# Patient Record
Sex: FEMALE | Race: WHITE | Employment: PART TIME | URBAN - NONMETROPOLITAN AREA
[De-identification: names, ages, dates, MRNs, and addresses within clinical notes are randomized per-mention and may not be internally consistent; named-entity substitution may affect disease eponyms.]

---

## 2017-04-17 ENCOUNTER — HOSPITAL ENCOUNTER (EMERGENCY)
Age: 47
Discharge: HOME | End: 2017-04-17
Attending: EMERGENCY MEDICINE

## 2017-04-17 VITALS
TEMPERATURE: 97.8 F | WEIGHT: 167.55 LBS | RESPIRATION RATE: 14 BRPM | HEART RATE: 64 BPM | SYSTOLIC BLOOD PRESSURE: 125 MMHG | OXYGEN SATURATION: 100 % | BODY MASS INDEX: 25.48 KG/M2 | DIASTOLIC BLOOD PRESSURE: 69 MMHG

## 2017-04-17 DIAGNOSIS — R10.13 DYSPEPSIA: Primary | ICD-10-CM

## 2017-04-17 LAB
ABSOLUTE EOS #: 0 K/UL (ref 0–0.4)
ABSOLUTE LYMPH #: 2 K/UL (ref 1.1–2.7)
ABSOLUTE MONO #: 0.5 K/UL (ref 0–1)
ALBUMIN SERPL-MCNC: 4.4 G/DL (ref 3.5–5.2)
ALBUMIN/GLOBULIN RATIO: ABNORMAL (ref 1–2.5)
ALP BLD-CCNC: 66 U/L (ref 35–104)
ALT SERPL-CCNC: 10 U/L (ref 5–33)
ANION GAP SERPL CALCULATED.3IONS-SCNC: 14 MMOL/L (ref 9–17)
AST SERPL-CCNC: 17 U/L
BASOPHILS # BLD: 1 % (ref 0–2)
BASOPHILS ABSOLUTE: 0.1 K/UL (ref 0–0.2)
BILIRUB SERPL-MCNC: 0.48 MG/DL (ref 0.3–1.2)
BILIRUBIN URINE: NEGATIVE
BUN BLDV-MCNC: 13 MG/DL (ref 6–20)
BUN/CREAT BLD: 18 (ref 9–20)
CALCIUM SERPL-MCNC: 9.3 MG/DL (ref 8.6–10.4)
CHLORIDE BLD-SCNC: 97 MMOL/L (ref 98–107)
CO2: 27 MMOL/L (ref 20–31)
COLOR: YELLOW
COMMENT UA: NORMAL
CREAT SERPL-MCNC: 0.74 MG/DL (ref 0.5–0.9)
DIFFERENTIAL TYPE: YES
EOSINOPHILS RELATIVE PERCENT: 0 % (ref 0–5)
GFR AFRICAN AMERICAN: >60 ML/MIN
GFR NON-AFRICAN AMERICAN: >60 ML/MIN
GFR SERPL CREATININE-BSD FRML MDRD: ABNORMAL ML/MIN/{1.73_M2}
GFR SERPL CREATININE-BSD FRML MDRD: ABNORMAL ML/MIN/{1.73_M2}
GLUCOSE BLD-MCNC: 89 MG/DL (ref 70–99)
GLUCOSE URINE: NEGATIVE
HCT VFR BLD CALC: 38.7 % (ref 36–46)
HEMOGLOBIN: 12.8 G/DL (ref 12–16)
KETONES, URINE: NEGATIVE
LEUKOCYTE ESTERASE, URINE: NEGATIVE
LYMPHOCYTES # BLD: 26 % (ref 15–40)
MCH RBC QN AUTO: 28.3 PG (ref 26–34)
MCHC RBC AUTO-ENTMCNC: 33.1 G/DL (ref 31–37)
MCV RBC AUTO: 85.6 FL (ref 80–100)
MONOCYTES # BLD: 6 % (ref 4–8)
NITRITE, URINE: NEGATIVE
PDW BLD-RTO: 13.4 % (ref 12.1–15.2)
PH UA: 7 (ref 5–8)
PLATELET # BLD: 298 K/UL (ref 140–450)
PLATELET ESTIMATE: NORMAL
PMV BLD AUTO: NORMAL FL (ref 6–12)
POTASSIUM SERPL-SCNC: 3.9 MMOL/L (ref 3.7–5.3)
PROTEIN UA: NEGATIVE
RBC # BLD: 4.52 M/UL (ref 4–5.2)
RBC # BLD: NORMAL 10*6/UL
SEG NEUTROPHILS: 67 % (ref 47–75)
SEGMENTED NEUTROPHILS ABSOLUTE COUNT: 5.4 K/UL (ref 2.5–7)
SODIUM BLD-SCNC: 138 MMOL/L (ref 135–144)
SPECIFIC GRAVITY UA: 1.01 (ref 1–1.03)
TOTAL PROTEIN: 7.1 G/DL (ref 6.4–8.3)
TURBIDITY: CLEAR
URINE HGB: NEGATIVE
UROBILINOGEN, URINE: NORMAL
WBC # BLD: 8 K/UL (ref 3.5–11)
WBC # BLD: NORMAL 10*3/UL

## 2017-04-17 PROCEDURE — 85025 COMPLETE CBC W/AUTO DIFF WBC: CPT

## 2017-04-17 PROCEDURE — 81003 URINALYSIS AUTO W/O SCOPE: CPT

## 2017-04-17 PROCEDURE — 80053 COMPREHEN METABOLIC PANEL: CPT

## 2017-04-17 PROCEDURE — 36415 COLL VENOUS BLD VENIPUNCTURE: CPT

## 2017-04-17 PROCEDURE — 93005 ELECTROCARDIOGRAM TRACING: CPT

## 2017-04-17 PROCEDURE — 99284 EMERGENCY DEPT VISIT MOD MDM: CPT

## 2017-04-17 PROCEDURE — 6370000000 HC RX 637 (ALT 250 FOR IP): Performed by: EMERGENCY MEDICINE

## 2017-04-17 RX ORDER — OMEPRAZOLE 40 MG/1
40 CAPSULE, DELAYED RELEASE ORAL DAILY
Qty: 30 CAPSULE | Refills: 1 | Status: SHIPPED | OUTPATIENT
Start: 2017-04-17 | End: 2017-04-24 | Stop reason: HOSPADM

## 2017-04-17 RX ADMIN — Medication 30 ML: at 09:50

## 2017-04-17 ASSESSMENT — PAIN DESCRIPTION - LOCATION: LOCATION: ABDOMEN

## 2017-04-17 ASSESSMENT — PAIN SCALES - GENERAL
PAINLEVEL_OUTOF10: 2
PAINLEVEL_OUTOF10: 1
PAINLEVEL_OUTOF10: 7

## 2017-04-17 ASSESSMENT — PAIN DESCRIPTION - DESCRIPTORS: DESCRIPTORS: STABBING

## 2017-04-17 ASSESSMENT — PAIN DESCRIPTION - PAIN TYPE: TYPE: ACUTE PAIN

## 2017-04-17 ASSESSMENT — PAIN DESCRIPTION - FREQUENCY: FREQUENCY: INTERMITTENT

## 2017-04-24 ENCOUNTER — HOSPITAL ENCOUNTER (EMERGENCY)
Age: 47
Discharge: HOME | End: 2017-04-24
Attending: EMERGENCY MEDICINE

## 2017-04-24 ENCOUNTER — APPOINTMENT (OUTPATIENT)
Dept: GENERAL RADIOLOGY | Age: 47
End: 2017-04-24

## 2017-04-24 VITALS
HEART RATE: 78 BPM | DIASTOLIC BLOOD PRESSURE: 75 MMHG | BODY MASS INDEX: 25.09 KG/M2 | OXYGEN SATURATION: 99 % | RESPIRATION RATE: 18 BRPM | SYSTOLIC BLOOD PRESSURE: 129 MMHG | WEIGHT: 165 LBS | TEMPERATURE: 98.6 F

## 2017-04-24 DIAGNOSIS — K80.50 BILIARY COLIC: ICD-10-CM

## 2017-04-24 DIAGNOSIS — R10.9 ABDOMINAL PAIN, UNSPECIFIED LOCATION: Primary | ICD-10-CM

## 2017-04-24 LAB
ABSOLUTE EOS #: 0.1 K/UL (ref 0–0.4)
ABSOLUTE LYMPH #: 2.1 K/UL (ref 1.1–2.7)
ABSOLUTE MONO #: 0.7 K/UL (ref 0–1)
ANION GAP SERPL CALCULATED.3IONS-SCNC: 10 MMOL/L (ref 9–17)
BASOPHILS # BLD: 0 %
BASOPHILS ABSOLUTE: 0 K/UL (ref 0–0.2)
BILIRUBIN URINE: NEGATIVE
BUN BLDV-MCNC: 11 MG/DL (ref 6–20)
BUN/CREAT BLD: 15 (ref 9–20)
CALCIUM SERPL-MCNC: 9.3 MG/DL (ref 8.6–10.4)
CHLORIDE BLD-SCNC: 100 MMOL/L (ref 98–107)
CO2: 29 MMOL/L (ref 20–31)
COLOR: YELLOW
COMMENT UA: NORMAL
CREAT SERPL-MCNC: 0.71 MG/DL (ref 0.5–0.9)
DIFFERENTIAL TYPE: YES
EOSINOPHILS RELATIVE PERCENT: 1 %
GFR AFRICAN AMERICAN: >60 ML/MIN
GFR NON-AFRICAN AMERICAN: >60 ML/MIN
GFR SERPL CREATININE-BSD FRML MDRD: NORMAL ML/MIN/{1.73_M2}
GFR SERPL CREATININE-BSD FRML MDRD: NORMAL ML/MIN/{1.73_M2}
GLUCOSE BLD-MCNC: 95 MG/DL (ref 70–99)
GLUCOSE URINE: NEGATIVE
HCG(URINE) PREGNANCY TEST: NEGATIVE
HCT VFR BLD CALC: 37 % (ref 36–46)
HEMOGLOBIN: 12.3 G/DL (ref 12–16)
KETONES, URINE: NEGATIVE
LEUKOCYTE ESTERASE, URINE: NEGATIVE
LIPASE: 40 U/L (ref 13–60)
LYMPHOCYTES # BLD: 30 %
MCH RBC QN AUTO: 28.5 PG (ref 26–34)
MCHC RBC AUTO-ENTMCNC: 33.2 G/DL (ref 31–37)
MCV RBC AUTO: 85.9 FL (ref 80–100)
MONOCYTES # BLD: 10 %
NITRITE, URINE: NEGATIVE
PDW BLD-RTO: 13.3 % (ref 12.1–15.2)
PH UA: 6.5 (ref 5–8)
PLATELET # BLD: 316 K/UL (ref 140–450)
PLATELET ESTIMATE: NORMAL
PMV BLD AUTO: NORMAL FL (ref 6–12)
POTASSIUM SERPL-SCNC: 4.1 MMOL/L (ref 3.7–5.3)
PROTEIN UA: NEGATIVE
RBC # BLD: 4.3 M/UL (ref 4–5.2)
RBC # BLD: NORMAL 10*6/UL
SEG NEUTROPHILS: 59 %
SEGMENTED NEUTROPHILS ABSOLUTE COUNT: 4.2 K/UL (ref 2.5–7)
SODIUM BLD-SCNC: 139 MMOL/L (ref 135–144)
SPECIFIC GRAVITY UA: 1.01 (ref 1–1.03)
TURBIDITY: CLEAR
URINE HGB: NEGATIVE
UROBILINOGEN, URINE: NORMAL
WBC # BLD: 7.1 K/UL (ref 3.5–11)
WBC # BLD: NORMAL 10*3/UL

## 2017-04-24 PROCEDURE — 99284 EMERGENCY DEPT VISIT MOD MDM: CPT

## 2017-04-24 PROCEDURE — 81003 URINALYSIS AUTO W/O SCOPE: CPT

## 2017-04-24 PROCEDURE — 84703 CHORIONIC GONADOTROPIN ASSAY: CPT

## 2017-04-24 PROCEDURE — 74022 RADEX COMPL AQT ABD SERIES: CPT

## 2017-04-24 PROCEDURE — 85025 COMPLETE CBC W/AUTO DIFF WBC: CPT

## 2017-04-24 PROCEDURE — 80048 BASIC METABOLIC PNL TOTAL CA: CPT

## 2017-04-24 PROCEDURE — 36415 COLL VENOUS BLD VENIPUNCTURE: CPT

## 2017-04-24 PROCEDURE — 2580000003 HC RX 258: Performed by: EMERGENCY MEDICINE

## 2017-04-24 PROCEDURE — 83690 ASSAY OF LIPASE: CPT

## 2017-04-24 RX ORDER — PANTOPRAZOLE SODIUM 40 MG/1
40 TABLET, DELAYED RELEASE ORAL DAILY
Qty: 10 TABLET | Refills: 0 | Status: SHIPPED | OUTPATIENT
Start: 2017-04-24 | End: 2017-05-10 | Stop reason: SDUPTHER

## 2017-04-24 RX ADMIN — SODIUM CHLORIDE 1000 ML: 9 INJECTION, SOLUTION INTRAVENOUS at 06:55

## 2017-04-24 ASSESSMENT — ENCOUNTER SYMPTOMS
ALLERGIC/IMMUNOLOGIC NEGATIVE: 1
NAUSEA: 0
SORE THROAT: 0
HEMATOCHEZIA: 0
RECTAL PAIN: 0
RESPIRATORY NEGATIVE: 1
SHORTNESS OF BREATH: 0
BLOOD IN STOOL: 0
CONSTIPATION: 0
ABDOMINAL PAIN: 1
BELCHING: 0
VOMITING: 0
ABDOMINAL DISTENTION: 0
ANAL BLEEDING: 0
FLATUS: 0
COUGH: 0
HEMATEMESIS: 0
EYES NEGATIVE: 1
DIARRHEA: 0

## 2017-04-24 ASSESSMENT — PAIN SCALES - GENERAL: PAINLEVEL_OUTOF10: 10

## 2017-04-25 ENCOUNTER — INITIAL CONSULT (OUTPATIENT)
Dept: SURGERY | Age: 47
End: 2017-04-25

## 2017-04-25 VITALS
RESPIRATION RATE: 16 BRPM | HEART RATE: 81 BPM | HEIGHT: 68 IN | SYSTOLIC BLOOD PRESSURE: 134 MMHG | WEIGHT: 166 LBS | BODY MASS INDEX: 25.16 KG/M2 | TEMPERATURE: 97.5 F | DIASTOLIC BLOOD PRESSURE: 67 MMHG

## 2017-04-25 DIAGNOSIS — K80.20 SYMPTOMATIC CHOLELITHIASIS: Primary | ICD-10-CM

## 2017-04-25 PROCEDURE — 99204 OFFICE O/P NEW MOD 45 MIN: CPT | Performed by: SURGERY

## 2017-04-28 ENCOUNTER — HOSPITAL ENCOUNTER (OUTPATIENT)
Dept: ULTRASOUND IMAGING | Age: 47
Discharge: HOME | End: 2017-04-28

## 2017-04-28 ENCOUNTER — ANESTHESIA EVENT (OUTPATIENT)
Dept: OPERATING ROOM | Age: 47
End: 2017-04-28

## 2017-04-28 DIAGNOSIS — K80.20 SYMPTOMATIC CHOLELITHIASIS: ICD-10-CM

## 2017-04-28 PROCEDURE — 76705 ECHO EXAM OF ABDOMEN: CPT

## 2017-05-01 ENCOUNTER — APPOINTMENT (OUTPATIENT)
Dept: GENERAL RADIOLOGY | Age: 47
End: 2017-05-01
Attending: SURGERY

## 2017-05-01 ENCOUNTER — HOSPITAL ENCOUNTER (OUTPATIENT)
Age: 47
Setting detail: OUTPATIENT SURGERY
Discharge: HOME | End: 2017-05-01
Attending: SURGERY | Admitting: SURGERY

## 2017-05-01 ENCOUNTER — ANESTHESIA (OUTPATIENT)
Dept: OPERATING ROOM | Age: 47
End: 2017-05-01

## 2017-05-01 VITALS
SYSTOLIC BLOOD PRESSURE: 131 MMHG | TEMPERATURE: 98.6 F | RESPIRATION RATE: 13 BRPM | DIASTOLIC BLOOD PRESSURE: 73 MMHG | OXYGEN SATURATION: 100 %

## 2017-05-01 VITALS
WEIGHT: 163 LBS | BODY MASS INDEX: 25.58 KG/M2 | DIASTOLIC BLOOD PRESSURE: 50 MMHG | TEMPERATURE: 97 F | SYSTOLIC BLOOD PRESSURE: 114 MMHG | HEIGHT: 67 IN | OXYGEN SATURATION: 99 % | RESPIRATION RATE: 16 BRPM | HEART RATE: 56 BPM

## 2017-05-01 PROBLEM — K80.20 SYMPTOMATIC CHOLELITHIASIS: Status: ACTIVE | Noted: 2017-05-01

## 2017-05-01 LAB — HCG(URINE) PREGNANCY TEST: NEGATIVE

## 2017-05-01 PROCEDURE — 3600000003 HC SURGERY LEVEL 3 BASE: Performed by: SURGERY

## 2017-05-01 PROCEDURE — 6360000002 HC RX W HCPCS: Performed by: SURGERY

## 2017-05-01 PROCEDURE — 6360000002 HC RX W HCPCS: Performed by: NURSE ANESTHETIST, CERTIFIED REGISTERED

## 2017-05-01 PROCEDURE — 3700000000 HC ANESTHESIA ATTENDED CARE: Performed by: SURGERY

## 2017-05-01 PROCEDURE — 7100000010 HC PHASE II RECOVERY - FIRST 15 MIN: Performed by: SURGERY

## 2017-05-01 PROCEDURE — 3600000013 HC SURGERY LEVEL 3 ADDTL 15MIN: Performed by: SURGERY

## 2017-05-01 PROCEDURE — 88304 TISSUE EXAM BY PATHOLOGIST: CPT

## 2017-05-01 PROCEDURE — 84703 CHORIONIC GONADOTROPIN ASSAY: CPT

## 2017-05-01 PROCEDURE — 7100000000 HC PACU RECOVERY - FIRST 15 MIN: Performed by: SURGERY

## 2017-05-01 PROCEDURE — 7100000011 HC PHASE II RECOVERY - ADDTL 15 MIN: Performed by: SURGERY

## 2017-05-01 PROCEDURE — 7100000001 HC PACU RECOVERY - ADDTL 15 MIN: Performed by: SURGERY

## 2017-05-01 PROCEDURE — 2580000003 HC RX 258: Performed by: SURGERY

## 2017-05-01 PROCEDURE — 2500000003 HC RX 250 WO HCPCS: Performed by: NURSE ANESTHETIST, CERTIFIED REGISTERED

## 2017-05-01 PROCEDURE — 3700000001 HC ADD 15 MINUTES (ANESTHESIA): Performed by: SURGERY

## 2017-05-01 PROCEDURE — 2500000003 HC RX 250 WO HCPCS: Performed by: SURGERY

## 2017-05-01 PROCEDURE — 2500000003 HC RX 250 WO HCPCS

## 2017-05-01 RX ORDER — SUCCINYLCHOLINE CHLORIDE 20 MG/ML
INJECTION INTRAMUSCULAR; INTRAVENOUS PRN
Status: DISCONTINUED | OUTPATIENT
Start: 2017-05-01 | End: 2017-05-01 | Stop reason: SDUPTHER

## 2017-05-01 RX ORDER — FENTANYL CITRATE 50 UG/ML
INJECTION, SOLUTION INTRAMUSCULAR; INTRAVENOUS PRN
Status: DISCONTINUED | OUTPATIENT
Start: 2017-05-01 | End: 2017-05-01 | Stop reason: SDUPTHER

## 2017-05-01 RX ORDER — DEXAMETHASONE SODIUM PHOSPHATE 10 MG/ML
INJECTION INTRAMUSCULAR; INTRAVENOUS PRN
Status: DISCONTINUED | OUTPATIENT
Start: 2017-05-01 | End: 2017-05-01 | Stop reason: SDUPTHER

## 2017-05-01 RX ORDER — KETOROLAC TROMETHAMINE 30 MG/ML
INJECTION, SOLUTION INTRAMUSCULAR; INTRAVENOUS PRN
Status: DISCONTINUED | OUTPATIENT
Start: 2017-05-01 | End: 2017-05-01 | Stop reason: SDUPTHER

## 2017-05-01 RX ORDER — MIDAZOLAM HYDROCHLORIDE 1 MG/ML
INJECTION INTRAMUSCULAR; INTRAVENOUS PRN
Status: DISCONTINUED | OUTPATIENT
Start: 2017-05-01 | End: 2017-05-01 | Stop reason: SDUPTHER

## 2017-05-01 RX ORDER — BUPIVACAINE HYDROCHLORIDE AND EPINEPHRINE 2.5; 5 MG/ML; UG/ML
INJECTION, SOLUTION EPIDURAL; INFILTRATION; INTRACAUDAL; PERINEURAL PRN
Status: DISCONTINUED | OUTPATIENT
Start: 2017-05-01 | End: 2017-05-01 | Stop reason: HOSPADM

## 2017-05-01 RX ORDER — SODIUM CHLORIDE, SODIUM LACTATE, POTASSIUM CHLORIDE, CALCIUM CHLORIDE 600; 310; 30; 20 MG/100ML; MG/100ML; MG/100ML; MG/100ML
INJECTION, SOLUTION INTRAVENOUS CONTINUOUS
Status: DISCONTINUED | OUTPATIENT
Start: 2017-05-01 | End: 2017-05-01 | Stop reason: HOSPADM

## 2017-05-01 RX ORDER — SODIUM CHLORIDE 0.9 % (FLUSH) 0.9 %
10 SYRINGE (ML) INJECTION PRN
Status: DISCONTINUED | OUTPATIENT
Start: 2017-05-01 | End: 2017-05-01 | Stop reason: HOSPADM

## 2017-05-01 RX ORDER — MORPHINE SULFATE 4 MG/ML
1 INJECTION, SOLUTION INTRAMUSCULAR; INTRAVENOUS
Status: DISCONTINUED | OUTPATIENT
Start: 2017-05-01 | End: 2017-05-01 | Stop reason: HOSPADM

## 2017-05-01 RX ORDER — ONDANSETRON 2 MG/ML
INJECTION INTRAMUSCULAR; INTRAVENOUS PRN
Status: DISCONTINUED | OUTPATIENT
Start: 2017-05-01 | End: 2017-05-01 | Stop reason: SDUPTHER

## 2017-05-01 RX ORDER — ROCURONIUM BROMIDE 10 MG/ML
INJECTION, SOLUTION INTRAVENOUS PRN
Status: DISCONTINUED | OUTPATIENT
Start: 2017-05-01 | End: 2017-05-01 | Stop reason: SDUPTHER

## 2017-05-01 RX ORDER — LIDOCAINE HYDROCHLORIDE 10 MG/ML
INJECTION, SOLUTION EPIDURAL; INFILTRATION; INTRACAUDAL; PERINEURAL PRN
Status: DISCONTINUED | OUTPATIENT
Start: 2017-05-01 | End: 2017-05-01 | Stop reason: HOSPADM

## 2017-05-01 RX ORDER — PROPOFOL 10 MG/ML
INJECTION, EMULSION INTRAVENOUS PRN
Status: DISCONTINUED | OUTPATIENT
Start: 2017-05-01 | End: 2017-05-01 | Stop reason: SDUPTHER

## 2017-05-01 RX ORDER — ACETAMINOPHEN 10 MG/ML
INJECTION, SOLUTION INTRAVENOUS PRN
Status: DISCONTINUED | OUTPATIENT
Start: 2017-05-01 | End: 2017-05-01 | Stop reason: SDUPTHER

## 2017-05-01 RX ORDER — ACETAMINOPHEN 325 MG/1
650 TABLET ORAL EVERY 4 HOURS PRN
Status: DISCONTINUED | OUTPATIENT
Start: 2017-05-01 | End: 2017-05-01 | Stop reason: HOSPADM

## 2017-05-01 RX ORDER — SODIUM CHLORIDE 0.9 % (FLUSH) 0.9 %
10 SYRINGE (ML) INJECTION EVERY 12 HOURS SCHEDULED
Status: DISCONTINUED | OUTPATIENT
Start: 2017-05-01 | End: 2017-05-01 | Stop reason: HOSPADM

## 2017-05-01 RX ORDER — HYDROCODONE BITARTRATE AND ACETAMINOPHEN 5; 325 MG/1; MG/1
TABLET ORAL
Qty: 30 TABLET | Refills: 0 | Status: SHIPPED | OUTPATIENT
Start: 2017-05-01 | End: 2017-05-10

## 2017-05-01 RX ORDER — ONDANSETRON 2 MG/ML
4 INJECTION INTRAMUSCULAR; INTRAVENOUS EVERY 6 HOURS PRN
Status: DISCONTINUED | OUTPATIENT
Start: 2017-05-01 | End: 2017-05-01 | Stop reason: HOSPADM

## 2017-05-01 RX ORDER — LIDOCAINE HYDROCHLORIDE 10 MG/ML
INJECTION, SOLUTION EPIDURAL; INFILTRATION; INTRACAUDAL; PERINEURAL PRN
Status: DISCONTINUED | OUTPATIENT
Start: 2017-05-01 | End: 2017-05-01 | Stop reason: SDUPTHER

## 2017-05-01 RX ADMIN — ACETAMINOPHEN 1000 MG: 10 INJECTION, SOLUTION INTRAVENOUS at 11:29

## 2017-05-01 RX ADMIN — SODIUM CHLORIDE, POTASSIUM CHLORIDE, SODIUM LACTATE AND CALCIUM CHLORIDE: 600; 310; 30; 20 INJECTION, SOLUTION INTRAVENOUS at 10:37

## 2017-05-01 RX ADMIN — MIDAZOLAM 2 MG: 1 INJECTION INTRAMUSCULAR; INTRAVENOUS at 11:16

## 2017-05-01 RX ADMIN — ROCURONIUM BROMIDE 30 MG: 10 INJECTION, SOLUTION INTRAVENOUS at 11:29

## 2017-05-01 RX ADMIN — CEFAZOLIN SODIUM 2 G: 2 SOLUTION INTRAVENOUS at 11:15

## 2017-05-01 RX ADMIN — FENTANYL CITRATE 100 MCG: 50 INJECTION, SOLUTION INTRAMUSCULAR; INTRAVENOUS at 11:16

## 2017-05-01 RX ADMIN — SUCCINYLCHOLINE CHLORIDE 100 MG: 20 INJECTION, SOLUTION INTRAMUSCULAR; INTRAVENOUS at 11:21

## 2017-05-01 RX ADMIN — SUGAMMADEX 160 MG: 100 INJECTION, SOLUTION INTRAVENOUS at 12:10

## 2017-05-01 RX ADMIN — LIDOCAINE HYDROCHLORIDE 40 MG: 10 INJECTION, SOLUTION EPIDURAL; INFILTRATION; INTRACAUDAL; PERINEURAL at 11:21

## 2017-05-01 RX ADMIN — DEXAMETHASONE SODIUM PHOSPHATE 10 MG: 10 INJECTION INTRAMUSCULAR; INTRAVENOUS at 11:21

## 2017-05-01 RX ADMIN — HYDROMORPHONE HYDROCHLORIDE 0.5 MG: 1 INJECTION, SOLUTION INTRAMUSCULAR; INTRAVENOUS; SUBCUTANEOUS at 12:53

## 2017-05-01 RX ADMIN — KETOROLAC TROMETHAMINE 30 MG: 30 INJECTION, SOLUTION INTRAMUSCULAR at 11:21

## 2017-05-01 RX ADMIN — HYDROMORPHONE HYDROCHLORIDE 0.5 MG: 1 INJECTION, SOLUTION INTRAMUSCULAR; INTRAVENOUS; SUBCUTANEOUS at 13:42

## 2017-05-01 RX ADMIN — PROPOFOL 200 MG: 10 INJECTION, EMULSION INTRAVENOUS at 11:21

## 2017-05-01 RX ADMIN — ONDANSETRON 4 MG: 2 INJECTION, SOLUTION INTRAMUSCULAR; INTRAVENOUS at 11:21

## 2017-05-01 ASSESSMENT — PAIN SCALES - GENERAL
PAINLEVEL_OUTOF10: 5
PAINLEVEL_OUTOF10: 3
PAINLEVEL_OUTOF10: 0
PAINLEVEL_OUTOF10: 6
PAINLEVEL_OUTOF10: 3
PAINLEVEL_OUTOF10: 6
PAINLEVEL_OUTOF10: 10

## 2017-05-01 ASSESSMENT — PAIN - FUNCTIONAL ASSESSMENT: PAIN_FUNCTIONAL_ASSESSMENT: 0-10

## 2017-05-03 LAB — SURGICAL PATHOLOGY REPORT: NORMAL

## 2017-05-10 ENCOUNTER — OFFICE VISIT (OUTPATIENT)
Dept: SURGERY | Age: 47
End: 2017-05-10

## 2017-05-10 VITALS — BODY MASS INDEX: 25.43 KG/M2 | WEIGHT: 162 LBS | HEIGHT: 67 IN | TEMPERATURE: 98.1 F

## 2017-05-10 DIAGNOSIS — Z90.49 S/P LAPAROSCOPIC CHOLECYSTECTOMY: Primary | ICD-10-CM

## 2017-05-10 LAB
EKG ATRIAL RATE: 90 BPM
EKG P AXIS: 71 DEGREES
EKG P-R INTERVAL: 152 MS
EKG Q-T INTERVAL: 378 MS
EKG QRS DURATION: 88 MS
EKG QTC CALCULATION (BAZETT): 462 MS
EKG R AXIS: 77 DEGREES
EKG T AXIS: 60 DEGREES
EKG VENTRICULAR RATE: 90 BPM

## 2017-05-10 PROCEDURE — 99024 POSTOP FOLLOW-UP VISIT: CPT | Performed by: SURGERY

## 2017-05-10 RX ORDER — PANTOPRAZOLE SODIUM 40 MG/1
40 GRANULE, DELAYED RELEASE ORAL
COMMUNITY
End: 2018-04-04 | Stop reason: ALTCHOICE

## 2017-10-25 ENCOUNTER — OFFICE VISIT (OUTPATIENT)
Dept: FAMILY MEDICINE CLINIC | Age: 47
End: 2017-10-25

## 2017-10-25 VITALS
WEIGHT: 172 LBS | SYSTOLIC BLOOD PRESSURE: 130 MMHG | DIASTOLIC BLOOD PRESSURE: 80 MMHG | HEIGHT: 67 IN | BODY MASS INDEX: 27 KG/M2

## 2017-10-25 DIAGNOSIS — N39.0 ACUTE UTI: Primary | ICD-10-CM

## 2017-10-25 DIAGNOSIS — F33.1 MODERATE EPISODE OF RECURRENT MAJOR DEPRESSIVE DISORDER (HCC): ICD-10-CM

## 2017-10-25 PROCEDURE — 99213 OFFICE O/P EST LOW 20 MIN: CPT | Performed by: FAMILY MEDICINE

## 2017-10-25 RX ORDER — SULFAMETHOXAZOLE AND TRIMETHOPRIM 800; 160 MG/1; MG/1
1 TABLET ORAL 2 TIMES DAILY
Qty: 14 TABLET | Refills: 0 | Status: SHIPPED | OUTPATIENT
Start: 2017-10-25 | End: 2017-11-01

## 2017-10-25 RX ORDER — CITALOPRAM 20 MG/1
20 TABLET ORAL DAILY
Qty: 30 TABLET | Refills: 5 | Status: SHIPPED | OUTPATIENT
Start: 2017-10-25 | End: 2018-04-04 | Stop reason: SDUPTHER

## 2017-10-25 ASSESSMENT — PATIENT HEALTH QUESTIONNAIRE - PHQ9
1. LITTLE INTEREST OR PLEASURE IN DOING THINGS: 0
SUM OF ALL RESPONSES TO PHQ QUESTIONS 1-9: 0
SUM OF ALL RESPONSES TO PHQ9 QUESTIONS 1 & 2: 0
2. FEELING DOWN, DEPRESSED OR HOPELESS: 0

## 2017-10-25 NOTE — PROGRESS NOTES
Prescriptions Disp Refills    citalopram (CELEXA) 20 MG tablet 30 tablet 5     Sig: Take 1 tablet by mouth daily    sulfamethoxazole-trimethoprim (BACTRIM DS) 800-160 MG per tablet 14 tablet 0     Sig: Take 1 tablet by mouth 2 times daily for 7 days       Patient Instructions     SURVEY:    You may be receiving a survey from [employer] regarding your visit today. Please complete the survey to enable us to provide the highest quality of care to you and your family. If you cannot score us as very good on any question, please call the office to discuss how we could have made your experience exceptional.     Thank you. Freddy Simmons received counseling on the following healthy behaviors: medication adherence  Reviewed prior labs and health maintenance. Continue current medications, diet and exercise. Discussed use, benefit, and side effects of prescribed medications. Barriers to medication compliance addressed. Patient given educational materials - see patient instructions. All patient questions answered. Patient voiced understanding.        Electronically signed by Valerie Lyon DO on 10/31/2017 at 8:22 PM

## 2017-10-25 NOTE — PATIENT INSTRUCTIONS
SURVEY:    You may be receiving a survey from [employer] regarding your visit today. Please complete the survey to enable us to provide the highest quality of care to you and your family. If you cannot score us as very good on any question, please call the office to discuss how we could have made your experience exceptional.     Thank you.

## 2017-10-31 ASSESSMENT — ENCOUNTER SYMPTOMS
RESPIRATORY NEGATIVE: 1
GASTROINTESTINAL NEGATIVE: 1

## 2018-02-19 ENCOUNTER — HOSPITAL ENCOUNTER (OUTPATIENT)
Dept: MAMMOGRAPHY | Age: 48
Discharge: HOME | End: 2018-02-21
Payer: COMMERCIAL

## 2018-02-19 DIAGNOSIS — Z12.39 BREAST CANCER SCREENING: ICD-10-CM

## 2018-02-19 PROCEDURE — 77067 SCR MAMMO BI INCL CAD: CPT

## 2018-04-04 ENCOUNTER — HOSPITAL ENCOUNTER (OUTPATIENT)
Age: 48
Discharge: HOME | End: 2018-04-04

## 2018-04-04 ENCOUNTER — OFFICE VISIT (OUTPATIENT)
Dept: FAMILY MEDICINE CLINIC | Age: 48
End: 2018-04-04

## 2018-04-04 VITALS
DIASTOLIC BLOOD PRESSURE: 68 MMHG | HEART RATE: 103 BPM | HEIGHT: 67 IN | OXYGEN SATURATION: 98 % | BODY MASS INDEX: 28.88 KG/M2 | WEIGHT: 184 LBS | SYSTOLIC BLOOD PRESSURE: 120 MMHG

## 2018-04-04 DIAGNOSIS — N95.1 PERIMENOPAUSAL: ICD-10-CM

## 2018-04-04 DIAGNOSIS — R00.2 PALPITATIONS: ICD-10-CM

## 2018-04-04 DIAGNOSIS — R00.2 PALPITATIONS: Primary | ICD-10-CM

## 2018-04-04 DIAGNOSIS — F33.1 MODERATE EPISODE OF RECURRENT MAJOR DEPRESSIVE DISORDER (HCC): ICD-10-CM

## 2018-04-04 LAB — TSH SERPL DL<=0.05 MIU/L-ACNC: 3.11 MIU/L (ref 0.3–5)

## 2018-04-04 PROCEDURE — 99213 OFFICE O/P EST LOW 20 MIN: CPT | Performed by: FAMILY MEDICINE

## 2018-04-04 PROCEDURE — G0444 DEPRESSION SCREEN ANNUAL: HCPCS | Performed by: FAMILY MEDICINE

## 2018-04-04 PROCEDURE — 84443 ASSAY THYROID STIM HORMONE: CPT

## 2018-04-04 PROCEDURE — 36415 COLL VENOUS BLD VENIPUNCTURE: CPT

## 2018-04-04 RX ORDER — CITALOPRAM 40 MG/1
40 TABLET ORAL DAILY
Qty: 30 TABLET | Refills: 3 | Status: SHIPPED | OUTPATIENT
Start: 2018-04-04 | End: 2018-08-11 | Stop reason: SDUPTHER

## 2018-04-04 RX ORDER — PROPRANOLOL HYDROCHLORIDE 80 MG/1
80 CAPSULE, EXTENDED RELEASE ORAL DAILY
Qty: 30 CAPSULE | Refills: 3 | Status: SHIPPED | OUTPATIENT
Start: 2018-04-04 | End: 2018-08-15

## 2018-04-04 ASSESSMENT — ENCOUNTER SYMPTOMS: RESPIRATORY NEGATIVE: 1

## 2018-04-04 ASSESSMENT — PATIENT HEALTH QUESTIONNAIRE - PHQ9
8. MOVING OR SPEAKING SO SLOWLY THAT OTHER PEOPLE COULD HAVE NOTICED. OR THE OPPOSITE, BEING SO FIGETY OR RESTLESS THAT YOU HAVE BEEN MOVING AROUND A LOT MORE THAN USUAL: 2
9. THOUGHTS THAT YOU WOULD BE BETTER OFF DEAD, OR OF HURTING YOURSELF: 0
SUM OF ALL RESPONSES TO PHQ9 QUESTIONS 1 & 2: 4
10. IF YOU CHECKED OFF ANY PROBLEMS, HOW DIFFICULT HAVE THESE PROBLEMS MADE IT FOR YOU TO DO YOUR WORK, TAKE CARE OF THINGS AT HOME, OR GET ALONG WITH OTHER PEOPLE: 1
6. FEELING BAD ABOUT YOURSELF - OR THAT YOU ARE A FAILURE OR HAVE LET YOURSELF OR YOUR FAMILY DOWN: 3
2. FEELING DOWN, DEPRESSED OR HOPELESS: 2
4. FEELING TIRED OR HAVING LITTLE ENERGY: 0
5. POOR APPETITE OR OVEREATING: 0
SUM OF ALL RESPONSES TO PHQ QUESTIONS 1-9: 12
7. TROUBLE CONCENTRATING ON THINGS, SUCH AS READING THE NEWSPAPER OR WATCHING TELEVISION: 3
1. LITTLE INTEREST OR PLEASURE IN DOING THINGS: 2
3. TROUBLE FALLING OR STAYING ASLEEP: 0

## 2018-08-13 RX ORDER — CITALOPRAM 40 MG/1
TABLET ORAL
Qty: 30 TABLET | Refills: 3 | Status: SHIPPED | OUTPATIENT
Start: 2018-08-13 | End: 2018-09-24 | Stop reason: SDUPTHER

## 2018-08-15 ENCOUNTER — OFFICE VISIT (OUTPATIENT)
Dept: FAMILY MEDICINE CLINIC | Age: 48
End: 2018-08-15
Payer: MEDICAID

## 2018-08-15 VITALS
BODY MASS INDEX: 28.72 KG/M2 | HEIGHT: 67 IN | DIASTOLIC BLOOD PRESSURE: 80 MMHG | WEIGHT: 183 LBS | SYSTOLIC BLOOD PRESSURE: 138 MMHG

## 2018-08-15 DIAGNOSIS — H60.332 ACUTE SWIMMER'S EAR OF LEFT SIDE: ICD-10-CM

## 2018-08-15 DIAGNOSIS — M89.8X7 PAIN IN METATARSUS OF RIGHT FOOT: Primary | ICD-10-CM

## 2018-08-15 DIAGNOSIS — J32.0 LEFT MAXILLARY SINUSITIS: ICD-10-CM

## 2018-08-15 DIAGNOSIS — M21.6X2 PRONATION OF LEFT FOOT: ICD-10-CM

## 2018-08-15 DIAGNOSIS — F32.4 MAJOR DEPRESSIVE DISORDER WITH SINGLE EPISODE, IN PARTIAL REMISSION (HCC): ICD-10-CM

## 2018-08-15 PROCEDURE — G8427 DOCREV CUR MEDS BY ELIG CLIN: HCPCS | Performed by: FAMILY MEDICINE

## 2018-08-15 PROCEDURE — 99214 OFFICE O/P EST MOD 30 MIN: CPT | Performed by: FAMILY MEDICINE

## 2018-08-15 PROCEDURE — 1036F TOBACCO NON-USER: CPT | Performed by: FAMILY MEDICINE

## 2018-08-15 PROCEDURE — G8419 CALC BMI OUT NRM PARAM NOF/U: HCPCS | Performed by: FAMILY MEDICINE

## 2018-08-15 PROCEDURE — 4130F TOPICAL PREP RX AOE: CPT | Performed by: FAMILY MEDICINE

## 2018-08-15 RX ORDER — LEVOFLOXACIN 500 MG/1
500 TABLET, FILM COATED ORAL DAILY
Qty: 7 TABLET | Refills: 0 | Status: SHIPPED | OUTPATIENT
Start: 2018-08-15 | End: 2018-08-22

## 2018-08-15 RX ORDER — PROPRANOLOL HYDROCHLORIDE 80 MG/1
80 CAPSULE, EXTENDED RELEASE ORAL DAILY
Qty: 30 CAPSULE | Refills: 3 | Status: CANCELLED | OUTPATIENT
Start: 2018-08-15

## 2018-08-15 RX ORDER — CIPROFLOXACIN AND DEXAMETHASONE 3; 1 MG/ML; MG/ML
4 SUSPENSION/ DROPS AURICULAR (OTIC) 2 TIMES DAILY
Qty: 1 BOTTLE | Refills: 0 | Status: SHIPPED | OUTPATIENT
Start: 2018-08-15 | End: 2018-08-15

## 2018-08-15 ASSESSMENT — PATIENT HEALTH QUESTIONNAIRE - PHQ9
SUM OF ALL RESPONSES TO PHQ QUESTIONS 1-9: 0
SUM OF ALL RESPONSES TO PHQ9 QUESTIONS 1 & 2: 0
1. LITTLE INTEREST OR PLEASURE IN DOING THINGS: 0
SUM OF ALL RESPONSES TO PHQ QUESTIONS 1-9: 0
2. FEELING DOWN, DEPRESSED OR HOPELESS: 0

## 2018-08-15 ASSESSMENT — ENCOUNTER SYMPTOMS: RESPIRATORY NEGATIVE: 1

## 2018-08-15 NOTE — PROGRESS NOTES
occasional.       Physical Exam:     Vitals:  /80   Ht 5' 7\" (1.702 m)   Wt 183 lb (83 kg)   BMI 28.66 kg/m²   Physical Exam   Constitutional: She is oriented to person, place, and time. She appears well-developed and well-nourished. No distress. HENT:   Right Ear: Tympanic membrane and ear canal normal.   Left Ear: Tympanic membrane and ear canal normal. There is drainage (white fluffy dots on TM and anterior canal). Nose: Left sinus exhibits maxillary sinus tenderness. Mouth/Throat: Oropharynx is clear and moist and mucous membranes are normal.   Eyes: Pupils are equal, round, and reactive to light. Conjunctivae are normal. Right eye exhibits no discharge. Left eye exhibits no discharge. Neck: Neck supple. Pulmonary/Chest: Effort normal.   Musculoskeletal:   R foot dorsal aspect of 1st metatarsal and MTP very TTP. Pain with traction of great toe but not with compression applied to end of toe. No deformity or swelling. Flattened arch of L foot. Non-tender. Lymphadenopathy:     She has no cervical adenopathy. Neurological: She is alert and oriented to person, place, and time. Skin: Skin is warm and dry. Psychiatric: She has a normal mood and affect. Judgment normal.   Nursing note and vitals reviewed.       Data:     Lab Results   Component Value Date     04/24/2017    K 4.1 04/24/2017     04/24/2017    CO2 29 04/24/2017    BUN 11 04/24/2017    CREATININE 0.71 04/24/2017    GLUCOSE 95 04/24/2017    PROT 7.1 04/17/2017    LABALBU 4.4 04/17/2017    BILITOT 0.48 04/17/2017    ALKPHOS 66 04/17/2017    AST 17 04/17/2017    ALT 10 04/17/2017     Lab Results   Component Value Date    WBC 7.1 04/24/2017    RBC 4.30 04/24/2017    HGB 12.3 04/24/2017    HCT 37.0 04/24/2017    MCV 85.9 04/24/2017    MCH 28.5 04/24/2017    MCHC 33.2 04/24/2017    RDW 13.3 04/24/2017     04/24/2017    MPV NOT REPORTED 04/24/2017     Lab Results   Component Value Date    TSH 3.11 04/04/2018     No

## 2018-09-24 RX ORDER — CITALOPRAM 40 MG/1
40 TABLET ORAL DAILY
Qty: 90 TABLET | Refills: 1 | Status: SHIPPED | OUTPATIENT
Start: 2018-09-24 | End: 2019-05-31 | Stop reason: SDUPTHER

## 2018-09-24 NOTE — TELEPHONE ENCOUNTER
Last visit 8/15/18      Requesting med refill - med pending. Health Maintenance   Topic Date Due    HIV screen  08/28/1987    DTaP/Tdap/Td vaccine (1 - Tdap) 08/28/1991    Cervical cancer screen  08/28/1993    Lipid screen  08/28/2012    Flu vaccine (1) 09/01/2018             (applicable per patient's age: Cancer Screenings, Depression Screening, Fall Risk Screening, Immunizations)    AST (U/L)   Date Value   04/17/2017 17     ALT (U/L)   Date Value   04/17/2017 10     BUN (mg/dL)   Date Value   04/24/2017 11      (goal A1C is < 7)   (goal LDL is <100) need 30-50% reduction from baseline     BP Readings from Last 3 Encounters:   08/15/18 138/80   04/04/18 120/68   10/25/17 130/80    (goal /80)      All Future Testing planned in [facility]:      Next Visit Date:  No future appointments.          Patient Active Problem List:     Anxiety     Insomnia     HTN (hypertension)     Symptomatic cholelithiasis

## 2018-10-23 ENCOUNTER — OFFICE VISIT (OUTPATIENT)
Dept: FAMILY MEDICINE CLINIC | Age: 48
End: 2018-10-23
Payer: MEDICAID

## 2018-10-23 VITALS
BODY MASS INDEX: 28.41 KG/M2 | HEART RATE: 74 BPM | SYSTOLIC BLOOD PRESSURE: 120 MMHG | DIASTOLIC BLOOD PRESSURE: 80 MMHG | WEIGHT: 181 LBS | HEIGHT: 67 IN | RESPIRATION RATE: 18 BRPM

## 2018-10-23 DIAGNOSIS — B96.89 ACUTE BACTERIAL SINUSITIS: Primary | ICD-10-CM

## 2018-10-23 DIAGNOSIS — L98.9 SKIN LESION: ICD-10-CM

## 2018-10-23 DIAGNOSIS — J01.90 ACUTE BACTERIAL SINUSITIS: Primary | ICD-10-CM

## 2018-10-23 DIAGNOSIS — K21.9 GASTROESOPHAGEAL REFLUX DISEASE WITHOUT ESOPHAGITIS: ICD-10-CM

## 2018-10-23 PROCEDURE — 99214 OFFICE O/P EST MOD 30 MIN: CPT | Performed by: INTERNAL MEDICINE

## 2018-10-23 PROCEDURE — G8484 FLU IMMUNIZE NO ADMIN: HCPCS | Performed by: INTERNAL MEDICINE

## 2018-10-23 PROCEDURE — 1036F TOBACCO NON-USER: CPT | Performed by: INTERNAL MEDICINE

## 2018-10-23 PROCEDURE — G8419 CALC BMI OUT NRM PARAM NOF/U: HCPCS | Performed by: INTERNAL MEDICINE

## 2018-10-23 PROCEDURE — G8427 DOCREV CUR MEDS BY ELIG CLIN: HCPCS | Performed by: INTERNAL MEDICINE

## 2018-10-23 RX ORDER — OMEPRAZOLE 20 MG/1
20 CAPSULE, DELAYED RELEASE ORAL DAILY
Qty: 30 CAPSULE | Refills: 3 | Status: SHIPPED | OUTPATIENT
Start: 2018-10-23 | End: 2019-02-26 | Stop reason: SDUPTHER

## 2018-10-23 RX ORDER — FLUTICASONE PROPIONATE 50 MCG
1 SPRAY, SUSPENSION (ML) NASAL DAILY
Qty: 1 BOTTLE | Refills: 3 | Status: SHIPPED | OUTPATIENT
Start: 2018-10-23 | End: 2020-03-30

## 2018-10-23 RX ORDER — AMOXICILLIN AND CLAVULANATE POTASSIUM 875; 125 MG/1; MG/1
1 TABLET, FILM COATED ORAL 2 TIMES DAILY
Qty: 14 TABLET | Refills: 0 | Status: SHIPPED | OUTPATIENT
Start: 2018-10-23 | End: 2018-10-30

## 2018-10-23 ASSESSMENT — ENCOUNTER SYMPTOMS
WHEEZING: 0
HEARTBURN: 1
VOICE CHANGE: 0
CHEST TIGHTNESS: 0
SHORTNESS OF BREATH: 0
CHOKING: 0
BELCHING: 1
HOARSE VOICE: 0
SINUS PAIN: 0
ABDOMINAL PAIN: 0
TROUBLE SWALLOWING: 0
COUGH: 0
NAUSEA: 0
SORE THROAT: 0
SINUS PRESSURE: 1

## 2018-11-07 ENCOUNTER — OFFICE VISIT (OUTPATIENT)
Dept: FAMILY MEDICINE CLINIC | Age: 48
End: 2018-11-07
Payer: MEDICAID

## 2018-11-07 ENCOUNTER — HOSPITAL ENCOUNTER (OUTPATIENT)
Age: 48
Discharge: HOME | End: 2018-11-07
Payer: MEDICAID

## 2018-11-07 VITALS
SYSTOLIC BLOOD PRESSURE: 110 MMHG | DIASTOLIC BLOOD PRESSURE: 72 MMHG | BODY MASS INDEX: 29.19 KG/M2 | WEIGHT: 186 LBS | HEIGHT: 67 IN

## 2018-11-07 DIAGNOSIS — M25.50 POLYARTHRALGIA: Primary | ICD-10-CM

## 2018-11-07 DIAGNOSIS — R25.2 HAND CRAMP: ICD-10-CM

## 2018-11-07 DIAGNOSIS — M25.50 POLYARTHRALGIA: ICD-10-CM

## 2018-11-07 DIAGNOSIS — Z13.6 SCREENING FOR CARDIOVASCULAR CONDITION: ICD-10-CM

## 2018-11-07 DIAGNOSIS — M79.671 RIGHT FOOT PAIN: ICD-10-CM

## 2018-11-07 DIAGNOSIS — R53.82 CHRONIC FATIGUE: ICD-10-CM

## 2018-11-07 LAB
ABSOLUTE EOS #: 0.1 K/UL (ref 0–0.4)
ABSOLUTE IMMATURE GRANULOCYTE: ABNORMAL K/UL (ref 0–0.3)
ABSOLUTE LYMPH #: 2.4 K/UL (ref 1–4.8)
ABSOLUTE MONO #: 0.7 K/UL (ref 0–1)
ALBUMIN SERPL-MCNC: 4 G/DL (ref 3.5–5.2)
ALBUMIN/GLOBULIN RATIO: ABNORMAL (ref 1–2.5)
ALP BLD-CCNC: 91 U/L (ref 35–104)
ALT SERPL-CCNC: 16 U/L (ref 5–33)
ANION GAP SERPL CALCULATED.3IONS-SCNC: 11 MMOL/L (ref 9–17)
AST SERPL-CCNC: 19 U/L
BASOPHILS # BLD: 1 % (ref 0–2)
BASOPHILS ABSOLUTE: 0 K/UL (ref 0–0.2)
BILIRUB SERPL-MCNC: 0.25 MG/DL (ref 0.3–1.2)
BUN BLDV-MCNC: 12 MG/DL (ref 6–20)
BUN/CREAT BLD: 15 (ref 9–20)
C-REACTIVE PROTEIN: 6.8 MG/L (ref 0–5)
CALCIUM SERPL-MCNC: 10.7 MG/DL (ref 8.6–10.4)
CHLORIDE BLD-SCNC: 103 MMOL/L (ref 98–107)
CHOLESTEROL/HDL RATIO: 2.8
CHOLESTEROL: 216 MG/DL
CO2: 28 MMOL/L (ref 20–31)
CREAT SERPL-MCNC: 0.81 MG/DL (ref 0.5–0.9)
DIFFERENTIAL TYPE: YES
EOSINOPHILS RELATIVE PERCENT: 1 % (ref 0–5)
GFR AFRICAN AMERICAN: >60 ML/MIN
GFR NON-AFRICAN AMERICAN: >60 ML/MIN
GFR SERPL CREATININE-BSD FRML MDRD: ABNORMAL ML/MIN/{1.73_M2}
GFR SERPL CREATININE-BSD FRML MDRD: ABNORMAL ML/MIN/{1.73_M2}
GLUCOSE BLD-MCNC: 94 MG/DL (ref 70–99)
HCT VFR BLD CALC: 36.7 % (ref 36–46)
HDLC SERPL-MCNC: 76 MG/DL
HEMOGLOBIN: 12.2 G/DL (ref 12–16)
IMMATURE GRANULOCYTES: ABNORMAL %
LDL CHOLESTEROL: 116 MG/DL (ref 0–130)
LYMPHOCYTES # BLD: 33 % (ref 15–40)
MCH RBC QN AUTO: 29.1 PG (ref 26–34)
MCHC RBC AUTO-ENTMCNC: 33.2 G/DL (ref 31–37)
MCV RBC AUTO: 87.7 FL (ref 80–100)
MONOCYTES # BLD: 10 % (ref 4–8)
NRBC AUTOMATED: ABNORMAL PER 100 WBC
PDW BLD-RTO: 14.4 % (ref 12.1–15.2)
PLATELET # BLD: 346 K/UL (ref 140–450)
PLATELET ESTIMATE: ABNORMAL
PMV BLD AUTO: ABNORMAL FL (ref 6–12)
POTASSIUM SERPL-SCNC: 4.1 MMOL/L (ref 3.7–5.3)
RBC # BLD: 4.19 M/UL (ref 4–5.2)
RBC # BLD: ABNORMAL 10*6/UL
RHEUMATOID FACTOR: <10 IU/ML
SEDIMENTATION RATE, ERYTHROCYTE: 21 MM (ref 0–30)
SEG NEUTROPHILS: 55 % (ref 47–75)
SEGMENTED NEUTROPHILS ABSOLUTE COUNT: 4.1 K/UL (ref 2.5–7)
SODIUM BLD-SCNC: 142 MMOL/L (ref 135–144)
TOTAL PROTEIN: 7.1 G/DL (ref 6.4–8.3)
TRIGL SERPL-MCNC: 119 MG/DL
TSH SERPL DL<=0.05 MIU/L-ACNC: 4.75 MIU/L (ref 0.3–5)
VLDLC SERPL CALC-MCNC: ABNORMAL MG/DL (ref 1–30)
WBC # BLD: 7.4 K/UL (ref 3.5–11)
WBC # BLD: ABNORMAL 10*3/UL

## 2018-11-07 PROCEDURE — 86200 CCP ANTIBODY: CPT

## 2018-11-07 PROCEDURE — 1036F TOBACCO NON-USER: CPT | Performed by: FAMILY MEDICINE

## 2018-11-07 PROCEDURE — 86038 ANTINUCLEAR ANTIBODIES: CPT

## 2018-11-07 PROCEDURE — G8484 FLU IMMUNIZE NO ADMIN: HCPCS | Performed by: FAMILY MEDICINE

## 2018-11-07 PROCEDURE — 85651 RBC SED RATE NONAUTOMATED: CPT

## 2018-11-07 PROCEDURE — 99214 OFFICE O/P EST MOD 30 MIN: CPT | Performed by: FAMILY MEDICINE

## 2018-11-07 PROCEDURE — G8419 CALC BMI OUT NRM PARAM NOF/U: HCPCS | Performed by: FAMILY MEDICINE

## 2018-11-07 PROCEDURE — 80053 COMPREHEN METABOLIC PANEL: CPT

## 2018-11-07 PROCEDURE — 86140 C-REACTIVE PROTEIN: CPT

## 2018-11-07 PROCEDURE — 85025 COMPLETE CBC W/AUTO DIFF WBC: CPT

## 2018-11-07 PROCEDURE — 86431 RHEUMATOID FACTOR QUANT: CPT

## 2018-11-07 PROCEDURE — 36415 COLL VENOUS BLD VENIPUNCTURE: CPT

## 2018-11-07 PROCEDURE — 84443 ASSAY THYROID STIM HORMONE: CPT

## 2018-11-07 PROCEDURE — 80061 LIPID PANEL: CPT

## 2018-11-07 PROCEDURE — G8427 DOCREV CUR MEDS BY ELIG CLIN: HCPCS | Performed by: FAMILY MEDICINE

## 2018-11-07 RX ORDER — PREDNISONE 20 MG/1
40 TABLET ORAL DAILY
Qty: 10 TABLET | Refills: 0 | Status: SHIPPED | OUTPATIENT
Start: 2018-11-07 | End: 2018-11-12

## 2018-11-07 ASSESSMENT — PATIENT HEALTH QUESTIONNAIRE - PHQ9
2. FEELING DOWN, DEPRESSED OR HOPELESS: 0
SUM OF ALL RESPONSES TO PHQ9 QUESTIONS 1 & 2: 0
SUM OF ALL RESPONSES TO PHQ QUESTIONS 1-9: 0
SUM OF ALL RESPONSES TO PHQ QUESTIONS 1-9: 0
1. LITTLE INTEREST OR PLEASURE IN DOING THINGS: 0

## 2018-11-07 NOTE — PATIENT INSTRUCTIONS
SURVEY:    You may be receiving a survey from [employer] regarding your visit today. Please complete the survey to enable us to provide the highest quality of care to you and your family. If you cannot score us as very good on any question, please call the office to discuss how we could have made your experience exceptional.     Thank you.

## 2018-11-08 LAB
ANTI-NUCLEAR ANTIBODY (ANA): NEGATIVE
CCP IGG ANTIBODIES: <1.5 U/ML

## 2018-11-09 ENCOUNTER — TELEPHONE (OUTPATIENT)
Dept: FAMILY MEDICINE CLINIC | Age: 48
End: 2018-11-09

## 2018-11-09 RX ORDER — CELECOXIB 100 MG/1
100 CAPSULE ORAL 2 TIMES DAILY
Qty: 60 CAPSULE | Refills: 2 | Status: SHIPPED | OUTPATIENT
Start: 2018-11-09 | End: 2019-05-31 | Stop reason: SDUPTHER

## 2018-11-09 NOTE — TELEPHONE ENCOUNTER
----- Message from Lee Ann Matisa DO sent at 11/9/2018 10:43 AM EST -----  Does not appear to have an autoimmune condition like rheumatoid arthritis. Issues likely d/t osteoarthritis. Finish prednisone course and then may start celebrex 100 mg bid prn pain. This is an effective anti-inflammatory med that is also easier on the stomach than ibuprofen and similar. rx 100 mg #60 2 rf please.

## 2018-11-29 ENCOUNTER — HOSPITAL ENCOUNTER (EMERGENCY)
Age: 48
Discharge: HOME | End: 2018-11-29
Attending: EMERGENCY MEDICINE
Payer: MEDICAID

## 2018-11-29 VITALS
DIASTOLIC BLOOD PRESSURE: 96 MMHG | SYSTOLIC BLOOD PRESSURE: 126 MMHG | HEIGHT: 67 IN | OXYGEN SATURATION: 100 % | RESPIRATION RATE: 18 BRPM | HEART RATE: 87 BPM | BODY MASS INDEX: 30.45 KG/M2 | WEIGHT: 194 LBS | TEMPERATURE: 98.2 F

## 2018-11-29 DIAGNOSIS — S61.210A LACERATION OF RIGHT INDEX FINGER WITHOUT FOREIGN BODY WITHOUT DAMAGE TO NAIL, INITIAL ENCOUNTER: Primary | ICD-10-CM

## 2018-11-29 PROCEDURE — 12001 RPR S/N/AX/GEN/TRNK 2.5CM/<: CPT

## 2018-11-29 PROCEDURE — 6360000002 HC RX W HCPCS: Performed by: EMERGENCY MEDICINE

## 2018-11-29 PROCEDURE — 90715 TDAP VACCINE 7 YRS/> IM: CPT | Performed by: EMERGENCY MEDICINE

## 2018-11-29 PROCEDURE — 90471 IMMUNIZATION ADMIN: CPT | Performed by: EMERGENCY MEDICINE

## 2018-11-29 PROCEDURE — 2500000003 HC RX 250 WO HCPCS: Performed by: EMERGENCY MEDICINE

## 2018-11-29 PROCEDURE — 6370000000 HC RX 637 (ALT 250 FOR IP): Performed by: EMERGENCY MEDICINE

## 2018-11-29 PROCEDURE — 99282 EMERGENCY DEPT VISIT SF MDM: CPT

## 2018-11-29 RX ORDER — LIDOCAINE HYDROCHLORIDE 10 MG/ML
5 INJECTION, SOLUTION INFILTRATION; PERINEURAL ONCE
Status: COMPLETED | OUTPATIENT
Start: 2018-11-29 | End: 2018-11-29

## 2018-11-29 RX ORDER — BACITRACIN, NEOMYCIN, POLYMYXIN B 400; 3.5; 5 [USP'U]/G; MG/G; [USP'U]/G
OINTMENT TOPICAL ONCE
Status: COMPLETED | OUTPATIENT
Start: 2018-11-29 | End: 2018-11-29

## 2018-11-29 RX ADMIN — LIDOCAINE HYDROCHLORIDE 5 ML: 10 INJECTION, SOLUTION INFILTRATION; PERINEURAL at 18:55

## 2018-11-29 RX ADMIN — TETANUS TOXOID, REDUCED DIPHTHERIA TOXOID AND ACELLULAR PERTUSSIS VACCINE, ADSORBED 0.5 ML: 5; 2.5; 8; 8; 2.5 SUSPENSION INTRAMUSCULAR at 18:56

## 2018-11-29 RX ADMIN — BACITRACIN, NEOMYCIN SULFATE, POLYMYXIN-B SULFATE: 400; 3.5; 5 OINTMENT TOPICAL at 18:56

## 2018-11-29 ASSESSMENT — PAIN SCALES - GENERAL: PAINLEVEL_OUTOF10: 0

## 2018-11-29 NOTE — ED PROVIDER NOTES
eMERGENCY dEPARTMENT eNCOUnter        [address] [facility]    Chief Complaint   Patient presents with    Laceration     R index finger laceration from can. Bleeding controlled. Scant amount of blood. [facility]    Alberto Mahmood is a 57 y.o. female who presents to ED from home. By car. With complaint of R index finger laceration. Onset 1 h prior to  arrival.  Patient will stop the bleeding. Location of symptoms R index finger. Patient is not on aspirin. Patient is not on anticoagulation. REVIEW OF SYSTEMS    All systems reviewed and positives are in the HPI      PAST MEDICAL HISTORY    Past Medical History:   Diagnosis Date    Anxiety     Depression     Ulcer     stomach       SURGICAL HISTORY    Past Surgical History:   Procedure Laterality Date     SECTION      CHOLECYSTECTOMY, LAPAROSCOPIC N/A 2017    CHOLECYSTECTOMY LAPAROSCOPIC; photos performed by Cas Keane MD at [facility]    Current Outpatient Rx   Medication Sig Dispense Refill    celecoxib (CELEBREX) 100 MG capsule Take 1 capsule by mouth 2 times daily 60 capsule 2    omeprazole (PRILOSEC) 20 MG delayed release capsule Take 1 capsule by mouth daily 30 capsule 3    fluticasone (FLONASE) 50 MCG/ACT nasal spray 1 spray by Each Nare route daily 1 Bottle 3    citalopram (CELEXA) 40 MG tablet Take 1 tablet by mouth daily 90 tablet 1    acetaminophen (TYLENOL) 325 MG tablet Take 650 mg by mouth every 6 hours as needed.            ALLERGIES    Allergies   Allergen Reactions    Vicodin [Hydrocodone-Acetaminophen] Nausea And Vomiting       FAMILY HISTORY    Family History   Problem Relation Age of Onset    Diabetes Mother     Cancer Mother         Liver Cancer    Heart Disease Father        SOCIAL HISTORY    Social History     Social History    Marital status: Legally      Spouse name: N/A    Number of children: N/A    Years of education:

## 2018-11-30 ENCOUNTER — TELEPHONE (OUTPATIENT)
Dept: FAMILY MEDICINE CLINIC | Age: 48
End: 2018-11-30

## 2018-12-07 ENCOUNTER — OFFICE VISIT (OUTPATIENT)
Dept: FAMILY MEDICINE CLINIC | Age: 48
End: 2018-12-07
Payer: COMMERCIAL

## 2018-12-07 VITALS
HEIGHT: 67 IN | DIASTOLIC BLOOD PRESSURE: 70 MMHG | BODY MASS INDEX: 29.66 KG/M2 | WEIGHT: 189 LBS | SYSTOLIC BLOOD PRESSURE: 120 MMHG

## 2018-12-07 DIAGNOSIS — S61.210D LACERATION OF RIGHT INDEX FINGER WITHOUT FOREIGN BODY WITHOUT DAMAGE TO NAIL, SUBSEQUENT ENCOUNTER: Primary | ICD-10-CM

## 2018-12-07 DIAGNOSIS — Z48.02 VISIT FOR SUTURE REMOVAL: ICD-10-CM

## 2018-12-07 PROCEDURE — 99212 OFFICE O/P EST SF 10 MIN: CPT | Performed by: FAMILY MEDICINE

## 2019-01-01 ENCOUNTER — HOSPITAL ENCOUNTER (EMERGENCY)
Age: 49
Discharge: HOME | End: 2019-01-01
Attending: FAMILY MEDICINE
Payer: COMMERCIAL

## 2019-01-01 ENCOUNTER — APPOINTMENT (OUTPATIENT)
Dept: GENERAL RADIOLOGY | Age: 49
End: 2019-01-01

## 2019-01-01 VITALS
SYSTOLIC BLOOD PRESSURE: 133 MMHG | TEMPERATURE: 99.6 F | OXYGEN SATURATION: 99 % | HEART RATE: 93 BPM | BODY MASS INDEX: 28.77 KG/M2 | HEIGHT: 68 IN | RESPIRATION RATE: 13 BRPM | WEIGHT: 189.82 LBS | DIASTOLIC BLOOD PRESSURE: 78 MMHG

## 2019-01-01 DIAGNOSIS — J10.1 INFLUENZA A: Primary | ICD-10-CM

## 2019-01-01 LAB
DIRECT EXAM: ABNORMAL
DIRECT EXAM: ABNORMAL
Lab: ABNORMAL
SPECIMEN DESCRIPTION: ABNORMAL
STATUS: ABNORMAL

## 2019-01-01 PROCEDURE — 87804 INFLUENZA ASSAY W/OPTIC: CPT

## 2019-01-01 PROCEDURE — 99283 EMERGENCY DEPT VISIT LOW MDM: CPT

## 2019-01-01 PROCEDURE — 71046 X-RAY EXAM CHEST 2 VIEWS: CPT

## 2019-01-01 RX ORDER — OSELTAMIVIR PHOSPHATE 75 MG/1
75 CAPSULE ORAL 2 TIMES DAILY
Qty: 10 CAPSULE | Refills: 0 | Status: SHIPPED | OUTPATIENT
Start: 2019-01-01 | End: 2019-01-06

## 2019-01-01 RX ORDER — BENZONATATE 100 MG/1
100 CAPSULE ORAL EVERY 4 HOURS PRN
Qty: 30 CAPSULE | Refills: 0 | Status: SHIPPED | OUTPATIENT
Start: 2019-01-01 | End: 2019-01-08

## 2019-01-01 RX ORDER — DEXTROMETHORPHAN HYDROBROMIDE AND PROMETHAZINE HYDROCHLORIDE 15; 6.25 MG/5ML; MG/5ML
5 SYRUP ORAL 4 TIMES DAILY PRN
Qty: 140 ML | Refills: 0 | Status: SHIPPED | OUTPATIENT
Start: 2019-01-01 | End: 2019-01-08

## 2019-01-01 ASSESSMENT — PAIN SCALES - GENERAL: PAINLEVEL_OUTOF10: 10

## 2019-01-01 ASSESSMENT — PAIN DESCRIPTION - FREQUENCY: FREQUENCY: CONTINUOUS

## 2019-01-01 ASSESSMENT — PAIN DESCRIPTION - LOCATION: LOCATION: CHEST

## 2019-01-01 ASSESSMENT — PAIN DESCRIPTION - PAIN TYPE: TYPE: ACUTE PAIN

## 2019-01-15 ENCOUNTER — HOSPITAL ENCOUNTER (OUTPATIENT)
Age: 49
Discharge: HOME | End: 2019-01-15
Payer: COMMERCIAL

## 2019-01-15 ENCOUNTER — OFFICE VISIT (OUTPATIENT)
Dept: FAMILY MEDICINE CLINIC | Age: 49
End: 2019-01-15
Payer: COMMERCIAL

## 2019-01-15 VITALS
HEIGHT: 68 IN | DIASTOLIC BLOOD PRESSURE: 80 MMHG | BODY MASS INDEX: 28.79 KG/M2 | SYSTOLIC BLOOD PRESSURE: 128 MMHG | WEIGHT: 190 LBS

## 2019-01-15 DIAGNOSIS — M79.674 GREAT TOE PAIN, RIGHT: ICD-10-CM

## 2019-01-15 DIAGNOSIS — J10.1 INFLUENZA A: ICD-10-CM

## 2019-01-15 DIAGNOSIS — M79.674 GREAT TOE PAIN, RIGHT: Primary | ICD-10-CM

## 2019-01-15 DIAGNOSIS — E83.52 SERUM CALCIUM ELEVATED: ICD-10-CM

## 2019-01-15 LAB
ANION GAP SERPL CALCULATED.3IONS-SCNC: 12 MMOL/L (ref 9–17)
BUN BLDV-MCNC: 12 MG/DL (ref 6–20)
BUN/CREAT BLD: 14 (ref 9–20)
CALCIUM SERPL-MCNC: 10 MG/DL (ref 8.6–10.4)
CHLORIDE BLD-SCNC: 101 MMOL/L (ref 98–107)
CO2: 27 MMOL/L (ref 20–31)
CREAT SERPL-MCNC: 0.84 MG/DL (ref 0.5–0.9)
GFR AFRICAN AMERICAN: >60 ML/MIN
GFR NON-AFRICAN AMERICAN: >60 ML/MIN
GFR SERPL CREATININE-BSD FRML MDRD: NORMAL ML/MIN/{1.73_M2}
GFR SERPL CREATININE-BSD FRML MDRD: NORMAL ML/MIN/{1.73_M2}
GLUCOSE BLD-MCNC: 93 MG/DL (ref 70–99)
POTASSIUM SERPL-SCNC: 4.3 MMOL/L (ref 3.7–5.3)
SODIUM BLD-SCNC: 140 MMOL/L (ref 135–144)
URIC ACID: 5.3 MG/DL (ref 2.4–5.7)

## 2019-01-15 PROCEDURE — 1036F TOBACCO NON-USER: CPT | Performed by: FAMILY MEDICINE

## 2019-01-15 PROCEDURE — 36415 COLL VENOUS BLD VENIPUNCTURE: CPT

## 2019-01-15 PROCEDURE — 99214 OFFICE O/P EST MOD 30 MIN: CPT | Performed by: FAMILY MEDICINE

## 2019-01-15 PROCEDURE — G8484 FLU IMMUNIZE NO ADMIN: HCPCS | Performed by: FAMILY MEDICINE

## 2019-01-15 PROCEDURE — G8427 DOCREV CUR MEDS BY ELIG CLIN: HCPCS | Performed by: FAMILY MEDICINE

## 2019-01-15 PROCEDURE — 84550 ASSAY OF BLOOD/URIC ACID: CPT

## 2019-01-15 PROCEDURE — G8419 CALC BMI OUT NRM PARAM NOF/U: HCPCS | Performed by: FAMILY MEDICINE

## 2019-01-15 PROCEDURE — 80048 BASIC METABOLIC PNL TOTAL CA: CPT

## 2019-01-15 RX ORDER — SODIUM CHLORIDE/ALOE VERA
GEL (GRAM) NASAL PRN
Qty: 1 TUBE | Refills: 3 | Status: SHIPPED | OUTPATIENT
Start: 2019-01-15 | End: 2019-05-31 | Stop reason: SDUPTHER

## 2019-01-15 ASSESSMENT — PATIENT HEALTH QUESTIONNAIRE - PHQ9
SUM OF ALL RESPONSES TO PHQ QUESTIONS 1-9: 0
2. FEELING DOWN, DEPRESSED OR HOPELESS: 0
SUM OF ALL RESPONSES TO PHQ QUESTIONS 1-9: 0
SUM OF ALL RESPONSES TO PHQ9 QUESTIONS 1 & 2: 0
1. LITTLE INTEREST OR PLEASURE IN DOING THINGS: 0

## 2019-01-15 ASSESSMENT — ENCOUNTER SYMPTOMS: GASTROINTESTINAL NEGATIVE: 1

## 2019-01-24 ENCOUNTER — HOSPITAL ENCOUNTER (OUTPATIENT)
Dept: MRI IMAGING | Age: 49
Discharge: HOME | End: 2019-01-26
Payer: COMMERCIAL

## 2019-01-24 DIAGNOSIS — M79.674 GREAT TOE PAIN, RIGHT: ICD-10-CM

## 2019-01-24 PROCEDURE — 73718 MRI LOWER EXTREMITY W/O DYE: CPT

## 2019-02-08 ENCOUNTER — OFFICE VISIT (OUTPATIENT)
Dept: FAMILY MEDICINE CLINIC | Age: 49
End: 2019-02-08
Payer: COMMERCIAL

## 2019-02-08 ENCOUNTER — HOSPITAL ENCOUNTER (OUTPATIENT)
Age: 49
Setting detail: SPECIMEN
Discharge: HOME | End: 2019-02-08
Payer: COMMERCIAL

## 2019-02-08 DIAGNOSIS — D48.5 NEOPLASM OF UNCERTAIN BEHAVIOR OF SKIN OF CHEST: Primary | ICD-10-CM

## 2019-02-08 PROBLEM — K80.20 SYMPTOMATIC CHOLELITHIASIS: Status: RESOLVED | Noted: 2017-05-01 | Resolved: 2019-02-08

## 2019-02-08 LAB — COMMENT: NORMAL

## 2019-02-08 PROCEDURE — 88305 TISSUE EXAM BY PATHOLOGIST: CPT

## 2019-02-08 PROCEDURE — 11102 TANGNTL BX SKIN SINGLE LES: CPT | Performed by: FAMILY MEDICINE

## 2019-02-11 LAB — DERMATOLOGY PATHOLOGY REPORT: NORMAL

## 2019-02-13 ENCOUNTER — OFFICE VISIT (OUTPATIENT)
Dept: FAMILY MEDICINE CLINIC | Age: 49
End: 2019-02-13
Payer: COMMERCIAL

## 2019-02-13 VITALS
HEIGHT: 68 IN | WEIGHT: 190 LBS | DIASTOLIC BLOOD PRESSURE: 78 MMHG | TEMPERATURE: 98.3 F | SYSTOLIC BLOOD PRESSURE: 130 MMHG | BODY MASS INDEX: 28.79 KG/M2

## 2019-02-13 DIAGNOSIS — J06.9 VIRAL URI: Primary | ICD-10-CM

## 2019-02-13 PROCEDURE — 99213 OFFICE O/P EST LOW 20 MIN: CPT | Performed by: FAMILY MEDICINE

## 2019-02-13 PROCEDURE — G8484 FLU IMMUNIZE NO ADMIN: HCPCS | Performed by: FAMILY MEDICINE

## 2019-02-13 PROCEDURE — G8419 CALC BMI OUT NRM PARAM NOF/U: HCPCS | Performed by: FAMILY MEDICINE

## 2019-02-13 PROCEDURE — 1036F TOBACCO NON-USER: CPT | Performed by: FAMILY MEDICINE

## 2019-02-13 PROCEDURE — G8427 DOCREV CUR MEDS BY ELIG CLIN: HCPCS | Performed by: FAMILY MEDICINE

## 2019-02-13 RX ORDER — CHOLECALCIFEROL (VITD3)/VIT K2 137.5-2
TABLET ORAL
Refills: 1 | COMMUNITY
Start: 2019-02-07 | End: 2022-01-14 | Stop reason: SDUPTHER

## 2019-02-17 ASSESSMENT — ENCOUNTER SYMPTOMS: GASTROINTESTINAL NEGATIVE: 1

## 2019-02-26 DIAGNOSIS — K21.9 GASTROESOPHAGEAL REFLUX DISEASE WITHOUT ESOPHAGITIS: ICD-10-CM

## 2019-02-26 RX ORDER — OMEPRAZOLE 20 MG/1
CAPSULE, DELAYED RELEASE ORAL
Qty: 30 CAPSULE | Refills: 3 | Status: SHIPPED | OUTPATIENT
Start: 2019-02-26 | End: 2019-05-31 | Stop reason: SDUPTHER

## 2019-03-11 ENCOUNTER — HOSPITAL ENCOUNTER (OUTPATIENT)
Dept: MAMMOGRAPHY | Age: 49
Discharge: HOME | End: 2019-03-13
Payer: COMMERCIAL

## 2019-03-11 DIAGNOSIS — Z12.39 BREAST CANCER SCREENING: ICD-10-CM

## 2019-03-11 PROCEDURE — 77067 SCR MAMMO BI INCL CAD: CPT

## 2019-05-31 ENCOUNTER — OFFICE VISIT (OUTPATIENT)
Dept: FAMILY MEDICINE CLINIC | Age: 49
End: 2019-05-31
Payer: COMMERCIAL

## 2019-05-31 ENCOUNTER — HOSPITAL ENCOUNTER (OUTPATIENT)
Age: 49
Discharge: HOME | End: 2019-05-31
Payer: COMMERCIAL

## 2019-05-31 VITALS
HEIGHT: 68 IN | WEIGHT: 187 LBS | DIASTOLIC BLOOD PRESSURE: 74 MMHG | OXYGEN SATURATION: 98 % | SYSTOLIC BLOOD PRESSURE: 132 MMHG | HEART RATE: 71 BPM | BODY MASS INDEX: 28.34 KG/M2

## 2019-05-31 DIAGNOSIS — E66.3 OVERWEIGHT: ICD-10-CM

## 2019-05-31 DIAGNOSIS — R53.82 CHRONIC FATIGUE: ICD-10-CM

## 2019-05-31 DIAGNOSIS — M54.16 BILATERAL LUMBAR RADICULOPATHY: ICD-10-CM

## 2019-05-31 DIAGNOSIS — M21.372 LEFT FOOT DROP: Primary | ICD-10-CM

## 2019-05-31 DIAGNOSIS — I87.2 VENOUS INSUFFICIENCY: ICD-10-CM

## 2019-05-31 LAB
ABSOLUTE EOS #: 0.1 K/UL (ref 0–0.4)
ABSOLUTE IMMATURE GRANULOCYTE: NORMAL K/UL (ref 0–0.3)
ABSOLUTE LYMPH #: 1.9 K/UL (ref 1–4.8)
ABSOLUTE MONO #: 0.5 K/UL (ref 0–1)
BASOPHILS # BLD: 0 % (ref 0–2)
BASOPHILS ABSOLUTE: 0 K/UL (ref 0–0.2)
DIFFERENTIAL TYPE: YES
EOSINOPHILS RELATIVE PERCENT: 1 % (ref 0–5)
HCT VFR BLD CALC: 38 % (ref 36–46)
HEMOGLOBIN: 12.7 G/DL (ref 12–16)
IMMATURE GRANULOCYTES: NORMAL %
LYMPHOCYTES # BLD: 27 % (ref 15–40)
MCH RBC QN AUTO: 29.1 PG (ref 26–34)
MCHC RBC AUTO-ENTMCNC: 33.5 G/DL (ref 31–37)
MCV RBC AUTO: 86.9 FL (ref 80–100)
MONOCYTES # BLD: 7 % (ref 4–8)
NRBC AUTOMATED: NORMAL PER 100 WBC
PDW BLD-RTO: 13.5 % (ref 12.1–15.2)
PLATELET # BLD: 348 K/UL (ref 140–450)
PLATELET ESTIMATE: NORMAL
PMV BLD AUTO: NORMAL FL (ref 6–12)
RBC # BLD: 4.37 M/UL (ref 4–5.2)
RBC # BLD: NORMAL 10*6/UL
SEG NEUTROPHILS: 65 % (ref 47–75)
SEGMENTED NEUTROPHILS ABSOLUTE COUNT: 4.4 K/UL (ref 2.5–7)
TSH SERPL DL<=0.05 MIU/L-ACNC: 3.13 MIU/L (ref 0.3–5)
WBC # BLD: 6.9 K/UL (ref 3.5–11)
WBC # BLD: NORMAL 10*3/UL

## 2019-05-31 PROCEDURE — 85025 COMPLETE CBC W/AUTO DIFF WBC: CPT

## 2019-05-31 PROCEDURE — 36415 COLL VENOUS BLD VENIPUNCTURE: CPT

## 2019-05-31 PROCEDURE — G8427 DOCREV CUR MEDS BY ELIG CLIN: HCPCS | Performed by: FAMILY MEDICINE

## 2019-05-31 PROCEDURE — 1036F TOBACCO NON-USER: CPT | Performed by: FAMILY MEDICINE

## 2019-05-31 PROCEDURE — G8419 CALC BMI OUT NRM PARAM NOF/U: HCPCS | Performed by: FAMILY MEDICINE

## 2019-05-31 PROCEDURE — 99214 OFFICE O/P EST MOD 30 MIN: CPT | Performed by: FAMILY MEDICINE

## 2019-05-31 PROCEDURE — 84443 ASSAY THYROID STIM HORMONE: CPT

## 2019-05-31 RX ORDER — OMEPRAZOLE 20 MG/1
CAPSULE, DELAYED RELEASE ORAL
Qty: 30 CAPSULE | Refills: 5 | Status: SHIPPED | OUTPATIENT
Start: 2019-05-31 | End: 2020-02-25 | Stop reason: ALTCHOICE

## 2019-05-31 RX ORDER — PHENTERMINE HYDROCHLORIDE 37.5 MG/1
37.5 TABLET ORAL
Qty: 30 TABLET | Refills: 0 | Status: SHIPPED | OUTPATIENT
Start: 2019-05-31 | End: 2019-06-28 | Stop reason: SDUPTHER

## 2019-05-31 RX ORDER — CITALOPRAM 40 MG/1
40 TABLET ORAL DAILY
Qty: 90 TABLET | Refills: 1 | Status: SHIPPED | OUTPATIENT
Start: 2019-05-31 | End: 2019-12-30

## 2019-05-31 RX ORDER — CELECOXIB 100 MG/1
100 CAPSULE ORAL 2 TIMES DAILY
Qty: 60 CAPSULE | Refills: 2 | Status: SHIPPED | OUTPATIENT
Start: 2019-05-31 | End: 2019-08-23 | Stop reason: SDUPTHER

## 2019-05-31 RX ORDER — SODIUM CHLORIDE/ALOE VERA
GEL (GRAM) NASAL PRN
Qty: 1 TUBE | Refills: 3 | Status: SHIPPED | OUTPATIENT
Start: 2019-05-31

## 2019-05-31 ASSESSMENT — PATIENT HEALTH QUESTIONNAIRE - PHQ9
SUM OF ALL RESPONSES TO PHQ9 QUESTIONS 1 & 2: 0
2. FEELING DOWN, DEPRESSED OR HOPELESS: 0
SUM OF ALL RESPONSES TO PHQ QUESTIONS 1-9: 0
SUM OF ALL RESPONSES TO PHQ QUESTIONS 1-9: 0
1. LITTLE INTEREST OR PLEASURE IN DOING THINGS: 0

## 2019-05-31 NOTE — PROGRESS NOTES
drugs. Family History:     Family History   Problem Relation Age of Onset    Diabetes Mother     Cancer Mother         Liver Cancer    Heart Disease Father        Review of Systems:     Positive and Negative as described in HPI    Review of Systems    Physical Exam:     Vitals:  /74   Pulse 71   Ht 5' 8\" (1.727 m)   Wt 187 lb (84.8 kg)   SpO2 98%   BMI 28.43 kg/m²   Physical Exam    Data:     Lab Results   Component Value Date     01/15/2019    K 4.3 01/15/2019     01/15/2019    CO2 27 01/15/2019    BUN 12 01/15/2019    CREATININE 0.84 01/15/2019    GLUCOSE 93 01/15/2019    PROT 7.1 11/07/2018    LABALBU 4.0 11/07/2018    BILITOT 0.25 11/07/2018    ALKPHOS 91 11/07/2018    AST 19 11/07/2018    ALT 16 11/07/2018     Lab Results   Component Value Date    WBC 7.4 11/07/2018    RBC 4.19 11/07/2018    HGB 12.2 11/07/2018    HCT 36.7 11/07/2018    MCV 87.7 11/07/2018    MCH 29.1 11/07/2018    MCHC 33.2 11/07/2018    RDW 14.4 11/07/2018     11/07/2018    MPV NOT REPORTED 11/07/2018     Lab Results   Component Value Date    TSH 4.75 11/07/2018     Lab Results   Component Value Date    CHOL 216 11/07/2018    HDL 76 11/07/2018         Assessment & Plan:        Diagnosis Orders   1. Gastroesophageal reflux disease without esophagitis  omeprazole (PRILOSEC) 20 MG delayed release capsule       ***    Requested Prescriptions     Pending Prescriptions Disp Refills    citalopram (CELEXA) 40 MG tablet 90 tablet 1     Sig: Take 1 tablet by mouth daily    celecoxib (CELEBREX) 100 MG capsule 60 capsule 2     Sig: Take 1 capsule by mouth 2 times daily    saline nasal gel (AYR) GEL 1 Tube 3     Sig: by Nasal route as needed for Congestion    omeprazole (PRILOSEC) 20 MG delayed release capsule 30 capsule 5     Sig: take 1 capsule by mouth once daily       Patient Instructions     SURVEY:    You may be receiving a survey from [employer] regarding your visit today.     Please complete the survey to enable us to provide the highest quality of care to you and your family. If you cannot score us as very good on any question, please call the office to discuss how we could have made your experience exceptional.     Thank you.         {Provider IAAY:[id number]}    Electronically signed by Promise Valenzuela MA on 5/31/2019 at 9:42 AM   [address] [address]  Dept: [phone]

## 2019-05-31 NOTE — PATIENT INSTRUCTIONS
SURVEY:    You may be receiving a survey from [employer] regarding your visit today. Please complete the survey to enable us to provide the highest quality of care to you and your family. If you cannot score us as very good on any question, please call the office to discuss how we could have made your experience exceptional.     Thank you.

## 2019-05-31 NOTE — PROGRESS NOTES
Name: Nain Carpenter  : 1970         Chief Complaint:     Chief Complaint   Patient presents with    Leg Pain     nodules popping out on legs       History of Present Illness:      Nain Carpenter is a 57 y.o.  female who presents with Leg Pain (nodules popping out on legs)      HPI     C/o ongoing osvaldo foot pain, R>L. Wears toe separator and [employer] but still has pain all the time. After breaks at work also has a lot of pain in osvaldo knees and distal thighs, anne-marie on the L. Bothers at night also and she has jumpy feelings in legs. Inadequate help from tylenol and ibuprofen. Noticing bulging veins in osvaldo lower ext, worst in L medial distal thigh. Also has a burning pain L buttock and posterior thigh. She did not bring it up, but when I asked, she says that yes, she has noticed dragging of her left foot and that other people, including coworkers, have noticed it also. She thought that it was because of losing the arch in her left foot. Has put on weight and is feeling really tired, has no energy. Avoids fast food and ice cream. Only splurges when she's on vacation. She has not been exercising recently due to poor motivation and energy. Interested in medication to help with energy and weight. Continues to be anxious at times despite use of Celexa daily. As a symptom of anxiety she pulls off her toenails. She has done this for a long time. She denies any other self-harm or self mutilating behavior.     Past Medical History:     Past Medical History:   Diagnosis Date    Anxiety     Depression     Ulcer     stomach        Past Surgical History:     Past Surgical History:   Procedure Laterality Date     SECTION      CHOLECYSTECTOMY, LAPAROSCOPIC N/A 2017    CHOLECYSTECTOMY LAPAROSCOPIC; photos performed by Theo Bateman MD at [address] [facility]        Medications:       Prior to Admission medications    Medication Sig Start Date End Date Taking? Authorizing Provider   citalopram (CELEXA) 40 MG tablet Take 1 tablet by mouth daily 5/31/19  Yes Memo Girard, DO   celecoxib (CELEBREX) 100 MG capsule Take 1 capsule by mouth 2 times daily 5/31/19  Yes Memo Girard, DO   saline nasal gel (AYR) GEL by Nasal route as needed for Congestion 5/31/19  Yes Memo Girard, DO   omeprazole (PRILOSEC) 20 MG delayed release capsule take 1 capsule by mouth once daily 5/31/19  Yes Memo Girard, DO   phentermine (ADIPEX-P) 37.5 MG tablet Take 1 tablet by mouth every morning (before breakfast) for 30 days. 5/31/19 6/30/19 Yes Memo Girard, DO   Vitamin D-Vitamin K [facility]) 5500-200 UNIT-MCG TABS take 1 tablet by mouth daily 2/7/19  Yes Historical Provider, MD   fluticasone (FLONASE) 50 MCG/ACT nasal spray 1 spray by Each Nare route daily 10/23/18  Yes Mariah Gardner MD   acetaminophen (TYLENOL) 325 MG tablet Take 650 mg by mouth every 6 hours as needed. Yes Historical Provider, MD        Allergies:       Vicodin [hydrocodone-acetaminophen]    Social History:     Tobacco:    reports that she quit smoking about 18 years ago. She quit after 6.00 years of use. She has never used smokeless tobacco.  Alcohol:      reports that she does not drink alcohol. Drug Use:  reports that she does not use drugs. Family History:     Family History   Problem Relation Age of Onset    Diabetes Mother     Cancer Mother         Liver Cancer    Heart Disease Father        Review of Systems:     Positive and Negative as described in HPI    Review of Systems   Constitutional: Negative. Respiratory: Negative. Gastrointestinal: Negative. Physical Exam:     Vitals:  /74   Pulse 71   Ht 5' 8\" (1.727 m)   Wt 187 lb (84.8 kg)   SpO2 98%   BMI 28.43 kg/m²   Physical Exam   Constitutional: She is oriented to person, place, and time. She appears well-developed and well-nourished. No distress. HENT:   Head: Normocephalic and atraumatic. Eyes: Conjunctivae and EOM are normal.   Cardiovascular: Normal rate, regular rhythm, normal heart sounds and intact distal pulses. osvaldo feet warm and well-perfused, digital hair present, brisk cap refill in toes. Small-caliber varicosities/spider veins on R anterolateral prox leg. Large caliber dilated varicose veins L distal medial thigh. No pitting edema in lower extremities. L femoral pulse 2+. Pulmonary/Chest: Effort normal and breath sounds normal.   Musculoskeletal:   Spinal curves wnl. Neg SLR bilaterally. Pelvis normal alignment. Neurological: She is alert and oriented to person, place, and time. osvaldo patellar reflexes 2+. L great toe extension 0/5 strength. 3/5 strength L dorsiflexion. Skin: Skin is warm and dry. Psychiatric: She has a normal mood and affect. Judgment normal.   Nursing note and vitals reviewed. Data:     Lab Results   Component Value Date     01/15/2019    K 4.3 01/15/2019     01/15/2019    CO2 27 01/15/2019    BUN 12 01/15/2019    CREATININE 0.84 01/15/2019    GLUCOSE 93 01/15/2019    PROT 7.1 11/07/2018    LABALBU 4.0 11/07/2018    BILITOT 0.25 11/07/2018    ALKPHOS 91 11/07/2018    AST 19 11/07/2018    ALT 16 11/07/2018     Lab Results   Component Value Date    WBC 6.9 05/31/2019    RBC 4.37 05/31/2019    HGB 12.7 05/31/2019    HCT 38.0 05/31/2019    MCV 86.9 05/31/2019    MCH 29.1 05/31/2019    MCHC 33.5 05/31/2019    RDW 13.5 05/31/2019     05/31/2019    MPV NOT REPORTED 05/31/2019     Lab Results   Component Value Date    TSH 3.13 05/31/2019     Lab Results   Component Value Date    CHOL 216 11/07/2018    HDL 76 11/07/2018         Assessment & Plan:        Diagnosis Orders   1. Left foot drop  MRI LUMBAR SPINE WO CONTRAST   2. Bilateral lumbar radiculopathy  MRI LUMBAR SPINE WO CONTRAST   3. Venous insufficiency  phentermine (ADIPEX-P) 37.5 MG tablet    TSH with Reflex    CBC Auto Differential   4.  Overweight  phentermine (ADIPEX-P) 37.5 MG tablet   5. Chronic fatigue  TSH with Reflex    CBC Auto Differential   chronic low back pain with radicular pain in osvaldo lower extremities, anne-marie on L. Has developed L foot drop and has 0/5 strength of L great toe extension. Suspect she may have lower lumbar disc herniation causing nerve compression. MRI ordered d/t weakness. Varicose veins and venous insufficiency - advised compression stockings, will need to see where she can obtain thigh- or waist-high ones. Work on weight loss. May need vascular surgery referral.  Overweight and has venous insufficiency with pain and varicosities. Would be helped by weight loss. adipex prescribed and work on diet and exercise. Labs for fatigue. adipex will likely help but I advised at length re temporary use of med. Requested Prescriptions     Signed Prescriptions Disp Refills    citalopram (CELEXA) 40 MG tablet 90 tablet 1     Sig: Take 1 tablet by mouth daily    celecoxib (CELEBREX) 100 MG capsule 60 capsule 2     Sig: Take 1 capsule by mouth 2 times daily    saline nasal gel (AYR) GEL 1 Tube 3     Sig: by Nasal route as needed for Congestion    omeprazole (PRILOSEC) 20 MG delayed release capsule 30 capsule 5     Sig: take 1 capsule by mouth once daily    phentermine (ADIPEX-P) 37.5 MG tablet 30 tablet 0     Sig: Take 1 tablet by mouth every morning (before breakfast) for 30 days. Patient Instructions     SURVEY:    You may be receiving a survey from [employer] regarding your visit today. Please complete the survey to enable us to provide the highest quality of care to you and your family. If you cannot score us as very good on any question, please call the office to discuss how we could have made your experience exceptional.     Thank you. Julee Romero received counseling on the following healthy behaviors: medication adherence  Reviewed prior labs and health maintenance. Continue current medications, diet and exercise.   Discussed use, benefit, and side effects of prescribed medications. Barriers to medication compliance addressed. Patient given educational materials - see patient instructions. All patient questions answered. Patient voiced understanding.      Electronically signed by Mary Grace Boyer DO on 6/2/2019 at 8:23 PM   [address]  [address] [place] [address] [address] [place] [id number]  Dept: [phone]

## 2019-06-02 ASSESSMENT — ENCOUNTER SYMPTOMS
RESPIRATORY NEGATIVE: 1
GASTROINTESTINAL NEGATIVE: 1

## 2019-06-03 ENCOUNTER — TELEPHONE (OUTPATIENT)
Dept: FAMILY MEDICINE CLINIC | Age: 49
End: 2019-06-03

## 2019-06-03 NOTE — TELEPHONE ENCOUNTER
----- Message from Aletha Puckett sent at 6/3/2019  9:56 AM EDT -----  Patient called stating she feels so much better on Adipex / more energy. Informed of results and is checking with Drug- [facility] as well as Fletcher logan for compression hose. Will call back if any issues.

## 2019-06-28 ENCOUNTER — OFFICE VISIT (OUTPATIENT)
Dept: FAMILY MEDICINE CLINIC | Age: 49
End: 2019-06-28
Payer: COMMERCIAL

## 2019-06-28 VITALS
SYSTOLIC BLOOD PRESSURE: 126 MMHG | HEIGHT: 68 IN | WEIGHT: 179 LBS | OXYGEN SATURATION: 99 % | DIASTOLIC BLOOD PRESSURE: 78 MMHG | BODY MASS INDEX: 27.13 KG/M2 | HEART RATE: 77 BPM

## 2019-06-28 DIAGNOSIS — E66.3 OVERWEIGHT: ICD-10-CM

## 2019-06-28 DIAGNOSIS — I87.2 VENOUS INSUFFICIENCY: Primary | ICD-10-CM

## 2019-06-28 PROCEDURE — 99213 OFFICE O/P EST LOW 20 MIN: CPT | Performed by: FAMILY MEDICINE

## 2019-06-28 PROCEDURE — G8419 CALC BMI OUT NRM PARAM NOF/U: HCPCS | Performed by: FAMILY MEDICINE

## 2019-06-28 PROCEDURE — G8427 DOCREV CUR MEDS BY ELIG CLIN: HCPCS | Performed by: FAMILY MEDICINE

## 2019-06-28 PROCEDURE — 1036F TOBACCO NON-USER: CPT | Performed by: FAMILY MEDICINE

## 2019-06-28 RX ORDER — PHENTERMINE HYDROCHLORIDE 37.5 MG/1
37.5 TABLET ORAL
Qty: 30 TABLET | Refills: 0 | Status: SHIPPED | OUTPATIENT
Start: 2019-06-28 | End: 2019-07-30 | Stop reason: SDUPTHER

## 2019-06-28 ASSESSMENT — ENCOUNTER SYMPTOMS
RESPIRATORY NEGATIVE: 1
GASTROINTESTINAL NEGATIVE: 1

## 2019-06-28 NOTE — PROGRESS NOTES
daily 5/31/19  Yes Eladia Medley, DO   Vitamin D-Vitamin K [facility]) 5500-200 UNIT-MCG TABS take 1 tablet by mouth daily 2/7/19  Yes Historical Provider, MD   fluticasone (FLONASE) 50 MCG/ACT nasal spray 1 spray by Each Nare route daily 10/23/18  Yes Logan Maxwlel MD   acetaminophen (TYLENOL) 325 MG tablet Take 650 mg by mouth every 6 hours as needed. Yes Historical Provider, MD        Allergies:       Vicodin [hydrocodone-acetaminophen]    Social History:     Tobacco:    reports that she quit smoking about 18 years ago. She quit after 6.00 years of use. She has never used smokeless tobacco.  Alcohol:      reports that she does not drink alcohol. Drug Use:  reports that she does not use drugs. Family History:     Family History   Problem Relation Age of Onset    Diabetes Mother     Cancer Mother         Liver Cancer    Heart Disease Father        Review of Systems:     Positive and Negative as described in HPI    Review of Systems   Constitutional: Negative. Respiratory: Negative. Gastrointestinal: Negative. Physical Exam:     Vitals:  /78   Pulse 77   Ht 5' 8\" (1.727 m)   Wt 179 lb (81.2 kg)   SpO2 99%   BMI 27.22 kg/m²   Physical Exam   Constitutional: She is oriented to person, place, and time. She appears well-developed and well-nourished. No distress. HENT:   Head: Normocephalic and atraumatic. Eyes: Conjunctivae and EOM are normal.   Cardiovascular: Normal rate, regular rhythm and normal heart sounds. No peripheral edema. Pulmonary/Chest: Effort normal and breath sounds normal.   Neurological: She is alert and oriented to person, place, and time. Skin: Skin is warm and dry. Psychiatric: She has a normal mood and affect. Judgment normal.   Nursing note and vitals reviewed.       Data:     Lab Results   Component Value Date     01/15/2019    K 4.3 01/15/2019     01/15/2019    CO2 27 01/15/2019    BUN 12 01/15/2019    CREATININE 0.84 01/15/2019 GLUCOSE 93 01/15/2019    PROT 7.1 11/07/2018    LABALBU 4.0 11/07/2018    BILITOT 0.25 11/07/2018    ALKPHOS 91 11/07/2018    AST 19 11/07/2018    ALT 16 11/07/2018     Lab Results   Component Value Date    WBC 6.9 05/31/2019    RBC 4.37 05/31/2019    HGB 12.7 05/31/2019    HCT 38.0 05/31/2019    MCV 86.9 05/31/2019    MCH 29.1 05/31/2019    MCHC 33.5 05/31/2019    RDW 13.5 05/31/2019     05/31/2019    MPV NOT REPORTED 05/31/2019     Lab Results   Component Value Date    TSH 3.13 05/31/2019     Lab Results   Component Value Date    CHOL 216 11/07/2018    HDL 76 11/07/2018         Assessment & Plan:        Diagnosis Orders   1. Venous insufficiency  phentermine (ADIPEX-P) 37.5 MG tablet   2. Overweight  phentermine (ADIPEX-P) 37.5 MG tablet   Venous insufficiency improved with use of compression stockings, weight loss. Continue same. Overweight, 8 pound weight loss in the past month. Tolerating Adipex well and doing a great job with diet and exercise. Is still eating a lot of chips. Advised that she change to a smaller bag or consider trying pork rinds instead. Requested Prescriptions     Signed Prescriptions Disp Refills    phentermine (ADIPEX-P) 37.5 MG tablet 30 tablet 0     Sig: Take 1 tablet by mouth every morning (before breakfast) for 30 days. Patient Instructions     SURVEY:    You may be receiving a survey from [employer] regarding your visit today. Please complete the survey to enable us to provide the highest quality of care to you and your family. If you cannot score us as very good on any question, please call the office to discuss how we could have made your experience exceptional.     Thank you. Marely Cao received counseling on the following healthy behaviors: medication adherence  Reviewed prior labs and health maintenance. Continue current medications, diet and exercise. Discussed use, benefit, and side effects of prescribed medications.  Barriers to medication compliance addressed. Patient given educational materials - see patient instructions. All patient questions answered. Patient voiced understanding.      Electronically signed by Gali Champion DO on 6/28/2019 at 3:23 PM   [address] [address]  Dept: [phone]

## 2019-07-29 ENCOUNTER — HOSPITAL ENCOUNTER (OUTPATIENT)
Dept: MRI IMAGING | Age: 49
Discharge: HOME | End: 2019-07-31
Payer: COMMERCIAL

## 2019-07-29 ENCOUNTER — HOSPITAL ENCOUNTER (OUTPATIENT)
Dept: MRI IMAGING | Age: 49
Discharge: HOME | End: 2019-06-14
Payer: COMMERCIAL

## 2019-07-29 DIAGNOSIS — M54.16 BILATERAL LUMBAR RADICULOPATHY: ICD-10-CM

## 2019-07-29 DIAGNOSIS — M21.372 LEFT FOOT DROP: ICD-10-CM

## 2019-07-29 PROCEDURE — 72148 MRI LUMBAR SPINE W/O DYE: CPT

## 2019-07-30 ENCOUNTER — OFFICE VISIT (OUTPATIENT)
Dept: FAMILY MEDICINE CLINIC | Age: 49
End: 2019-07-30
Payer: COMMERCIAL

## 2019-07-30 VITALS
HEIGHT: 68 IN | WEIGHT: 171.5 LBS | HEART RATE: 72 BPM | OXYGEN SATURATION: 98 % | DIASTOLIC BLOOD PRESSURE: 82 MMHG | SYSTOLIC BLOOD PRESSURE: 118 MMHG | BODY MASS INDEX: 25.99 KG/M2

## 2019-07-30 DIAGNOSIS — M51.36 DISC DEGENERATION, LUMBAR: ICD-10-CM

## 2019-07-30 DIAGNOSIS — M21.372 LEFT FOOT DROP: ICD-10-CM

## 2019-07-30 DIAGNOSIS — I83.812 VARICOSE VEINS OF LEFT LOWER EXTREMITY WITH PAIN: ICD-10-CM

## 2019-07-30 DIAGNOSIS — I87.2 VENOUS INSUFFICIENCY: ICD-10-CM

## 2019-07-30 DIAGNOSIS — E66.3 OVERWEIGHT: ICD-10-CM

## 2019-07-30 DIAGNOSIS — M79.652 LEFT THIGH PAIN: Primary | ICD-10-CM

## 2019-07-30 PROCEDURE — G8419 CALC BMI OUT NRM PARAM NOF/U: HCPCS | Performed by: FAMILY MEDICINE

## 2019-07-30 PROCEDURE — G8427 DOCREV CUR MEDS BY ELIG CLIN: HCPCS | Performed by: FAMILY MEDICINE

## 2019-07-30 PROCEDURE — 99214 OFFICE O/P EST MOD 30 MIN: CPT | Performed by: FAMILY MEDICINE

## 2019-07-30 PROCEDURE — 1036F TOBACCO NON-USER: CPT | Performed by: FAMILY MEDICINE

## 2019-07-30 RX ORDER — PHENTERMINE HYDROCHLORIDE 37.5 MG/1
37.5 TABLET ORAL
Qty: 30 TABLET | Refills: 0 | Status: SHIPPED | OUTPATIENT
Start: 2019-07-30 | End: 2020-04-13 | Stop reason: SDUPTHER

## 2019-07-30 ASSESSMENT — ENCOUNTER SYMPTOMS
GASTROINTESTINAL NEGATIVE: 1
RESPIRATORY NEGATIVE: 1

## 2019-07-30 NOTE — PROGRESS NOTES
Name: Mliss Riedel  : 1970         Chief Complaint:     Chief Complaint   Patient presents with    Obesity       History of Present Illness:      Mliss Riedel is a 57 y.o.  female who presents with Obesity      HPI     C/o L medial thigh pain in area of knotty varicose veins. It sometimes feels like a crawling feeling. It bothers her as soon as she sits down and also when she gets in bed at night. Pain does wake her up during the night also. It does not tend to bother her with activity. 1 of the veins seems to be more prominent now, but there has been no development of a firm spot and no color change to the leg. Both legs seem to be a little more puffy since she has been on feet more, having to work double shifts due to under staffing at work. Also continues to have weakness of the left foot in dorsiflexion, trips over the foot at work. Had lumbar MRI yesterday. Back has been a little sore. F/u overweight. Continues to do well with weight loss efforts. Hasn't been to gym in the past couple wks d/t vacation and then work schedule but has stayed active, walked 10 miles . Did well with diet on vacation, watched what she ate. Still really likes adipex, anne-marie the energy she gets from it. No adverse effects. Past Medical History:     Past Medical History:   Diagnosis Date    Anxiety     Depression     Ulcer     stomach        Past Surgical History:     Past Surgical History:   Procedure Laterality Date     SECTION      CHOLECYSTECTOMY, LAPAROSCOPIC N/A 2017    CHOLECYSTECTOMY LAPAROSCOPIC; photos performed by Cindy Gottlieb MD at [facility]        Medications:       Prior to Admission medications    Medication Sig Start Date End Date Taking? Authorizing Provider   phentermine (ADIPEX-P) 37.5 MG tablet Take 1 tablet by mouth every morning (before breakfast) for 30 days.  19 Yes Robbie Ruelas, DO   citalopram present in the left distal medial thigh. No phlebitis or venous cord palpable. Bilateral calves appear to be symmetric in circumference. Again no pitting edema. No erythema or cyanosis. Pulmonary/Chest: Effort normal and breath sounds normal.   Neurological: She is alert and oriented to person, place, and time. Skin: Skin is warm and dry. Psychiatric: She has a normal mood and affect. Judgment normal.   Nursing note and vitals reviewed. Data:     Lab Results   Component Value Date     01/15/2019    K 4.3 01/15/2019     01/15/2019    CO2 27 01/15/2019    BUN 12 01/15/2019    CREATININE 0.84 01/15/2019    GLUCOSE 93 01/15/2019    PROT 7.1 11/07/2018    LABALBU 4.0 11/07/2018    BILITOT 0.25 11/07/2018    ALKPHOS 91 11/07/2018    AST 19 11/07/2018    ALT 16 11/07/2018     Lab Results   Component Value Date    WBC 6.9 05/31/2019    RBC 4.37 05/31/2019    HGB 12.7 05/31/2019    HCT 38.0 05/31/2019    MCV 86.9 05/31/2019    MCH 29.1 05/31/2019    MCHC 33.5 05/31/2019    RDW 13.5 05/31/2019     05/31/2019    MPV NOT REPORTED 05/31/2019     Lab Results   Component Value Date    TSH 3.13 05/31/2019     Lab Results   Component Value Date    CHOL 216 11/07/2018    HDL 76 11/07/2018         Assessment & Plan:        Diagnosis Orders   1. Left thigh pain     2. Left foot drop  External Referral To Neurosurgery   3. Disc degeneration, lumbar  External Referral To Neurosurgery   4. Varicose veins of left lower extremity with pain     5. Overweight  phentermine (ADIPEX-P) 37.5 MG tablet   6. Venous insufficiency  phentermine (ADIPEX-P) 37.5 MG tablet   new onset of neuralgic type of pain in L thigh, crawling sensation which is also painful. May be r/t lumbar radiculopathy, anne-marie as she also has L foot drop (described in last visit note) and MRI findings as above, but may also be r/t varicose veins. No s/s of DVT or phlebitis. Referred to NS for further eval of lumbar disease with L foot drop.  Cont have an account, please click on the \"Sign Up Now\" link. Current as of: September 20, 2018  Content Version: 12.0  © 2749-1357 Healthwise, Incorporated. Care instructions adapted under license by [facility] ([facility]). If you have questions about a medical condition or this instruction, always ask your healthcare professional. [place] [address] any warranty or liability for your use of this information. Anahi Hanna received counseling on the following healthy behaviors: medication adherence  Reviewed prior labs and health maintenance. Continue current medications, diet and exercise. Discussed use, benefit, and side effects of prescribed medications. Barriers to medication compliance addressed. Patient given educational materials - see patient instructions. All patient questions answered. Patient voiced understanding.      Electronically signed by Cash Carrillo DO on 7/30/2019 at 8:45 PM   [address]  Dept: [phone]

## 2019-07-30 NOTE — PATIENT INSTRUCTIONS
on your lower back). 3. Keep your lower back pressed to the floor. Hold for at least 15 to 30 seconds. 4. Relax, and lower the knee to the starting position. 5. Repeat with the other leg. Repeat 2 to 4 times with each leg. 6. To get more stretch, put your other leg flat on the floor while pulling your knee to your chest.    Curl-ups    1. Lie on the floor on your back with your knees bent at a 90-degree angle. Your feet should be flat on the floor, about 12 inches from your buttocks. 2. Cross your arms over your chest. If this bothers your neck, try putting your hands behind your neck (not your head), with your elbows spread apart. 3. Slowly tighten your belly muscles and raise your shoulder blades off the floor. 4. Keep your head in line with your body, and do not press your chin to your chest.  5. Hold this position for 1 or 2 seconds, then slowly lower yourself back down to the floor. 6. Repeat 8 to 12 times. Pelvic tilt exercise    1. Lie on your back with your knees bent. 2. \"Brace\" your stomach. This means to tighten your muscles by pulling in and imagining your belly button moving toward your spine. You should feel like your back is pressing to the floor and your hips and pelvis are rocking back. 3. Hold for about 6 seconds while you breathe smoothly. 4. Repeat 8 to 12 times. Heel dig bridging    1. Lie on your back with both knees bent and your ankles bent so that only your heels are digging into the floor. Your knees should be bent about 90 degrees. 2. Then push your heels into the floor, squeeze your buttocks, and lift your hips off the floor until your shoulders, hips, and knees are all in a straight line. 3. Hold for about 6 seconds as you continue to breathe normally, and then slowly lower your hips back down to the floor and rest for up to 10 seconds. 4. Do 8 to 12 repetitions. Hamstring stretch in doorway    1.  Lie on your back in a doorway, with one leg through the open door.  2. Slide your leg up the wall to straighten your knee. You should feel a gentle stretch down the back of your leg. 3. Hold the stretch for at least 15 to 30 seconds. Do not arch your back, point your toes, or bend either knee. Keep one heel touching the floor and the other heel touching the wall. 4. Repeat with your other leg. 5. Do 2 to 4 times for each leg. Hip flexor stretch    1. Kneel on the floor with one knee bent and one leg behind you. Place your forward knee over your foot. Keep your other knee touching the floor. 2. Slowly push your hips forward until you feel a stretch in the upper thigh of your rear leg. 3. Hold the stretch for at least 15 to 30 seconds. Repeat with your other leg. 4. Do 2 to 4 times on each side. Wall sit    1. Stand with your back 10 to 12 inches away from a wall. 2. Lean into the wall until your back is flat against it. 3. Slowly slide down until your knees are slightly bent, pressing your lower back into the wall. 4. Hold for about 6 seconds, then slide back up the wall. 5. Repeat 8 to 12 times. Follow-up care is a key part of your treatment and safety. Be sure to make and go to all appointments, and call your doctor if you are having problems. It's also a good idea to know your test results and keep a list of the medicines you take. Where can you learn more? Go to [email or web address] and sign in to your [employer] account. Enter T885 in the Closet[employer] box to learn more about \"Low Back Pain: Exercises. \"     If you do not have an account, please click on the \"Sign Up Now\" link. Current as of: September 20, 2018  Content Version: 12.0  © 2560-5752 Healthwise, Incorporated. Care instructions adapted under license by [facility] [employer] ([facility]).  If you have questions about a medical condition or this instruction, always ask your healthcare professional. [address]  any warranty or liability for your use of this

## 2019-08-13 ENCOUNTER — TELEPHONE (OUTPATIENT)
Dept: FAMILY MEDICINE CLINIC | Age: 49
End: 2019-08-13

## 2019-08-16 ENCOUNTER — OFFICE VISIT (OUTPATIENT)
Dept: FAMILY MEDICINE CLINIC | Age: 49
End: 2019-08-16
Payer: COMMERCIAL

## 2019-08-16 VITALS
SYSTOLIC BLOOD PRESSURE: 136 MMHG | OXYGEN SATURATION: 100 % | HEART RATE: 68 BPM | WEIGHT: 167 LBS | HEIGHT: 68 IN | BODY MASS INDEX: 25.31 KG/M2 | DIASTOLIC BLOOD PRESSURE: 74 MMHG

## 2019-08-16 DIAGNOSIS — F33.1 MODERATE EPISODE OF RECURRENT MAJOR DEPRESSIVE DISORDER (HCC): Primary | ICD-10-CM

## 2019-08-16 PROCEDURE — G8419 CALC BMI OUT NRM PARAM NOF/U: HCPCS | Performed by: FAMILY MEDICINE

## 2019-08-16 PROCEDURE — 99213 OFFICE O/P EST LOW 20 MIN: CPT | Performed by: FAMILY MEDICINE

## 2019-08-16 PROCEDURE — 1036F TOBACCO NON-USER: CPT | Performed by: FAMILY MEDICINE

## 2019-08-16 PROCEDURE — G8427 DOCREV CUR MEDS BY ELIG CLIN: HCPCS | Performed by: FAMILY MEDICINE

## 2019-08-16 RX ORDER — BUPROPION HYDROCHLORIDE 150 MG/1
150 TABLET ORAL EVERY MORNING
Qty: 30 TABLET | Refills: 1 | Status: SHIPPED | OUTPATIENT
Start: 2019-08-16 | End: 2019-10-16 | Stop reason: SDUPTHER

## 2019-08-16 ASSESSMENT — ENCOUNTER SYMPTOMS: GASTROINTESTINAL NEGATIVE: 1

## 2019-08-16 NOTE — PROGRESS NOTES
Name: Basilio Landaverde  : 1970         Chief Complaint:     Chief Complaint   Patient presents with    Anxiety     has been feeling worse. withdrawn. can't socialize. having some marital discord. misses her son. cries alot. not sleeping well.  Depression       History of Present Illness:      Bsailio Landaverde is a 57 y.o.  female who presents with Anxiety (has been feeling worse. withdrawn. can't socialize. having some marital discord. misses her son. cries alot. not sleeping well. ) and Depression      HPI    C/o mood more down for past few wks. she has struggled with depression for about 10 years and has currently been on Celexa since 2017. She is unsure what has worsened her mood recently but feels like withdrawing, unmotivated, down. She feels a lot of guilt related to her divorce. After patient and  got  4 years ago, she actually did think that it was her fault and told everyone they knew that it had been her fault. However, since then she has realized that her  had a lot to do with it also. However, it bothers her that everyone still thinks is her fault and she still struggles with guilt also. Recently she is also had a lot of thoughts about her mom's death, misses her mom ( 2 years ago), also thinks about ways that her mom did not always treat her the best when she was younger. Patient is comforted by knowing that she has not done the same bad things with her son. Patient believes that sleep deprivation may have quite a bit to do with her symptoms. Only getting about 3h of sleep per day. Taking tylenol PM to try to help her sleep more. Sleep limited by feeling like there's so much on her mind. Tylenol PM only helps for about the 3h and even that is only a light sleep according to her fitbit. Patient sometimes wonders why she is here but does not have thoughts of wanting to die and has no suicidal thoughts.   She lives with her fiancé who treats her well and is supportive. 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 Medical History:     Patient Active Problem List   Diagnosis    Anxiety    Insomnia    HTN (hypertension)    Venous insufficiency    Overweight       Medications:       Prior to Admission medications    Medication Sig Start Date End Date Taking? Authorizing Provider   buPROPion (WELLBUTRIN XL) 150 MG extended release tablet Take 1 tablet by mouth every morning 8/16/19  Yes Mortimer Seat, DO   phentermine (ADIPEX-P) 37.5 MG tablet Take 1 tablet by mouth every morning (before breakfast) for 30 days. 7/30/19 8/29/19 Yes Mortimer Seat, DO   citalopram (CELEXA) 40 MG tablet Take 1 tablet by mouth daily 5/31/19  Yes Mortimer Seat, DO   celecoxib (CELEBREX) 100 MG capsule Take 1 capsule by mouth 2 times daily 5/31/19  Yes Mortimer Seat, DO   saline nasal gel (AYR) GEL by Nasal route as needed for Congestion 5/31/19  Yes Mortimer Seat, DO   omeprazole (PRILOSEC) 20 MG delayed release capsule take 1 capsule by mouth once daily 5/31/19  Yes Mortimer Seat, DO   Vitamin D-Vitamin K [facility]) 5500-200 UNIT-MCG TABS take 1 tablet by mouth daily 2/7/19  Yes Historical Provider, MD   fluticasone (FLONASE) 50 MCG/ACT nasal spray 1 spray by Each Nare route daily 10/23/18  Yes Margy Mcginnis MD   acetaminophen (TYLENOL) 325 MG tablet Take 650 mg by mouth every 6 hours as needed. Yes Historical Provider, MD        Allergies:       Vicodin [hydrocodone-acetaminophen]    Review ofSystems:     Positive and Negative as described in HPI    Review of Systems   Constitutional: Negative. Gastrointestinal: Negative. Neurological: Negative. Physical Exam:     Vitals:  /74   Pulse 68   Ht 5' 8\" (1.727 m)   Wt 167 lb (75.8 kg)   SpO2 100%   BMI 25.39 kg/m²   Physical Exam   Constitutional: She is oriented to person, place, and time.  She appears well-developed and well-nourished. No distress. Pulmonary/Chest: Effort normal.   Neurological: She is alert and oriented to person, place, and time. Skin: Skin is warm and dry. Psychiatric: She has a normal mood and affect. Judgment normal.   Tearful at times   Nursing note and vitals reviewed. Data:     Lab Results   Component Value Date     01/15/2019    K 4.3 01/15/2019     01/15/2019    CO2 27 01/15/2019    BUN 12 01/15/2019    CREATININE 0.84 01/15/2019    GLUCOSE 93 01/15/2019    PROT 7.1 11/07/2018    LABALBU 4.0 11/07/2018    BILITOT 0.25 11/07/2018    ALKPHOS 91 11/07/2018    AST 19 11/07/2018    ALT 16 11/07/2018     Lab Results   Component Value Date    WBC 6.9 05/31/2019    RBC 4.37 05/31/2019    HGB 12.7 05/31/2019    HCT 38.0 05/31/2019    MCV 86.9 05/31/2019    MCH 29.1 05/31/2019    MCHC 33.5 05/31/2019    RDW 13.5 05/31/2019     05/31/2019    MPV NOT REPORTED 05/31/2019     Lab Results   Component Value Date    TSH 3.13 05/31/2019     Lab Results   Component Value Date    CHOL 216 11/07/2018    HDL 76 11/07/2018         Assessment & Plan:        Diagnosis Orders   1. Moderate episode of recurrent major depressive disorder [facility])  Referral To Counseling Services   Depression uncontrolled. No SI. Still dealing with issues of guilt related to her previous marriage, some issues with her relationship with her mom also. Advised counseling, which patient was very interested in doing. Continue citalopram and add Wellbutrin. Follow-up 4 to 6 weeks. Requested Prescriptions     Signed Prescriptions Disp Refills    buPROPion (WELLBUTRIN XL) 150 MG extended release tablet 30 tablet 1     Sig: Take 1 tablet by mouth every morning         Patient Instructions     SURVEY:    You may be receiving a survey from [employer] regarding your visit today. Please complete the survey to enable us to provide the highest quality of care to you and your family.     If you cannot score us as very good on

## 2019-08-23 RX ORDER — CELECOXIB 100 MG/1
CAPSULE ORAL
Qty: 60 CAPSULE | Refills: 2 | Status: SHIPPED | OUTPATIENT
Start: 2019-08-23 | End: 2019-11-27 | Stop reason: SDUPTHER

## 2019-10-16 RX ORDER — BUPROPION HYDROCHLORIDE 150 MG/1
150 TABLET ORAL EVERY MORNING
Qty: 90 TABLET | Refills: 1 | Status: SHIPPED | OUTPATIENT
Start: 2019-10-16 | End: 2020-02-25 | Stop reason: SDUPTHER

## 2019-11-27 RX ORDER — CELECOXIB 100 MG/1
CAPSULE ORAL
Qty: 60 CAPSULE | Refills: 2 | Status: SHIPPED | OUTPATIENT
Start: 2019-11-27 | End: 2020-02-25 | Stop reason: CLARIF

## 2019-12-30 RX ORDER — CITALOPRAM 40 MG/1
40 TABLET ORAL DAILY
Qty: 90 TABLET | Refills: 1 | Status: SHIPPED | OUTPATIENT
Start: 2019-12-30 | End: 2020-06-12 | Stop reason: SDUPTHER

## 2020-01-06 ENCOUNTER — OFFICE VISIT (OUTPATIENT)
Dept: FAMILY MEDICINE CLINIC | Age: 50
End: 2020-01-06
Payer: COMMERCIAL

## 2020-01-06 VITALS
WEIGHT: 180 LBS | SYSTOLIC BLOOD PRESSURE: 128 MMHG | HEART RATE: 86 BPM | DIASTOLIC BLOOD PRESSURE: 76 MMHG | OXYGEN SATURATION: 98 % | BODY MASS INDEX: 27.28 KG/M2 | HEIGHT: 68 IN

## 2020-01-06 PROCEDURE — 99214 OFFICE O/P EST MOD 30 MIN: CPT | Performed by: FAMILY MEDICINE

## 2020-01-06 PROCEDURE — G8419 CALC BMI OUT NRM PARAM NOF/U: HCPCS | Performed by: FAMILY MEDICINE

## 2020-01-06 PROCEDURE — G8427 DOCREV CUR MEDS BY ELIG CLIN: HCPCS | Performed by: FAMILY MEDICINE

## 2020-01-06 PROCEDURE — G8484 FLU IMMUNIZE NO ADMIN: HCPCS | Performed by: FAMILY MEDICINE

## 2020-01-06 PROCEDURE — 1036F TOBACCO NON-USER: CPT | Performed by: FAMILY MEDICINE

## 2020-01-06 ASSESSMENT — PATIENT HEALTH QUESTIONNAIRE - PHQ9
SUM OF ALL RESPONSES TO PHQ QUESTIONS 1-9: 0
SUM OF ALL RESPONSES TO PHQ9 QUESTIONS 1 & 2: 0
2. FEELING DOWN, DEPRESSED OR HOPELESS: 0
SUM OF ALL RESPONSES TO PHQ QUESTIONS 1-9: 0
1. LITTLE INTEREST OR PLEASURE IN DOING THINGS: 0

## 2020-01-06 NOTE — PROGRESS NOTES
GASTROINTESTINAL ENDOSCOPY  1998    [place]        Medications:       Prior to Admission medications    Medication Sig Start Date End Date Taking? Authorizing Provider   citalopram (CELEXA) 40 MG tablet take 1 tablet by mouth daily 12/30/19   Euel Plaster, DO   celecoxib (CELEBREX) 100 MG capsule take 1 capsule by mouth twice a day 11/27/19   Euel Plaster, DO   buPROPion (WELLBUTRIN XL) 150 MG extended release tablet take 1 tablet by mouth every morning 10/16/19   Euel Plaster, DO   saline nasal gel (AYR) GEL by Nasal route as needed for Congestion 5/31/19   Euel Plaster, DO   omeprazole (PRILOSEC) 20 MG delayed release capsule take 1 capsule by mouth once daily 5/31/19   Euel Plaster, DO   Vitamin D-Vitamin K [facility]) 5500-200 UNIT-MCG TABS take 1 tablet by mouth daily 2/7/19   Historical Provider, MD   fluticasone (FLONASE) 50 MCG/ACT nasal spray 1 spray by Each Nare route daily 10/23/18   Ximena Gruber MD   acetaminophen (TYLENOL) 325 MG tablet Take 650 mg by mouth every 6 hours as needed. Historical Provider, MD        Allergies:       Vicodin [hydrocodone-acetaminophen]    Social History:     Tobacco:    reports that she quit smoking about 18 years ago. She quit after 6.00 years of use. She has never used smokeless tobacco.  Alcohol:      reports no history of alcohol use. Drug Use:  reports no history of drug use. Family History:     Family History   Problem Relation Age of Onset    Diabetes Mother     Cancer Mother         Liver Cancer    Heart Disease Father        Review of Systems:     Positive and Negative as described in HPI    Review of Systems   Constitutional: Negative. HENT: Negative. Respiratory: Negative. Genitourinary: Negative. Physical Exam:     Vitals:  /76   Pulse 86   Ht 5' 8\" (1.727 m)   Wt 180 lb (81.6 kg)   SpO2 98%   BMI 27.37 kg/m²   Physical Exam  Vitals signs and nursing note reviewed.    Constitutional:       Appearance:  05/31/2019    MPV NOT REPORTED 05/31/2019     Lab Results   Component Value Date    TSH 3.13 05/31/2019     Lab Results   Component Value Date    CHOL 216 11/07/2018    HDL 76 11/07/2018         Assessment & Plan:        Diagnosis Orders   1. Abdominal bloating     2. Moderate episode of recurrent major depressive disorder ([facility] [address]     3. Weight gain     Abdominal bloating and gas pains, frequent flatulence, likely related to poor diet recently. May have some small intestinal bacterial overgrowth. However, with acuity of onset and her admittedly poor diet lately, I think it is best if we act conservatively by improving her diet, cutting out sugar, pop, artificial sweeteners, and increasing intake of water, protein, produce. She does have chronic constipation which contributes. Advised Metamucil 1 tablespoon nightly and water. Advised to call late next week, around January 16, if not improving and would Rx Flagyl. Depression remains uncontrolled. Meds do help. Continue current meds. Ask  staff to contact [facility] regarding patient's referral.  Weight gain, overweight but not obese. She does have venous insufficiency which is exacerbated by her weight. Make significant diet changes as above and try to increase activity levels also. Requested Prescriptions      No prescriptions requested or ordered in this encounter       Patient Instructions   SURVEY:    You may be receiving a survey from [employer] regarding your visit today. You may get this in the mail, through your MyChart or in your email. Please complete the survey to enable us to provide the highest quality of care to you and your family. If you cannot score us as very good ( 5 Stars) on any question, please feel free to call the office to discuss how we could have made your experience exceptional.     Thank you.     Clinical Care Team:  Dr. Bora De La Rosa, DO Reynaldo Fulton,

## 2020-01-06 NOTE — PATIENT INSTRUCTIONS
SURVEY:    You may be receiving a survey from [employer] regarding your visit today. You may get this in the mail, through your MyChart or in your email. Please complete the survey to enable us to provide the highest quality of care to you and your family. If you cannot score us as very good ( 5 Stars) on any question, please feel free to call the office to discuss how we could have made your experience exceptional.     Thank you.     Clinical Care Team:  Dr. Pooja Avila, DO [facility], [address] [place] [address] Team:  [address] [place] [address]

## 2020-01-07 ASSESSMENT — ENCOUNTER SYMPTOMS: RESPIRATORY NEGATIVE: 1

## 2020-02-25 ENCOUNTER — OFFICE VISIT (OUTPATIENT)
Dept: FAMILY MEDICINE CLINIC | Age: 50
End: 2020-02-25
Payer: COMMERCIAL

## 2020-02-25 VITALS
WEIGHT: 176 LBS | BODY MASS INDEX: 26.67 KG/M2 | OXYGEN SATURATION: 100 % | HEART RATE: 73 BPM | HEIGHT: 68 IN | DIASTOLIC BLOOD PRESSURE: 80 MMHG | SYSTOLIC BLOOD PRESSURE: 130 MMHG

## 2020-02-25 PROCEDURE — 99214 OFFICE O/P EST MOD 30 MIN: CPT | Performed by: FAMILY MEDICINE

## 2020-02-25 PROCEDURE — 1036F TOBACCO NON-USER: CPT | Performed by: FAMILY MEDICINE

## 2020-02-25 PROCEDURE — G8484 FLU IMMUNIZE NO ADMIN: HCPCS | Performed by: FAMILY MEDICINE

## 2020-02-25 PROCEDURE — G8419 CALC BMI OUT NRM PARAM NOF/U: HCPCS | Performed by: FAMILY MEDICINE

## 2020-02-25 PROCEDURE — G8427 DOCREV CUR MEDS BY ELIG CLIN: HCPCS | Performed by: FAMILY MEDICINE

## 2020-02-25 RX ORDER — GABAPENTIN 300 MG/1
300 CAPSULE ORAL DAILY
Qty: 30 CAPSULE | Refills: 0 | Status: SHIPPED | OUTPATIENT
Start: 2020-02-25 | End: 2020-06-12 | Stop reason: SDUPTHER

## 2020-02-25 RX ORDER — CELECOXIB 100 MG/1
CAPSULE ORAL
Qty: 60 CAPSULE | Refills: 5 | Status: CANCELLED | OUTPATIENT
Start: 2020-02-25

## 2020-02-25 RX ORDER — BUPROPION HYDROCHLORIDE 150 MG/1
150 TABLET ORAL EVERY MORNING
Qty: 90 TABLET | Refills: 1 | Status: SHIPPED | OUTPATIENT
Start: 2020-02-25 | End: 2020-08-03

## 2020-02-25 RX ORDER — MELOXICAM 15 MG/1
15 TABLET ORAL DAILY PRN
Qty: 30 TABLET | Refills: 5 | Status: SHIPPED | OUTPATIENT
Start: 2020-02-25 | End: 2020-04-13

## 2020-02-25 ASSESSMENT — ENCOUNTER SYMPTOMS: RESPIRATORY NEGATIVE: 1

## 2020-02-25 NOTE — Clinical Note
Please ask  staff to call Dr. Prateek Hines office to see if patient can still schedule appointment with them or if she needs a referral, and also call patient and tell her that I reviewed the ingredients of the product she is taking. I do not believe it is unsafe for her, but since it has not been helping she may as well stop using it.

## 2020-02-25 NOTE — PATIENT INSTRUCTIONS
SURVEY:    You may be receiving a survey from [employer] regarding your visit today. You may get this in the mail, through your MyChart or in your email. Please complete the survey to enable us to provide the highest quality of care to you and your family. If you cannot score us as very good ( 5 Stars) on any question, please feel free to call the office to discuss how we could have made your experience exceptional.     Thank you.     Clinical Care Team:  Dr. Diogenes Ricks, [facility], [address] [place] [address] Team:  [address] [place] [address]

## 2020-02-25 NOTE — PROGRESS NOTES
Name: Cathy Jordan  : 1970         Chief Complaint:     Chief Complaint   Patient presents with    Gastroesophageal Reflux     pharmacist stopped her omeprazole and wouldn't give it to her because it might cause kidney problems. (per patient)    Back Pain     hurts around tailbone and her legs hurt    Obesity     wants adipex       History of Present Illness:      Cathy Jordan is a 54 y.o.  female who presents with Gastroesophageal Reflux (pharmacist stopped her omeprazole and wouldn't give it to her because it might cause kidney problems. (per patient)); Back Pain (hurts around tailbone and her legs hurt); and Obesity (wants adipex)      HPI     Stomach trouble which is now a lot better since she restarted some medication and also stopped eating and drinking sugar-free products. Had been bloated and gassy and having heartburn with sour regurgitation. No diet pepsi or sugar-free candy in past 2 wks. On omeprazole which is helping a lot. In the past had been told by pharmacist that she shouldn't be on omeprazole d/t potential for causing kidney problems. Having back pain in lower back for the past week, pain in sacrum with standing from sitting, pain shooting into osvaldo medial thighs and knees. Using otc pain patches on sacrum, using tylenol and ibuprofen. In July had lumbar MRI d/t LLE weakness, L foot drop, was abnl and was referred to [facility] but couldn't go d/t work schedule. She is very bothered by this pain. It interferes with her sleep. Severe pain in both feet and wearing shoes seems to cause worse pain, so she has actually started having to buy shoes that are way too big for her because they do not make her feet hurt as much. C/o poor energy and weight gain, had been helped a lot by adipex and she wishes she could get back on it. Jennifer Shepherd Poor energy levels, just lays around after work and hasn't been to the gym in months. Works 11PM-6AM, goes home and sleeps til about 10.  Other times she works is more predictable now, so she should be able to schedule an afternoon appointment if available. Pain interfering with patient's sleep, and I believe that this is contributing significantly to her fatigue. Gabapentin prescribed to help with the pain. Patient essentially requested to go back on Adipex to help with fatigue. Advised that I cannot use this medication off label and that she does not qualify for the medication as she is not obese. She is taking a supplement, called after the appointment and left the name, phenaprin. Search online shows this contains anhydrous caffeine, l-carnitine, DHEA, L taurine, and L-phenalanine. This has not helped patient, so we will call and advised that she stopped taking it. I did advise that she try to get 6 to 8 hours of sleep per day rather than the 4 hours that she has been getting, set a plan for the day, do exercise shortly after waking up. Continue current medications. We did have to change Celebrex to meloxicam due to insurance coverage. GERD controlled since restarting PPI. Continue the same. Continue diet modification, avoiding artificial sweeteners, which she had been using in excess. Requested Prescriptions     Signed Prescriptions Disp Refills    buPROPion (WELLBUTRIN XL) 150 MG extended release tablet 90 tablet 1     Sig: Take 1 tablet by mouth every morning    gabapentin (NEURONTIN) 300 MG capsule 30 capsule 0     Sig: Take 1 capsule by mouth daily for 30 days. Intended supply: 30 days    meloxicam (MOBIC) 15 MG tablet 30 tablet 5     Sig: Take 1 tablet by mouth daily as needed for Pain       Patient Instructions   SURVEY:    You may be receiving a survey from [employer] regarding your visit today. You may get this in the mail, through your MyChart or in your email. Please complete the survey to enable us to provide the highest quality of care to you and your family.     If you cannot score us as very good ( 5 Stars) on any question,

## 2020-02-26 ENCOUNTER — TELEPHONE (OUTPATIENT)
Dept: FAMILY MEDICINE CLINIC | Age: 50
End: 2020-02-26

## 2020-02-26 NOTE — TELEPHONE ENCOUNTER
Signed. Please make sure that her MRI report from July gets faxed and also make sure that patient gets a disc of the images to take to the appointment with her. This can be obtained from medical records.

## 2020-03-20 ENCOUNTER — APPOINTMENT (OUTPATIENT)
Dept: BONE DENSITY | Age: 50
End: 2020-03-20
Payer: COMMERCIAL

## 2020-03-20 ENCOUNTER — APPOINTMENT (OUTPATIENT)
Dept: MAMMOGRAPHY | Age: 50
End: 2020-03-20
Payer: COMMERCIAL

## 2020-03-20 ENCOUNTER — HOSPITAL ENCOUNTER (OUTPATIENT)
Dept: ULTRASOUND IMAGING | Age: 50
Discharge: HOME | End: 2020-03-22
Payer: COMMERCIAL

## 2020-03-20 PROCEDURE — 76856 US EXAM PELVIC COMPLETE: CPT

## 2020-03-20 PROCEDURE — 76830 TRANSVAGINAL US NON-OB: CPT

## 2020-03-30 ENCOUNTER — OFFICE VISIT (OUTPATIENT)
Dept: PRIMARY CARE CLINIC | Age: 50
End: 2020-03-30
Payer: COMMERCIAL

## 2020-03-30 VITALS
DIASTOLIC BLOOD PRESSURE: 86 MMHG | HEART RATE: 60 BPM | WEIGHT: 179 LBS | BODY MASS INDEX: 27.22 KG/M2 | TEMPERATURE: 98.4 F | SYSTOLIC BLOOD PRESSURE: 120 MMHG | OXYGEN SATURATION: 100 %

## 2020-03-30 LAB
INFLUENZA A ANTIBODY: NORMAL
INFLUENZA B ANTIBODY: NORMAL

## 2020-03-30 PROCEDURE — 87804 INFLUENZA ASSAY W/OPTIC: CPT | Performed by: NURSE PRACTITIONER

## 2020-03-30 PROCEDURE — 99213 OFFICE O/P EST LOW 20 MIN: CPT | Performed by: NURSE PRACTITIONER

## 2020-03-30 PROCEDURE — G8419 CALC BMI OUT NRM PARAM NOF/U: HCPCS | Performed by: NURSE PRACTITIONER

## 2020-03-30 PROCEDURE — G8484 FLU IMMUNIZE NO ADMIN: HCPCS | Performed by: NURSE PRACTITIONER

## 2020-03-30 PROCEDURE — 1036F TOBACCO NON-USER: CPT | Performed by: NURSE PRACTITIONER

## 2020-03-30 PROCEDURE — G8427 DOCREV CUR MEDS BY ELIG CLIN: HCPCS | Performed by: NURSE PRACTITIONER

## 2020-03-30 RX ORDER — DEXTROMETHORPHAN HYDROBROMIDE, GUAIFENESIN AND PSEUDOEPHEDRINE HYDROCHLORIDE 15; 400; 60 MG/1; MG/1; MG/1
1 TABLET ORAL EVERY 6 HOURS PRN
Qty: 28 TABLET | Refills: 0 | Status: SHIPPED | OUTPATIENT
Start: 2020-03-30 | End: 2020-04-06

## 2020-03-30 RX ORDER — FLUTICASONE PROPIONATE 50 MCG
1 SPRAY, SUSPENSION (ML) NASAL DAILY
Qty: 1 BOTTLE | Refills: 0 | Status: ON HOLD | OUTPATIENT
Start: 2020-03-30 | End: 2021-05-07 | Stop reason: ALTCHOICE

## 2020-03-30 ASSESSMENT — ENCOUNTER SYMPTOMS
SHORTNESS OF BREATH: 0
COUGH: 1
WHEEZING: 0

## 2020-03-30 NOTE — PROGRESS NOTES
[facility] [id number]  Dept: [phone]  Dept Fax: [phone]    Shaniqua Ying a 54 y.o. female who presents to the [facility] in Care today for hermedical conditions/complaints as noted below. Lenora Love is c/o of Cough (Patient here today with a dry cough that started this morning.); Headache (Patient complains of headache that started yesterday.); and Nasal Congestion (Patient complains of runny nose, said her nasal passages feel swollen to her. )      HPI:   Cough   This is a new problem. The current episode started yesterday (Cough \"just started this morning when I woke up. Some shortness of breath but I think it's from my nose all stuffed up. I used my aftrin this morning. Headache that I've been using Tylenol for. \"). The problem has been unchanged. The problem occurs constantly. The cough is non-productive. Associated symptoms include headaches and nasal congestion. Pertinent negatives include no chills, fever, shortness of breath or wheezing. She has tried rest (Afrin nasal srpay and Tylenol) for the symptoms. The treatment provided moderate relief. Her past medical history is significant for asthma. No high risk travel; does have contact with people from [place] [address] daily due to [employer] at her place of employeement ([place] at [employer]) none of the people she feels were ill. No exposure to confirmed covid 19 or PUI. Past Medical History:   Diagnosis Date    Anxiety     Depression     Ulcer     stomach       Current Outpatient Medications   Medication Sig Dispense Refill    fluticasone (FLONASE) 50 MCG/ACT nasal spray 1 spray by Nasal route daily for 7 days Dispose of after course is completed. 1 Bottle 0    Pseudoephedrine-DM-GG (CAPMIST DM) 60- MG TABS Take 1 capsule by mouth every 6 hours as needed (sinus congestion and cough) Do not exceed4 capsules per day.  28 tablet 0    buPROPion (WELLBUTRIN XL) 150 MG extended release tablet Take 1 tablet by mouth every morning 90 tablet 1    citalopram (CELEXA) 40 MG tablet take 1 tablet by mouth daily 90 tablet 1    saline nasal gel (AYR) GEL by Nasal route as needed for Congestion 1 Tube 3    Vitamin D-Vitamin K (DOSOQUIN) 5500-200 UNIT-MCG TABS take 1 tablet by mouth daily  1    acetaminophen (TYLENOL) 325 MG tablet Take 650 mg by mouth every 6 hours as needed.  gabapentin (NEURONTIN) 300 MG capsule Take 1 capsule by mouth daily for 30 days. Intended supply: 30 days 30 capsule 0    meloxicam (MOBIC) 15 MG tablet Take 1 tablet by mouth daily as needed for Pain (Patient not taking: Reported on 3/30/2020) 30 tablet 5     No current facility-administered medications for this visit. Allergies   Allergen Reactions    Vicodin [Hydrocodone-Acetaminophen] Nausea And Vomiting       Subjective:     Review of Systems   Constitutional: Negative for chills and fever. Respiratory: Negative for shortness of breath and wheezing. Neurological: Positive for headaches. Objective:   Physical Exam  Vitals signs and nursing note reviewed. Constitutional:       Comments: Appears to be of stated age with warm, dry skin; normal coloration without rash of the exposed skin. Patient is well-appearing, well-hydrated, and appears nontoxic without apparent distress. HENT:      Head: Normocephalic. Right Ear: Tympanic membrane normal.      Left Ear: Tympanic membrane normal.      Nose: Congestion present. No rhinorrhea. Right Turbinates: Swollen. Left Turbinates: Swollen. Mouth/Throat:      Lips: Pink. Mouth: Mucous membranes are moist.      Pharynx: Oropharynx is clear. Uvula midline. No oropharyngeal exudate. Cardiovascular:      Rate and Rhythm: Normal rate and regular rhythm. Pulmonary:      Effort: Pulmonary effort is normal.      Breath sounds: Normal breath sounds. No wheezing, rhonchi or rales.    Chest:      Chest wall: No tenderness. Lymphadenopathy:      Cervical: No cervical adenopathy. /86   Pulse 60   Temp 98.4 °F (36.9 °C) (Oral)   Wt 179 lb (81.2 kg)   LMP 10/03/2017 (Exact Date)   SpO2 100%   BMI 27.22 kg/m²   No results found for this visit on 03/30/20. Assessment:      Diagnosis Orders   1. Viral URI with cough  fluticasone (FLONASE) 50 MCG/ACT nasal spray    Pseudoephedrine-DM-GG (CAPMIST DM) 60- MG TABS   2. Cough  POCT Influenza A/B       Plan:   Buster Brambila is a 54 y.o. female presents to clinic with concern for cough. Reviewed and discussed HPI, exam findings including POCT results. Buster Brambila appears nontoxic, well hydrated and well appearing; lung sounds are clear. No findings of bacterial illness and have recommended home based supportive management and strict follow up for any worsening or concerns. 1. Viral URI with cough  - fluticasone (FLONASE) 50 MCG/ACT nasal spray; 1 spray by Nasal route daily for 7 days Dispose of after course is completed. Dispense: 1 Bottle; Refill: 0. Administration and side effects reviewed. - Pseudoephedrine-DM-GG (CAPMIST DM) 60- MG TABS; Take 1 capsule by mouth every 6 hours as needed (sinus congestion and cough) Do not exceed 4 capsules per day. Dispense: 28 tablet; Refill: 0. Administration and side effects reviewed. - Discussed supportive management including good oral hydration, rest and Tylenol for fever and body aches as OTC packaging labelled. - Discussed strict follow-up precautions for any worsening and/or concerns.  - Return to work excuse provided. 2. Cough  - POCT Influenza A/B    Return for ED for worsening symptoms.         Electronically signed by DEANDRA Wang CNP on 3/30/2020 at 9:04 AM

## 2020-03-30 NOTE — PATIENT INSTRUCTIONS
SURVEY:    You may be receiving a survey from [employer] regarding your visit today. Please complete the survey to enable us to provide the highest quality of care to you and your family. If you cannot score us a very good on any question, please call the office to discuss how we could have made your experience a very good one. Thank you. Patient Education        Viral Respiratory Infection: Care Instructions  Your Care Instructions    Viruses are very small organisms. They grow in number after they enter your body. There are many types that cause different illnesses, such as colds and the mumps. The symptoms of a viral respiratory infection often start quickly. They include a fever, sore throat, and runny nose. You may also just not feel well. Or you may not want to eat much. Most viral respiratory infections are not serious. They usually get better with time and self-care. Antibiotics are not used to treat a viral infection. That's because antibiotics will not help cure a viral illness. In some cases, antiviral medicine can help your body fight a serious viral infection. Follow-up care is a key part of your treatment and safety. Be sure to make and go to all appointments, and call your doctor if you are having problems. It's also a good idea to know your test results and keep a list of the medicines you take. How can you care for yourself at home? · Rest as much as possible until you feel better. · Be safe with medicines. Take your medicine exactly as prescribed. Call your doctor if you think you are having a problem with your medicine. You will get more details on the specific medicine your doctor prescribes. · Take an over-the-counter pain medicine, such as acetaminophen (Tylenol), ibuprofen (Advil, Motrin), or naproxen (Aleve), as needed for pain and fever. Read and follow all instructions on the label. Do not give aspirin to anyone younger than 20.  It has been linked to Reye syndrome, prescription and over-the-counter medicines, vitamins, and herbal products. Not all possible drug interactions are listed here. Where can I get more information? Your pharmacist can provide more information about fluticasone nasal.  Remember, keep this and all other medicines out of the reach of children, never share your medicines with others, and use this medication only for the indication prescribed. Every effort has been made to ensure that the information provided by [facility] is accurate, up-to-date, and complete, but no guarantee is made to that effect. Drug information contained herein may be time sensitive. [facility] information has been compiled for use by healthcare practitioners and consumers in the [facility] and therefore [facility] does not warrant that uses outside of the [facility] are appropriate, unless specifically indicated otherwise. [facility]'s drug information does not endorse drugs, diagnose patients or recommend therapy. [facility] drug information is an informational resource designed to assist licensed healthcare practitioners in caring for their patients and/or to serve consumers viewing this service as a supplement to, and not a substitute for, the expertise, skill, knowledge and judgment of healthcare practitioners. The absence of a warning for a given drug or drug combination in no way should be construed to indicate that the drug or drug combination is safe, effective or appropriate for any given patient. [facility] does not assume any responsibility for any aspect of healthcare administered with the aid of information [facility] provides. The information contained herein is not intended to cover all possible uses, directions, precautions, warnings, drug interactions, allergic reactions, or adverse effects. If you have questions about the drugs you are taking, check with your doctor, nurse or pharmacist.  Copyright 7606-8363 [address]. Version: 10.01.  Revision know about guaifenesin? Ask a doctor or pharmacist before using this medicine if you have health problems or use other medications, or if you are pregnant or breast-feeding. What is guaifenesin? Guaifenesin is used to reduce chest congestion caused by the common cold, flu, or chronic bronchitis. Guaifenesin helps loosen congestion in your chest and throat, making it easier to cough out through your mouth. There are many brands and forms of guaifenesin available. Not all brands are listed on this leaflet. Guaifenesin may also be used for purposes not listed in this medication guide. What should I discuss with my healthcare provider before taking guaifenesin? You should not use guaifenesin if you are allergic to it. Ask a doctor or pharmacist if it is safe for you to use this medicine if you have other medical conditions. Ask a doctor before using this medicine if you are pregnant. You should not breast-feed while using guaifenesin. How should I take guaifenesin? Use exactly as directed on the label, or as prescribed by your doctor. Cold or cough medicine is only for short-term use until your symptoms clear up. Always follow directions on the medicine label about giving cough or cold medicine to a child. Do not use the medicine only to make a child sleepy. Death can occur from the misuse of cough or cold medicines in very young children. Measure liquid medicine carefully. Use the dosing syringe provided, or use a medicine dose-measuring device (not a kitchen spoon). To use guaifenesin granules, pour out the entire packet onto your tongue and swallow without chewing. Call your doctor if your symptoms do not improve after 7 days, or if you have a fever, rash, or headaches. This medicine can affect the results of certain medical tests. Tell any doctor who treats you that you are using guaifenesin. Store at room temperature away from moisture and heat. Do not freeze.   What happens if I miss a dose?  Since cough or cold medicine is used when needed, you may not be on a dosing schedule. Skip any missed dose if it's almost time for your next dose. Do not use two doses at one time. What happens if I overdose? Seek emergency medical attention or call the Poison Help line at 1-407.809.1776. What should I avoid while taking guaifenesin? Ask a doctor or pharmacist before using other cough or cold medicines that may contain similar ingredients. Avoid driving or hazardous activity until you know how this medicine will affect you. Your reactions could be impaired. What are the possible side effects of guaifenesin? Get emergency medical help if you have signs of an allergic reaction: hives; difficult breathing; swelling of your face, lips, tongue, or throat. Common side effects may include:  · nausea; or  · vomiting. This is not a complete list of side effects and others may occur. Call your doctor for medical advice about side effects. You may report side effects to FDA at 7-713-VJX-4251. What other drugs will affect guaifenesin ? Avoid using this medicine with other drugs that cause drowsiness or slow your breathing (such as opioid medicine, a muscle relaxer, or medicine for anxiety or seizures). Ask a doctor or pharmacist before using any other medication, including prescription and over-the-counter medicines, vitamins, and herbal products. Not all possible drug interactions are listed in this medication guide. Where can I get more information? Your pharmacist can provide more information about guaifenesin. Remember, keep this and all other medicines out of the reach of children, never share your medicines with others, and use this medication only for the indication prescribed. Every effort has been made to ensure that the information provided by [facility] [address] is accurate, up-to-date, and complete, but no guarantee is made to that effect.  Drug information contained herein may be

## 2020-03-30 NOTE — PROGRESS NOTES
Edgar Carrasquillo  1970    Chief Complaint   Patient presents with    Cough    Headache       Respiratory Symptoms:  Patient complains of 1 day(s) history of --- CONSTITUTIONAL SYMPTOMS ---, myalgias/arthralgias, headache, --- EAR SYMPTOMS ---, ear congestion: bilaterally, --- NOSE & THROAT SYMPTOMS ---, nasal congestion, facial pain/sinus pressure: bilateral frontal, --- RESPIRATORY SYMPTOMS ---, shortness of breath and non-productive cough. Symptoms have been worsening with time. She denies any other symptoms . Relevant PMH: No pertinent PMH. Smoking history:  She  reports that she quit smoking about 18 years ago. She quit after 6.00 years of use. She has never used smokeless tobacco.     She has had no known ill contacts. 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 Treatment to date: saline spray/neti pot , Acetaminophen. Travel screen completed:   Yes

## 2020-04-01 ENCOUNTER — TELEMEDICINE (OUTPATIENT)
Dept: FAMILY MEDICINE CLINIC | Age: 50
End: 2020-04-01
Payer: COMMERCIAL

## 2020-04-01 PROCEDURE — 99213 OFFICE O/P EST LOW 20 MIN: CPT | Performed by: FAMILY MEDICINE

## 2020-04-01 PROCEDURE — G8427 DOCREV CUR MEDS BY ELIG CLIN: HCPCS | Performed by: FAMILY MEDICINE

## 2020-04-01 RX ORDER — ALPRAZOLAM 0.5 MG/1
0.5 TABLET ORAL 3 TIMES DAILY PRN
Qty: 60 TABLET | Refills: 0 | Status: SHIPPED | OUTPATIENT
Start: 2020-04-01 | End: 2021-06-08 | Stop reason: SDUPTHER

## 2020-04-01 ASSESSMENT — PATIENT HEALTH QUESTIONNAIRE - PHQ9
SUM OF ALL RESPONSES TO PHQ QUESTIONS 1-9: 0
1. LITTLE INTEREST OR PLEASURE IN DOING THINGS: 0
2. FEELING DOWN, DEPRESSED OR HOPELESS: 0
SUM OF ALL RESPONSES TO PHQ9 QUESTIONS 1 & 2: 0
SUM OF ALL RESPONSES TO PHQ QUESTIONS 1-9: 0

## 2020-04-01 ASSESSMENT — ENCOUNTER SYMPTOMS: GASTROINTESTINAL NEGATIVE: 1

## 2020-04-01 NOTE — PROGRESS NOTES
Take 1 tablet by mouth 3 times daily as needed for Sleep for up to 30 days. 4/1/20 5/1/20 Yes Lindsey Lucero, DO   fluticasone (FLONASE) 50 MCG/ACT nasal spray 1 spray by Nasal route daily for 7 days Dispose of after course is completed. 3/30/20 4/6/20 Yes DEANDRA Ramirez - CNP   Pseudoephedrine-DM-GG (CAPMIST DM) 60- MG TABS Take 1 capsule by mouth every 6 hours as needed (sinus congestion and cough) Do not exceed4 capsules per day. 3/30/20 4/6/20 Yes DEANDRA Ramirez - CNP   buPROPion (WELLBUTRIN XL) 150 MG extended release tablet Take 1 tablet by mouth every morning 2/25/20  Yes Clinton Arellano, DO   gabapentin (NEURONTIN) 300 MG capsule Take 1 capsule by mouth daily for 30 days. Intended supply: 30 days 2/25/20 4/1/20 Yes Clinton Arellano, DO   meloxicam (MOBIC) 15 MG tablet Take 1 tablet by mouth daily as needed for Pain 2/25/20  Yes Rozetta Eileen, DO   citalopram (CELEXA) 40 MG tablet take 1 tablet by mouth daily 12/30/19  Yes Clinton Arellano, DO   saline nasal gel (AYR) GEL by Nasal route as needed for Congestion 5/31/19  Yes Clinton Arellano, DO   Vitamin D-Vitamin K [facility]) 5500-200 UNIT-MCG TABS take 1 tablet by mouth daily 2/7/19  Yes Historical Provider, MD   acetaminophen (TYLENOL) 325 MG tablet Take 650 mg by mouth every 6 hours as needed. Yes Historical Provider, MD        Allergies:       Vicodin [hydrocodone-acetaminophen]    Social History:     Tobacco:    reports that she quit smoking about 18 years ago. She quit after 6.00 years of use. She has never used smokeless tobacco.  Alcohol:      reports no history of alcohol use. Drug Use:  reports no history of drug use. Family History:     Family History   Problem Relation Age of Onset    Diabetes Mother     Cancer Mother         Liver Cancer    Heart Disease Father        Review of Systems:     Positive and Negative as described in HPI    Review of Systems   Constitutional: Negative.     Cardiovascular:

## 2020-04-01 NOTE — PATIENT INSTRUCTIONS
SURVEY:    You may be receiving a survey from [employer] regarding your visit today. You may get this in the mail, through your MyChart or in your email. Please complete the survey to enable us to provide the highest quality of care to you and your family. If you cannot score us as very good ( 5 Stars) on any question, please feel free to call the office to discuss how we could have made your experience exceptional.     Thank you.     Clinical Care Team:  Dr. Nishant Maynard, DO Sonia Rashid, [address] [place] [address] Team:  [address] [place] [address]

## 2020-04-13 ENCOUNTER — OFFICE VISIT (OUTPATIENT)
Dept: FAMILY MEDICINE CLINIC | Age: 50
End: 2020-04-13
Payer: COMMERCIAL

## 2020-04-13 VITALS
BODY MASS INDEX: 28.64 KG/M2 | WEIGHT: 189 LBS | HEART RATE: 81 BPM | HEIGHT: 68 IN | SYSTOLIC BLOOD PRESSURE: 128 MMHG | DIASTOLIC BLOOD PRESSURE: 72 MMHG | OXYGEN SATURATION: 99 %

## 2020-04-13 PROCEDURE — 99214 OFFICE O/P EST MOD 30 MIN: CPT | Performed by: FAMILY MEDICINE

## 2020-04-13 PROCEDURE — G8427 DOCREV CUR MEDS BY ELIG CLIN: HCPCS | Performed by: FAMILY MEDICINE

## 2020-04-13 PROCEDURE — G8419 CALC BMI OUT NRM PARAM NOF/U: HCPCS | Performed by: FAMILY MEDICINE

## 2020-04-13 PROCEDURE — 1036F TOBACCO NON-USER: CPT | Performed by: FAMILY MEDICINE

## 2020-04-13 RX ORDER — PHENTERMINE HYDROCHLORIDE 37.5 MG/1
37.5 TABLET ORAL
Qty: 30 TABLET | Refills: 0 | Status: SHIPPED | OUTPATIENT
Start: 2020-04-13 | End: 2020-05-13 | Stop reason: SDUPTHER

## 2020-04-13 NOTE — PROGRESS NOTES
morning (before breakfast) for 30 days. There are no Patient Instructions on file for this visit. Racquel Mcclure received counseling on the following healthy behaviors: medication adherence  Reviewed prior labs and health maintenance. Continue current medications, diet and exercise. Discussed use, benefit, and side effects of prescribed medications. Barriers to medication compliance addressed. Patient given educational materials - see patient instructions. All patient questions answered. Patient voiced understanding.      Electronically signed by Aiden Hancock DO on 4/14/2020 at 11:52 AM   [address]  Dept: [phone]

## 2020-04-14 ASSESSMENT — ENCOUNTER SYMPTOMS
GASTROINTESTINAL NEGATIVE: 1
RESPIRATORY NEGATIVE: 1

## 2020-05-13 ENCOUNTER — OFFICE VISIT (OUTPATIENT)
Dept: FAMILY MEDICINE CLINIC | Age: 50
End: 2020-05-13
Payer: COMMERCIAL

## 2020-05-13 VITALS
HEIGHT: 68 IN | HEART RATE: 91 BPM | BODY MASS INDEX: 26.83 KG/M2 | DIASTOLIC BLOOD PRESSURE: 70 MMHG | WEIGHT: 177 LBS | SYSTOLIC BLOOD PRESSURE: 130 MMHG | OXYGEN SATURATION: 99 %

## 2020-05-13 PROCEDURE — 1036F TOBACCO NON-USER: CPT | Performed by: FAMILY MEDICINE

## 2020-05-13 PROCEDURE — 99213 OFFICE O/P EST LOW 20 MIN: CPT | Performed by: FAMILY MEDICINE

## 2020-05-13 PROCEDURE — G8427 DOCREV CUR MEDS BY ELIG CLIN: HCPCS | Performed by: FAMILY MEDICINE

## 2020-05-13 PROCEDURE — G8419 CALC BMI OUT NRM PARAM NOF/U: HCPCS | Performed by: FAMILY MEDICINE

## 2020-05-13 RX ORDER — PHENTERMINE HYDROCHLORIDE 37.5 MG/1
37.5 TABLET ORAL
Qty: 30 TABLET | Refills: 0 | Status: SHIPPED | OUTPATIENT
Start: 2020-05-13 | End: 2020-06-12 | Stop reason: SDUPTHER

## 2020-05-13 ASSESSMENT — ENCOUNTER SYMPTOMS
GASTROINTESTINAL NEGATIVE: 1
RESPIRATORY NEGATIVE: 1

## 2020-05-13 NOTE — PROGRESS NOTES
Name: Caren Puckett  : 1970         Chief Complaint:     Chief Complaint   Patient presents with    Obesity    Animal Bite     cat scratched her face 3 weeks ago. healing. History of Present Illness:      Caren Puckett is a 54 y.o.  female who presents with Obesity and Animal Bite (cat scratched her face 3 weeks ago. healing. )      HPI     Follow-up obesity. Doing well, happy with 12 pound weight loss in the past month. Has cut back on junk food and eating more fruit. Bowels doing better with that also. On adipex daily without adverse effect except mild dry mouth. Has still been unable to go to the gym due to closure of her COVID-19. About 3 wks ago cat was climbing on headboard during the night and fell onto pt's face. 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 Bled     Pain in osvaldo anterior and lateral hips with walking up stairs. Will see NS after COVID restrictions have been lifted. 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 Will have repeat in January. DEXA was ordered. Had pelvic US which was negative. Abdomen feeling better now with diet changes. Past Medical History:     Past Medical History:   Diagnosis Date    Anxiety     Depression     Ulcer     stomach        Past Surgical History:     Past Surgical History:   Procedure Laterality Date     SECTION      CHOLECYSTECTOMY, LAPAROSCOPIC N/A 2017    CHOLECYSTECTOMY LAPAROSCOPIC; photos performed by Tiffany Tyler MD at [facility]        Medications:       Prior to Admission medications    Medication Sig Start Date End Date Taking? Authorizing Provider   phentermine (ADIPEX-P) 37.5 MG tablet Take 1 tablet by mouth every morning (before breakfast) for 30 days.  20 Yes Lindsey Lucero,    FOLIC ACID PO Take 903 mg by mouth daily   Yes Historical Provider, MD   buPROPion (WELLBUTRIN XL) 150 MG extended release tablet Take 1 screening DEXA. She is only 2-1/2 years post LMP and has no other risk factors for fractures. Requested Prescriptions     Signed Prescriptions Disp Refills    phentermine (ADIPEX-P) 37.5 MG tablet 30 tablet 0     Sig: Take 1 tablet by mouth every morning (before breakfast) for 30 days. Patient Instructions   SURVEY:    You may be receiving a survey from [employer] regarding your visit today. You may get this in the mail, through your MyChart or in your email. Please complete the survey to enable us to provide the highest quality of care to you and your family. If you cannot score us as very good ( 5 Stars) on any question, please feel free to call the office to discuss how we could have made your experience exceptional.     Thank you. Clinical Care Team:  Dr. Lizbeth Fisher DO                                           [facility], [address] [place] [address] Team:                              Celia Oleary Free received counseling on the following healthy behaviors: medication adherence  Reviewed prior labs and health maintenance. Continue current medications, diet and exercise. Discussed use, benefit, and side effects of prescribed medications. Barriers to medication compliance addressed. Patient given educational materials - see patient instructions. All patient questions answered. Patient voiced understanding.      Electronically signed by Lizbeth Fisher DO on 5/13/2020 at [address]  Dept: [phone]

## 2020-05-13 NOTE — PATIENT INSTRUCTIONS
SURVEY:    You may be receiving a survey from [employer] regarding your visit today. You may get this in the mail, through your MyChart or in your email. Please complete the survey to enable us to provide the highest quality of care to you and your family. If you cannot score us as very good ( 5 Stars) on any question, please feel free to call the office to discuss how we could have made your experience exceptional.     Thank you.     Clinical Care Team:  DO Sonia Huggins, [facility] Team:                              Frandy Jurado

## 2020-06-12 ENCOUNTER — OFFICE VISIT (OUTPATIENT)
Dept: FAMILY MEDICINE CLINIC | Age: 50
End: 2020-06-12
Payer: COMMERCIAL

## 2020-06-12 VITALS
SYSTOLIC BLOOD PRESSURE: 126 MMHG | WEIGHT: 175 LBS | HEIGHT: 68 IN | BODY MASS INDEX: 26.52 KG/M2 | DIASTOLIC BLOOD PRESSURE: 78 MMHG

## 2020-06-12 PROCEDURE — G8419 CALC BMI OUT NRM PARAM NOF/U: HCPCS | Performed by: FAMILY MEDICINE

## 2020-06-12 PROCEDURE — G8427 DOCREV CUR MEDS BY ELIG CLIN: HCPCS | Performed by: FAMILY MEDICINE

## 2020-06-12 PROCEDURE — 99213 OFFICE O/P EST LOW 20 MIN: CPT | Performed by: FAMILY MEDICINE

## 2020-06-12 PROCEDURE — 1036F TOBACCO NON-USER: CPT | Performed by: FAMILY MEDICINE

## 2020-06-12 RX ORDER — CITALOPRAM 40 MG/1
40 TABLET ORAL DAILY
Qty: 90 TABLET | Refills: 1 | Status: SHIPPED | OUTPATIENT
Start: 2020-06-12 | End: 2020-09-04

## 2020-06-12 RX ORDER — PHENTERMINE HYDROCHLORIDE 37.5 MG/1
37.5 TABLET ORAL
Qty: 30 TABLET | Refills: 0 | Status: SHIPPED | OUTPATIENT
Start: 2020-06-12 | End: 2020-07-12

## 2020-06-12 RX ORDER — GABAPENTIN 300 MG/1
300 CAPSULE ORAL DAILY
Qty: 30 CAPSULE | Refills: 2 | Status: SHIPPED | OUTPATIENT
Start: 2020-06-12 | End: 2020-10-08 | Stop reason: SDUPTHER

## 2020-06-12 ASSESSMENT — ENCOUNTER SYMPTOMS
GASTROINTESTINAL NEGATIVE: 1
RESPIRATORY NEGATIVE: 1

## 2020-06-12 NOTE — PROGRESS NOTES
Elisánchez Pratt, APRN - CNP        Allergies:       Vicodin [hydrocodone-acetaminophen]    Review ofSystems:     Positive and Negative as described in HPI    Review of Systems   Constitutional: Negative. Respiratory: Negative. Cardiovascular: Negative. Gastrointestinal: Negative. Physical Exam:     Vitals:  /78   Ht 5' 8\" (1.727 m)   Wt 175 lb (79.4 kg)   LMP 10/03/2017 (Exact Date)   BMI 26.61 kg/m²   Physical Exam  Vitals signs and nursing note reviewed. Constitutional:       General: She is not in acute distress. Appearance: She is well-developed. HENT:      Head: Normocephalic and atraumatic. Eyes:      Conjunctiva/sclera: Conjunctivae normal.   Cardiovascular:      Rate and Rhythm: Normal rate and regular rhythm. Heart sounds: Normal heart sounds. Comments: No peripheral edema. Pulmonary:      Effort: Pulmonary effort is normal.      Breath sounds: Normal breath sounds. Skin:     General: Skin is warm and dry. Neurological:      Mental Status: She is alert and oriented to person, place, and time.    Psychiatric:         Mood and Affect: Mood normal.         Behavior: Behavior normal.         Judgment: Judgment normal.         Data:     Lab Results   Component Value Date     01/15/2019    K 4.3 01/15/2019     01/15/2019    CO2 27 01/15/2019    BUN 12 01/15/2019    CREATININE 0.84 01/15/2019    GLUCOSE 93 01/15/2019    PROT 7.1 11/07/2018    LABALBU 4.0 11/07/2018    BILITOT 0.25 11/07/2018    ALKPHOS 91 11/07/2018    AST 19 11/07/2018    ALT 16 11/07/2018     Lab Results   Component Value Date    WBC 6.9 05/31/2019    RBC 4.37 05/31/2019    HGB 12.7 05/31/2019    HCT 38.0 05/31/2019    MCV 86.9 05/31/2019    MCH 29.1 05/31/2019    MCHC 33.5 05/31/2019    RDW 13.5 05/31/2019     05/31/2019    MPV NOT REPORTED 05/31/2019     Lab Results   Component Value Date    TSH 3.13 05/31/2019     Lab Results   Component Value Date    CHOL 216 11/07/2018

## 2020-06-15 ENCOUNTER — HOSPITAL ENCOUNTER (OUTPATIENT)
Dept: MAMMOGRAPHY | Age: 50
Discharge: HOME | End: 2020-06-17
Payer: COMMERCIAL

## 2020-06-15 PROCEDURE — 77067 SCR MAMMO BI INCL CAD: CPT

## 2020-08-21 ENCOUNTER — HOSPITAL ENCOUNTER (OUTPATIENT)
Dept: VASCULAR LAB | Age: 50
Discharge: HOME | End: 2020-08-23
Payer: COMMERCIAL

## 2020-08-21 ENCOUNTER — OFFICE VISIT (OUTPATIENT)
Dept: FAMILY MEDICINE CLINIC | Age: 50
End: 2020-08-21
Payer: COMMERCIAL

## 2020-08-21 ENCOUNTER — TELEPHONE (OUTPATIENT)
Dept: FAMILY MEDICINE CLINIC | Age: 50
End: 2020-08-21

## 2020-08-21 VITALS
WEIGHT: 182 LBS | HEART RATE: 89 BPM | DIASTOLIC BLOOD PRESSURE: 80 MMHG | SYSTOLIC BLOOD PRESSURE: 130 MMHG | BODY MASS INDEX: 27.58 KG/M2 | OXYGEN SATURATION: 100 % | HEIGHT: 68 IN

## 2020-08-21 PROCEDURE — 93971 EXTREMITY STUDY: CPT

## 2020-08-21 PROCEDURE — G8427 DOCREV CUR MEDS BY ELIG CLIN: HCPCS | Performed by: FAMILY MEDICINE

## 2020-08-21 PROCEDURE — G8419 CALC BMI OUT NRM PARAM NOF/U: HCPCS | Performed by: FAMILY MEDICINE

## 2020-08-21 PROCEDURE — 1036F TOBACCO NON-USER: CPT | Performed by: FAMILY MEDICINE

## 2020-08-21 PROCEDURE — 99214 OFFICE O/P EST MOD 30 MIN: CPT | Performed by: FAMILY MEDICINE

## 2020-08-21 RX ORDER — LIDOCAINE 50 MG/G
1 PATCH TOPICAL DAILY
Qty: 30 PATCH | Refills: 1 | Status: ON HOLD | OUTPATIENT
Start: 2020-08-21 | End: 2021-05-07 | Stop reason: ALTCHOICE

## 2020-08-21 RX ORDER — PREDNISONE 20 MG/1
20 TABLET ORAL DAILY
Qty: 7 TABLET | Refills: 0 | Status: SHIPPED | OUTPATIENT
Start: 2020-08-21 | End: 2020-08-28

## 2020-08-21 ASSESSMENT — ENCOUNTER SYMPTOMS: GASTROINTESTINAL NEGATIVE: 1

## 2020-08-21 NOTE — TELEPHONE ENCOUNTER
Potentially but I would recommend waiting til after neurosurg appt as it can sometimes make some things worse

## 2020-08-21 NOTE — PROGRESS NOTES
Name: Prince Lanier  : 1970         Chief Complaint:     Chief Complaint   Patient presents with    Back Pain     severe pain. see Dr. Sahara Moreno .     Leg Pain       History of Present Illness:      Prince Lanier is a 54 y.o.  female who presents with Back Pain (severe pain. see Dr. Sahara Moreno . ) and Leg Pain      HPI     Pt c/o terrible pain in RLE for approx past 3 wks, burning sensation in R great toe, leg also seems to be swelling. R knee swelled huge the other day. Pain goes from back into lateral hip and anterior thigh and leg. Feels like she's \"tipped over\" while walking which seems to be getting worse. Using pain patches on great toe, knee, lateral-posterior hip, and R low back. Using gabapentin 300 mg, started taking BID d/t severity, and also celebrex 100 mg BID. At night taking 3 tabs of tylenol PM plus advil just so she can get some rest. Got a new mattress and tried propping legs way up on pillows. Sleeping on couch now with RLE up on the back of the couch (hip flexion) which does give some relief. Hasn't been able to go to the gym because of pain. Pain with going between standing and sitting. Getting \"knots\" on legs, seems to get more every day. In the past pt has had radicular pain in the LLE and has foot drop on that side. That side actually hasn't been bothering her at all lately. Had MRI last yr and has upcoming neurosurg appt. Past Medical History:     Past Medical History:   Diagnosis Date    Anxiety     Depression     Ulcer     stomach        Past Surgical History:     Past Surgical History:   Procedure Laterality Date     SECTION      CHOLECYSTECTOMY, LAPAROSCOPIC N/A 2017    CHOLECYSTECTOMY LAPAROSCOPIC; photos performed by Ana Maria Yu MD at [address] [facility]        Medications:       Prior to Admission medications    Medication Sig Start Date End Date Taking?  Authorizing Provider   lidocaine (LIDODERM) 5 % Place 1 patch onto the skin daily 12 hours on, 12 hours off. 8/21/20  Yes Jefry Christopher DO   predniSONE (DELTASONE) 20 MG tablet Take 1 tablet by mouth daily for 7 days 8/21/20 8/28/20 Yes Jefry Christopher DO   buPROPion (WELLBUTRIN XL) 150 MG extended release tablet take 1 tablet by mouth every morning 8/3/20   Stephan Gonzales MD   celecoxib (CELEBREX) 100 MG capsule take 1 capsule by mouth twice a day 8/3/20   Stephan Gonzales MD   citalopram (CELEXA) 40 MG tablet Take 1 tablet by mouth daily 6/12/20   Jefry Christopher DO   gabapentin (NEURONTIN) 300 MG capsule Take 1 capsule by mouth daily for 90 days. 6/12/20 9/10/20  Jefry Christopher DO   FOLIC ACID PO Take 709 mg by mouth daily    Historical Provider, MD   fluticasone (FLONASE) 50 MCG/ACT nasal spray 1 spray by Nasal route daily for 7 days Dispose of after course is completed. 3/30/20 4/13/20  DEANDRA Kohler - CNP   saline nasal gel (AYR) GEL by Nasal route as needed for Congestion 5/31/19   Jefry Christopher DO   Vitamin D-Vitamin K [facility]) 5500-200 UNIT-MCG TABS take 1 tablet by mouth daily 2/7/19   Historical Provider, MD   acetaminophen (TYLENOL) 325 MG tablet Take 650 mg by mouth every 6 hours as needed. Historical Provider, MD        Allergies:       Vicodin [hydrocodone-acetaminophen]    Social History:     Tobacco:    reports that she quit smoking about 19 years ago. She quit after 6.00 years of use. She has never used smokeless tobacco.  Alcohol:      reports no history of alcohol use. Drug Use:  reports no history of drug use. Family History:     Family History   Problem Relation Age of Onset    Diabetes Mother     Cancer Mother         Liver Cancer    Heart Disease Father        Review of Systems:     Positive and Negative as described in HPI    Review of Systems   Constitutional: Negative. Gastrointestinal: Negative. Skin: Negative.         Physical Exam:     Vitals:  /80   Pulse 89   Ht 5' 8\" (1.727 m)   Wt 182 lb (82.6 kg)   LMP 10/03/2017 (Exact Date)   SpO2 100%   BMI 27.67 kg/m²   Physical Exam  Vitals signs and nursing note reviewed. Constitutional:       General: She is not in acute distress. Appearance: Normal appearance. She is well-developed. She is not ill-appearing. Cardiovascular:      Comments: Significant varicose veins bilateral lower extremities right worse than left. Mild puffiness, no pitting edema. Positive Jorge with reproduction of pain in the right lower extremity with passive dorsiflexion. Pulmonary:      Effort: Pulmonary effort is normal.   Musculoskeletal:      Comments: Patient standing with pelvis tilted downward on the left, keeps the right hip in slight flexion. Able to rise from chair and get onto the exam table without grimacing or apparent difficulty. Tenderness to palpation on the left PSIS. ASIS's and medial malleoli symmetric, no functional leg length discrepancy in supine position. Negative straight leg raise. Skin:     General: Skin is warm and dry. Neurological:      Mental Status: She is alert and oriented to person, place, and time. Deep Tendon Reflexes:      Reflex Scores:       Patellar reflexes are 3+ on the right side and 3+ on the left side. Comments: 5/5 strength bilateral knee flexion and extension, plantar flexion. 3/5 strength of left foot dorsiflexion.    Psychiatric:         Mood and Affect: Mood normal.         Behavior: Behavior normal.         Judgment: Judgment normal.         Data:     Lab Results   Component Value Date     01/15/2019    K 4.3 01/15/2019     01/15/2019    CO2 27 01/15/2019    BUN 12 01/15/2019    CREATININE 0.84 01/15/2019    GLUCOSE 93 01/15/2019    PROT 7.1 11/07/2018    LABALBU 4.0 11/07/2018    BILITOT 0.25 11/07/2018    ALKPHOS 91 11/07/2018    AST 19 11/07/2018    ALT 16 11/07/2018     Lab Results   Component Value Date    WBC 6.9 05/31/2019    RBC 4.37 05/31/2019    HGB 12.7 05/31/2019    HCT 38.0 05/31/2019    MCV 86.9 05/31/2019    MCH 29.1 05/31/2019    MCHC 33.5 05/31/2019    RDW 13.5 05/31/2019     05/31/2019    MPV NOT REPORTED 05/31/2019     Lab Results   Component Value Date    TSH 3.13 05/31/2019     Lab Results   Component Value Date    CHOL 216 11/07/2018    HDL 76 11/07/2018 7/29/2018 MRI lumbar spine  1. Degenerative grade 1 anterolisthesis of L5 on S1 measures 3 mm with moderate    to severe facet arthropathy more pronounced on the left. There is a 3 mm    broad-based disc protrusion with changes resulting in mild left foraminal    narrowing without central stenosis.         2. A broad-based disc protrusion and facet arthropathy is present at L4-L5    resulting in mild left foraminal narrowing and lateral recess narrowing on the    left. There is near abutment of the left L5 nerve root.         3. Additional discogenic change and facet arthropathy otherwise as described    above.         4. There is mild curvature of the lumbar spine convex to the left. No acute    fracture. Assessment & Plan:        Diagnosis Orders   1. Acute pain of lower extremity, right     2. Right leg swelling     3. Right lumbar radiculopathy     4. Lumbar degenerative disc disease     RLE pain likely radiculopathy but with the perceived swelling and increase in varicosities I do have some suspicion for DVT. Stat doppler ordered. If neg will rx prednisone, lower dose than typical d/t pt request, concern for weight gain as she's had with previous prednisone use. May continue gabapentin and topical lido patches also. Keep upcoming neurosurgery appt. Requested Prescriptions     Signed Prescriptions Disp Refills    lidocaine (LIDODERM) 5 % 30 patch 1     Sig: Place 1 patch onto the skin daily 12 hours on, 12 hours off.     predniSONE (DELTASONE) 20 MG tablet 7 tablet 0     Sig: Take 1 tablet by mouth daily for 7 days       There are no Patient Instructions on file for this visit. Christy Toscano received counseling on the following healthy behaviors: medication adherence  Reviewed prior labs and health maintenance. Continue current medications, diet and exercise. Discussed use, benefit, and side effects of prescribed medications. Barriers to medication compliance addressed. Patient given educational materials - see patient instructions. All patient questions answered. Patient voiced understanding.      Electronically signed by Yun Hyatt DO on 8/21/2020 at 3:30 PM   [address]  Dept: [phone]

## 2020-08-24 ENCOUNTER — HOSPITAL ENCOUNTER (OUTPATIENT)
Dept: VASCULAR LAB | Age: 50
Discharge: HOME | End: 2020-08-23
Payer: COMMERCIAL

## 2020-08-24 PROCEDURE — 93971 EXTREMITY STUDY: CPT

## 2020-08-28 ENCOUNTER — HOSPITAL ENCOUNTER (OUTPATIENT)
Dept: VASCULAR LAB | Age: 50
Discharge: HOME | End: 2020-08-30
Payer: COMMERCIAL

## 2020-08-28 PROCEDURE — 93971 EXTREMITY STUDY: CPT

## 2020-09-03 ENCOUNTER — TELEPHONE (OUTPATIENT)
Dept: FAMILY MEDICINE CLINIC | Age: 50
End: 2020-09-03

## 2020-09-03 NOTE — TELEPHONE ENCOUNTER
Received office note from neurosurgeon and it appears that they do not have patient's MRI from last year. Please fax report. If needed they could also request images were patient could get a disc.

## 2020-09-04 RX ORDER — CITALOPRAM 40 MG/1
TABLET ORAL
Qty: 90 TABLET | Refills: 1 | Status: SHIPPED | OUTPATIENT
Start: 2020-09-04 | End: 2020-12-28

## 2020-09-04 NOTE — TELEPHONE ENCOUNTER
Last visit:  8/21/2020  Next Visit Date:    Future Appointments   Date Time Provider Reginaldo Echavarria   9/14/2020 11:00 AM Bryant Gonzalez, PT [facility] PT Sherren Ferns     Last Med refill:    Medication List:  Prior to Admission medications    Medication Sig Start Date End Date Taking? Authorizing Provider   lidocaine (LIDODERM) 5 % Place 1 patch onto the skin daily 12 hours on, 12 hours off. 8/21/20   Tayler Lama DO   buPROPion (WELLBUTRIN XL) 150 MG extended release tablet take 1 tablet by mouth every morning 8/3/20   Fransico Wells MD   celecoxib (CELEBREX) 100 MG capsule take 1 capsule by mouth twice a day 8/3/20   Fransico Wells MD   citalopram (CELEXA) 40 MG tablet Take 1 tablet by mouth daily 6/12/20   Tayler Lama DO   gabapentin (NEURONTIN) 300 MG capsule Take 1 capsule by mouth daily for 90 days. 6/12/20 9/10/20  Tayler Lama DO   FOLIC ACID PO Take 835 mg by mouth daily    Historical Provider, MD   fluticasone (FLONASE) 50 MCG/ACT nasal spray 1 spray by Nasal route daily for 7 days Dispose of after course is completed. 3/30/20 4/13/20  DEANDRA Chau - CNP   saline nasal gel (AYR) GEL by Nasal route as needed for Congestion 5/31/19   Tayler Lama DO   Vitamin D-Vitamin K [facility]) 5500-200 UNIT-MCG TABS take 1 tablet by mouth daily 2/7/19   Historical Provider, MD   acetaminophen (TYLENOL) 325 MG tablet Take 650 mg by mouth every 6 hours as needed.       Historical Provider, MD       Allergies:  Vicodin [hydrocodone-acetaminophen]    No results found for: LABA1C          ( goal A1C is < 7)   No results found for: LABMICR  LDL Cholesterol (mg/dL)   Date Value   11/07/2018 116       (goal LDL is <100)   AST (U/L)   Date Value   11/07/2018 19     ALT (U/L)   Date Value   11/07/2018 16     BUN (mg/dL)   Date Value   01/15/2019 12     BP Readings from Last 3 Encounters:   08/21/20 130/80   06/12/20 126/78   05/13/20 130/70          (goal 120/80)

## 2020-09-14 ENCOUNTER — HOSPITAL ENCOUNTER (OUTPATIENT)
Dept: PHYSICAL THERAPY | Age: 50
Setting detail: THERAPIES SERIES
Discharge: HOME | End: 2020-09-14
Payer: COMMERCIAL

## 2020-09-14 PROCEDURE — 97161 PT EVAL LOW COMPLEX 20 MIN: CPT

## 2020-09-14 ASSESSMENT — PAIN SCALES - GENERAL: PAINLEVEL_OUTOF10: 8

## 2020-09-14 NOTE — PLAN OF CARE
[facility] RICHARD CALL       Phone: [phone]   Date: 2020                      Outpatient Physical Therapy  Fax: [phone]    ACCT #: [de-identified]                     Plan of Care  [facility]#: [id number]  Patient: Kei Lopez  : 1970    Referring Practitioner: Dr. Elisa Courtney    Referral Date : 20    Diagnosis: Sciatica  Onset Date: 20  Treatment Diagnosis: Lumbar pain with radiculopathy    Assessment  Assessment: Patient presents  with right lumbar pain with radicular symptoms suggestive of nerve impingment. Prognosis: Good    Treatment Plan : Days: 2 times per week Weeks: 6 weeks Total # of Visits Approved: 30    Patient Education/HEP, Therapeutic Exercise, Traction and Dry Needling     Goals: Time Frame for Short term goals: 5 visits  Short term goal 1: Educate on home exercise program of core stabilization and hip strengthening    Time Frame for Long term goals : 10 visits - expires 10/12/2020  Long term goal 1: Decrease subjective lumbar pain to 2/10 with daily activities  Long term goal 2:  Decrease subjective right LE radicular symtoms by > 75%  Long term goal 3: Upgrade home exercise program for additional core stability ex and progression to independent program at local health facility     Augie Hernandez PT   Date: 2020    ______________________________________ Date: 2020  Physician Signature  By signing above or cosigning electronically, I have reviewed this Plan of Care and certify a need for medically necessary rehabilitation services.

## 2020-09-14 NOTE — PROGRESS NOTES
Phone: [phone] [facility]         Fax: [phone]                      Outpatient Physical Therapy                                                                      Evaluation    Date: 2020  Patient: Bhargavi Garcia  : 1970  ACCT #: [de-identified]    Referring Practitioner: Dr. Shayan Weeks    Referral Date : 20    Diagnosis: Sciatica    Treatment Diagnosis: Lumbar pain with radiculopathy  Onset Date: 20  PT Insurance Information: CRS  Total # of Visits Approved: 30 Per Physician Order  Total # of Visits to Date: 1  No Show: 0  Canceled Appointment: 0     Subjective     Additional Pertinent Hx: 3 year onset of progressive right LE radicular pain extending to foot. er pain is worse with sitting/lying down. She reports a prior issue with left foot plantar fasciitis with deviated gait. Presently, she continues to work but compensates due to RLE pain. She has undergone recent x-rays and MRI however results not available. She states that medication does minimal to change pain. She denies LLE pain. PMHx otherwise negative.   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, [employer]  Stresses/Physical Demands of job: Heavy Lifting, Awkward Positions, Repetitive or Pronlonged Grasping(standing/walking)  Leisure & Hobbies: Normally active with daily household tasks; previously went to [employer]    Objective  Vision  Vision: Within Functional Limits  Hearing  Hearing: Within functional limits  Observation/Palpation  Posture: Fair(asymetrical iliac hts in standing; equal in supine)  Palpation: Moderate tenderness right SI/gluteal region  Observation: Deviated gait pattern due to limited weight bearing RLE due to pain  Spine  Lumbar: Forward flex to floor with mild discomfort; sidebending and rotations WFL with mild discomfort  Strength RLE  Strength RLE: Exception  R Hip Flexion: 4+/5  R Knee Extension: 5/5  R Ankle Dorsiflexion: 5/5  AROM RLE (degrees)  RLE AROM: WFL  Strength LLE  Strength LLE: Exception  L Hip Flexion: 4/5  L Knee Extension: 4+/5  L Ankle Dorsiflexion: 4/5  AROM LLE (degrees)  LLE AROM : WFL       AROM RLE (degrees)  RLE AROM: WFL  AROM LLE (degrees)  LLE AROM : WFL        PROM LLE (degrees)  LLE PROM: WFL  LLE General PROM: No discomfort with hip mobility  PROM RLE (degrees)  RLE General PROM: Limited hip ER/IR mobility due to pain     Additional Measures  Special Tests: Moderate pain reduction with right long leg distraction; limited tolerance to sidelying positional distraction                         Assessment  Assessment: Patient presents  with right lumbar pain with radicular symptoms suggestive of nerve impingment. Prognosis: Good  Decision Making: Low Complexity  Exam: Oswestry = 14/50    Clinical Presentation:  Stable/Uncomplicated  The Following Comorbities will impact the patients progression and Plan of Care:  Orthopedic injury left foot         Activity Tolerance: Patient Tolerated treatment well    Education: PT POC; Disc rehydration;   Abdominal breathing with tband facilitation; TA tuck          Goals  Short term goals  Time Frame for Short term goals: 5 visits  Short term goal 1: Educate on home exercise program of core stabilization and hip strengthening    Long term goals  Time Frame for Long term goals : 10 visits - expires 10/12/2020  Long term goal 1: Decrease subjective lumbar pain to 2/10 with daily activities  Long term goal 2:  Decrease subjective right LE radicular symtoms by > 75%  Long term goal 3: Upgrade home exercise program for additional core stability ex and progression to independent program at local health facility    Patient's Goal:    Get rid of back and right leg pain    Timed Code Treatment Minutes: 0 Minutes  Total Treatment Time: 50     Time In: 1110  Time Out: 8810    [id number], PT Date: 9/14/2020

## 2020-09-23 ENCOUNTER — HOSPITAL ENCOUNTER (OUTPATIENT)
Dept: PHYSICAL THERAPY | Age: 50
Setting detail: THERAPIES SERIES
Discharge: HOME | End: 2020-09-23
Payer: COMMERCIAL

## 2020-09-23 PROCEDURE — 97110 THERAPEUTIC EXERCISES: CPT

## 2020-09-23 NOTE — PROGRESS NOTES
Phone: Ly Quinteros      Fax: [phone]                            Outpatient Physical Therapy                                                                            Daily Note    Date: 2020  Patient Name: Simeon Del Angel        MRN: [id number]   ACCT#:  [de-identified]  : 1970  (52 y.o.)    Referring Practitioner: Dr. Sukhjinder Lucero    Referral Date : 20    Diagnosis: Sciatica  Treatment Diagnosis: Lumbar pain with radiculopathy    Onset Date: 20  PT Insurance Information: CRS  Total # of Visits Approved: 30 Per Physician Order  Total # of Visits to Date: 2  No Show: 0  Canceled Appointment: 0  Plan of Care/Certification Expiration Date: 10/12/20    Pre-Treatment Pain:  2/10     Assessment  Assessment: Patient reports good pain reduction rated 2/10; she has been sleeping signficantly better past 3 days. Self ordered a LE bolster for disc rehydration. Noting only right hip discomfort. Initiated additional core strengthening and hip strengthening for HEP. Held initiation of pelvic traction and will progress with ex.     Chart Reviewed: Yes    Plan  Plan: Continue with current plan    Exercises/Modalities/Manual:  See DocFlow Sheet    Education: sidelying clamshells, HS 90, hip iso add, supine hip tband circles          Goals  (Total # of Visits to Date: 2)   Short Term Goals - Time Frame for Short term goals: 5 visits  Short term goal 1: Educate on home exercise program of core stabilization and hip strengthening                Long Term Goals - Time Frame for Long term goals : 10 visits - expires 10/12/2020  Long term goal 1: Decrease subjective lumbar pain to 2/10 with daily activities  Long term goal 2:  Decrease subjective right LE radicular symtoms by > 75%  Long term goal 3: Upgrade home exercise program for additional core stability ex and progression to independent program at local health facility          Post Treatment Pain:  2/10    Time In: 1030    Time Out : 1110        Timed Code Treatment Minutes: 40 Minutes  Total Treatment Time: 36 Minutes    KEILA APARICIO, PT     Date: 9/23/2020

## 2020-09-30 ENCOUNTER — HOSPITAL ENCOUNTER (OUTPATIENT)
Dept: PHYSICAL THERAPY | Age: 50
Setting detail: THERAPIES SERIES
Discharge: HOME | End: 2020-09-30
Payer: COMMERCIAL

## 2020-09-30 PROCEDURE — 97110 THERAPEUTIC EXERCISES: CPT

## 2020-09-30 PROCEDURE — 97012 MECHANICAL TRACTION THERAPY: CPT

## 2020-09-30 ASSESSMENT — PAIN SCALES - GENERAL: PAINLEVEL_OUTOF10: 4

## 2020-09-30 NOTE — PROGRESS NOTES
Phone: Ly Quinteros      Fax: [phone]                            Outpatient Physical Therapy                                                                            Daily Note    Date: 2020  Patient Name: Shanae Santa        MRN: [id number]   ACCT#:  [de-identified]  : 1970  (52 y.o.)    Referring Practitioner: Dr. Kitty Coy    Referral Date : 20    Diagnosis: Sciatica  Treatment Diagnosis: Lumbar pain with radiculopathy    Onset Date: 20  PT Insurance Information: CRS  Total # of Visits Approved: 30 Per Physician Order  Total # of Visits to Date: 3  No Show: 0  Canceled Appointment: 0  Plan of Care/Certification Expiration Date: 10/12/20    Pre-Treatment Pain:  4/10     Assessment  Assessment: Patient rates pain at 4/10 primarily right hip/gluteal region. She states she is sleeping much better and is able to place more weight on the leg at work without discomfort. Improved flexibility noted with manual therapy; initiated anterior hip mob and sidelying hip ext/ITB stretch. Initiated pelvic traction @ 50# for lumbar decompression not achieved with manual tissue massage and stretching. Will monitor.   Patient has difficulty scheduling consistently due to work schedule  Chart Reviewed: Yes    Plan  Plan: Continue with current plan    Exercises/Modalities/Manual:  See DocFlow Sheet    Education: Pelvic traction          Goals  (Total # of Visits to Date: 3)   Short Term Goals - Time Frame for Short term goals: 5 visits  Short term goal 1: Educate on home exercise program of core stabilization and hip strengthening                Long Term Goals - Time Frame for Long term goals : 10 visits - expires 10/12/2020  Long term goal 1: Decrease subjective lumbar pain to 2/10 with daily activities  Long term goal 2:  Decrease subjective right LE radicular symtoms by > 75%  Long term goal 3: Upgrade home exercise program for additional core stability ex and progression to independent program at local health facility          Post Treatment Pain:  3/10    Time In: 1405    Time Out : 1455        Timed Code Treatment Minutes: 35 Minutes  Total Treatment Time: 48 5482 Xiomara Farrell, REYNALDO     Date: 9/30/2020

## 2020-10-02 ENCOUNTER — HOSPITAL ENCOUNTER (OUTPATIENT)
Dept: PHYSICAL THERAPY | Age: 50
Setting detail: THERAPIES SERIES
Discharge: HOME | End: 2020-10-02
Payer: COMMERCIAL

## 2020-10-02 PROCEDURE — 97012 MECHANICAL TRACTION THERAPY: CPT

## 2020-10-02 PROCEDURE — 97110 THERAPEUTIC EXERCISES: CPT

## 2020-10-02 ASSESSMENT — PAIN SCALES - GENERAL: PAINLEVEL_OUTOF10: 1

## 2020-10-02 NOTE — PROGRESS NOTES
Phone: Ly Quinteros      Fax: [phone]                            Outpatient Physical Therapy                                                                            Daily Note    Date: 10/2/2020  Patient Name: Pao Peralta        MRN: [id number]   ACCT#:  [de-identified]  : 1970  (52 y.o.)    Referring Practitioner: Dr. Luis Briseno    Referral Date : 20    Diagnosis: Sciatica  Treatment Diagnosis: Lumbar pain with radiculopathy    Onset Date: 20  PT Insurance Information: CRS  Total # of Visits Approved: 30 Per Physician Order  Total # of Visits to Date: 4  No Show: 0  Canceled Appointment: 0  Plan of Care/Certification Expiration Date: 10/12/20    Pre-Treatment Pain:  1/10     Assessment  Assessment: Patient reports good tolerance to initial traction treatment. Currently rates pain 1/10 in right hip/gluteal region when sitting for periods of time or with sit to stand transfers and stairs. Less tenderness of right gluteal/iliac crest region. Continue with exercise for trunk stab and pelvic traction for lumbar decompression. Patient will contact this department to schedule additional appts when she is notified of her work schedule.   Attempting HEP as able  Chart Reviewed: Yes    Plan  Plan: Continue with current plan    Exercises/Modalities/Manual:  See DocFlow Sheet    Education:           Goals  (Total # of Visits to Date: 4)   Short Term Goals - Time Frame for Short term goals: 5 visits  Short term goal 1: Educate on home exercise program of core stabilization and hip strengthening- MET                Long Term Goals - Time Frame for Long term goals : 10 visits - expires 10/12/2020  Long term goal 1: Decrease subjective lumbar pain to 2/10 with daily activities  Long term goal 2:  Decrease subjective right LE radicular symtoms by > 75%  Long term goal 3: Upgrade home exercise program for additional core stability ex and progression to independent program at local health facility          Post Treatment Pain:  1/10    Time In: 1400    Time Out : 1440        Timed Code Treatment Minutes: 20 Minutes  Total Treatment Time: 36 1233 Xiomara Farrell, REYNALDO     Date: 10/2/2020

## 2020-10-08 ENCOUNTER — OFFICE VISIT (OUTPATIENT)
Dept: FAMILY MEDICINE CLINIC | Age: 50
End: 2020-10-08
Payer: COMMERCIAL

## 2020-10-08 ENCOUNTER — TELEPHONE (OUTPATIENT)
Dept: FAMILY MEDICINE CLINIC | Age: 50
End: 2020-10-08

## 2020-10-08 VITALS
DIASTOLIC BLOOD PRESSURE: 86 MMHG | HEART RATE: 86 BPM | HEIGHT: 68 IN | OXYGEN SATURATION: 98 % | BODY MASS INDEX: 27.74 KG/M2 | SYSTOLIC BLOOD PRESSURE: 120 MMHG | WEIGHT: 183 LBS

## 2020-10-08 PROCEDURE — G8419 CALC BMI OUT NRM PARAM NOF/U: HCPCS | Performed by: FAMILY MEDICINE

## 2020-10-08 PROCEDURE — 99213 OFFICE O/P EST LOW 20 MIN: CPT | Performed by: FAMILY MEDICINE

## 2020-10-08 PROCEDURE — G8484 FLU IMMUNIZE NO ADMIN: HCPCS | Performed by: FAMILY MEDICINE

## 2020-10-08 PROCEDURE — 1036F TOBACCO NON-USER: CPT | Performed by: FAMILY MEDICINE

## 2020-10-08 PROCEDURE — G8427 DOCREV CUR MEDS BY ELIG CLIN: HCPCS | Performed by: FAMILY MEDICINE

## 2020-10-08 RX ORDER — GABAPENTIN 300 MG/1
300 CAPSULE ORAL 3 TIMES DAILY
Qty: 90 CAPSULE | Refills: 0 | Status: SHIPPED | OUTPATIENT
Start: 2020-10-08 | End: 2020-12-28 | Stop reason: SDUPTHER

## 2020-10-08 NOTE — TELEPHONE ENCOUNTER
Please call Dr. Logan Randle office and let them know that despite physical therapy, patient's pain has worsened and she is having what appears to be a right L3 radiculitis. He had seen pt 9/1 and he ordered PT and mentioned ordering MRI but I'm not sure if it was actually ordered. If he prefers, I can order an MRI, but I thought he may want to order it and have it done at [facility] so that the result goes straight to him. Please see what his preference is and we will do what ever he would like. I did increase her gabapentin dose to 300 mg 3 times a day.

## 2020-10-08 NOTE — PROGRESS NOTES
Name: Sylvain Clayton  : 1970         Chief Complaint:     Chief Complaint   Patient presents with    Back Pain    Hip Pain     right       History of Present Illness:      Sylvain Clayton is a 52 y.o.  female who presents with Back Pain and Hip Pain (right)      HPI     C/o ongoing pain in low back and R posterior hip, feels like there's a knot or ball on R posterior hip. Pain radiates around to the groin and anterior thigh. Concerned about this new pain as it was sudden in onset a couple days ago while working, carrying heavy things up stairs. Finds she can't put pressure on RLE without shooting pain up the whole way to the R low back. Continues gabapentin and celebrex and has also been taking advil 600 mg. advil and tylenol help but pain is still present all the time. Most comfortable sitting straight up or standing still. Low back feels weird when she goes to move. Still dragging L toes but not R. Patient did change shoes recently, found out at neurosurgery appointment that her Floyd  sneakers were worn out on one shoe. She ordered these current shoes online and found after getting them that they are too big. Has still been wearing them but is going shopping this weekend to get new ones. Past Medical History:     Past Medical History:   Diagnosis Date    Anxiety     Depression     Ulcer     stomach        Past Surgical History:     Past Surgical History:   Procedure Laterality Date     SECTION      CHOLECYSTECTOMY, LAPAROSCOPIC N/A 2017    CHOLECYSTECTOMY LAPAROSCOPIC; photos performed by Tan Guajardo MD at [facility]        Medications:       Prior to Admission medications    Medication Sig Start Date End Date Taking? Authorizing Provider   gabapentin (NEURONTIN) 300 MG capsule Take 1 capsule by mouth 3 times daily for 30 days.  10/8/20 11/7/20 Yes Debi Chandler DO   citalopram (CELEXA) 40 MG tablet take 1 tablet by mouth once daily 9/4/20   Wan Nicholson, DO   lidocaine (LIDODERM) 5 % Place 1 patch onto the skin daily 12 hours on, 12 hours off. 8/21/20   Wan Nicholson, DO   buPROPion (WELLBUTRIN XL) 150 MG extended release tablet take 1 tablet by mouth every morning 8/3/20   Niels Toussaint MD   celecoxib (CELEBREX) 100 MG capsule take 1 capsule by mouth twice a day 8/3/20   Niels Toussaint MD   FOLIC ACID PO Take 798 mg by mouth daily    Historical Provider, MD   fluticasone (FLONASE) 50 MCG/ACT nasal spray 1 spray by Nasal route daily for 7 days Dispose of after course is completed. 3/30/20 4/13/20  DEANDRA Zavaleta - CNP   saline nasal gel (AYR) GEL by Nasal route as needed for Congestion 5/31/19   Wan Nicholson DO   Vitamin D-Vitamin K [facility]) 5500-200 UNIT-MCG TABS take 1 tablet by mouth daily 2/7/19   Historical Provider, MD   acetaminophen (TYLENOL) 325 MG tablet Take 650 mg by mouth every 6 hours as needed. Historical Provider, MD        Allergies:       Vicodin [hydrocodone-acetaminophen]    Social History:     Tobacco:    reports that she quit smoking about 19 years ago. She quit after 6.00 years of use. She has never used smokeless tobacco.  Alcohol:      reports no history of alcohol use. Drug Use:  reports no history of drug use. Family History:     Family History   Problem Relation Age of Onset    Diabetes Mother     Cancer Mother         Liver Cancer    Heart Disease Father        Review of Systems:     Positive and Negative as described in HPI    Review of Systems   Constitutional: Negative. Respiratory: Negative. Gastrointestinal: Negative. Physical Exam:     Vitals:  /86   Pulse 86   Ht 5' 8\" (1.727 m)   Wt 183 lb (83 kg)   LMP 10/03/2017 (Exact Date)   SpO2 98%   BMI 27.83 kg/m²   Physical Exam  Vitals signs and nursing note reviewed. Constitutional:       General: She is not in acute distress. Appearance: Normal appearance.  She is well-developed. She is not ill-appearing. Pulmonary:      Effort: Pulmonary effort is normal.   Musculoskeletal:      Comments: Decreased lumbar lordosis. Antalgic gait. Left foot 3+/5 strength of dorsiflexion. Normal dorsiflexion in the right foot. Pain in right low back with straight leg raise test on the right. Right hip decreased range of passive internal rotation, pain elicited with maneuver. Negative Agustín. Patient wearing thin soled tennis shoes that are visibly too large for her feet. Insole has come out of the left shoe and is contacting distal medial left leg. Skin:     General: Skin is warm and dry. Neurological:      Mental Status: She is alert and oriented to person, place, and time. Psychiatric:         Judgment: Judgment normal.         Data:     Lab Results   Component Value Date     01/15/2019    K 4.3 01/15/2019     01/15/2019    CO2 27 01/15/2019    BUN 12 01/15/2019    CREATININE 0.84 01/15/2019    GLUCOSE 93 01/15/2019    PROT 7.1 11/07/2018    LABALBU 4.0 11/07/2018    BILITOT 0.25 11/07/2018    ALKPHOS 91 11/07/2018    AST 19 11/07/2018    ALT 16 11/07/2018     Lab Results   Component Value Date    WBC 6.9 05/31/2019    RBC 4.37 05/31/2019    HGB 12.7 05/31/2019    HCT 38.0 05/31/2019    MCV 86.9 05/31/2019    MCH 29.1 05/31/2019    MCHC 33.5 05/31/2019    RDW 13.5 05/31/2019     05/31/2019    MPV NOT REPORTED 05/31/2019     Lab Results   Component Value Date    TSH 3.13 05/31/2019     Lab Results   Component Value Date    CHOL 216 11/07/2018    HDL 76 11/07/2018         Assessment & Plan:        Diagnosis Orders   1. Right lumbar radiculopathy     2. Left foot drop     Chronic low back pain, left foot drop and has had left-sided radicular pain in the past.  More recently despite doing physical therapy for her back, she has developed right lumbar radiculopathy. I do think some of this may have to do with ill fitting foot wear.   Regardless with a new radicular symptoms which are quite severe, I do believe she needs to have her MRI updated. Unclear from neurosurgeons note whether he had ordered an MRI or not. Patient has not been called to schedule I. We will contact his office to see if they would like to order it and have it done at [facility] or if I can order one to be done here at [address]. Increase gabapentin dose for now to 300 mg 3 times a day. May also continue NSAIDs and Tylenol. Continue therapy also. Requested Prescriptions     Signed Prescriptions Disp Refills    gabapentin (NEURONTIN) 300 MG capsule 90 capsule 0     Sig: Take 1 capsule by mouth 3 times daily for 30 days. Patient Instructions   SURVEY:    You may be receiving a survey from [employer] regarding your visit today. You may get this in the mail, through your MyChart or in your email. Please complete the survey to enable us to provide the highest quality of care to you and your family. If you cannot score us as very good ( 5 Stars) on any question, please feel free to call the office to discuss how we could have made your experience exceptional.     Thank you. Clinical Care Team:  Dr. Analia Shaw DO                                           [address] Team:  [facility] January received counseling on the following healthy behaviors: medication adherence  Reviewed prior labs and health maintenance. Continue current medications, diet and exercise. Discussed use, benefit, and side effects of prescribed medications. Barriers to medication compliance addressed. Patient given educational materials - see patient instructions. All patient questions answered. Patient voiced understanding.      Electronically signed by Analia Shaw DO on 10/11/2020 at 9:41 PM   [place] [phone]  Dept: [phone]

## 2020-10-08 NOTE — TELEPHONE ENCOUNTER
Spoke with Dr. Zeinab Zavala office who states that they will contact the patient and order a STAT MRI. Patient notified.

## 2020-10-08 NOTE — PATIENT INSTRUCTIONS
SURVEY:    You may be receiving a survey from [employer] regarding your visit today. You may get this in the mail, through your MyChart or in your email. Please complete the survey to enable us to provide the highest quality of care to you and your family. If you cannot score us as very good ( 5 Stars) on any question, please feel free to call the office to discuss how we could have made your experience exceptional.     Thank you.     Clinical Care Team:  Dr. Adriana Bagley, DO Ant Tavares, [address] [place] [address] Team:  [address] [place] [address]

## 2020-10-11 ASSESSMENT — ENCOUNTER SYMPTOMS
RESPIRATORY NEGATIVE: 1
GASTROINTESTINAL NEGATIVE: 1

## 2020-11-10 ENCOUNTER — OFFICE VISIT (OUTPATIENT)
Dept: FAMILY MEDICINE CLINIC | Age: 50
End: 2020-11-10
Payer: COMMERCIAL

## 2020-11-10 VITALS
OXYGEN SATURATION: 99 % | WEIGHT: 183 LBS | HEART RATE: 76 BPM | DIASTOLIC BLOOD PRESSURE: 78 MMHG | BODY MASS INDEX: 27.74 KG/M2 | SYSTOLIC BLOOD PRESSURE: 128 MMHG | HEIGHT: 68 IN

## 2020-11-10 PROCEDURE — 99213 OFFICE O/P EST LOW 20 MIN: CPT | Performed by: FAMILY MEDICINE

## 2020-11-10 PROCEDURE — 1036F TOBACCO NON-USER: CPT | Performed by: FAMILY MEDICINE

## 2020-11-10 PROCEDURE — G8484 FLU IMMUNIZE NO ADMIN: HCPCS | Performed by: FAMILY MEDICINE

## 2020-11-10 PROCEDURE — G8427 DOCREV CUR MEDS BY ELIG CLIN: HCPCS | Performed by: FAMILY MEDICINE

## 2020-11-10 PROCEDURE — G8419 CALC BMI OUT NRM PARAM NOF/U: HCPCS | Performed by: FAMILY MEDICINE

## 2020-11-10 PROCEDURE — 20610 DRAIN/INJ JOINT/BURSA W/O US: CPT | Performed by: FAMILY MEDICINE

## 2020-11-10 RX ORDER — METHYLPREDNISOLONE ACETATE 40 MG/ML
40 INJECTION, SUSPENSION INTRA-ARTICULAR; INTRALESIONAL; INTRAMUSCULAR; SOFT TISSUE ONCE
Status: COMPLETED | OUTPATIENT
Start: 2020-11-10 | End: 2020-11-10

## 2020-11-10 RX ADMIN — METHYLPREDNISOLONE ACETATE 40 MG: 40 INJECTION, SUSPENSION INTRA-ARTICULAR; INTRALESIONAL; INTRAMUSCULAR; SOFT TISSUE at 08:50

## 2020-11-10 ASSESSMENT — ENCOUNTER SYMPTOMS: RESPIRATORY NEGATIVE: 1

## 2020-11-10 NOTE — PROGRESS NOTES
Name: Sylvain Clayton  : 1970         Chief Complaint:     Chief Complaint   Patient presents with    Hip Pain    Leg Pain       History of Present Illness:      Sylvain Clayton is a 52 y.o.  female who presents with Hip Pain and Leg Pain      HPI     C/o ongoing pain R posterior and lateral hip, anne-marie if she tries to step up onto a step leading with the R foot. Pain radiates to the knee. Really bad in bed at night. Last night took a friend's ibuprofen 800 mg. She has been doing some of the exercises that she was told in physical therapy, but after a while the exercises start to hurt. She got better sneakers for work which did seem to help also. Had lumbar MRI and has f/u with NS Dr Tanna Castellanos  but is seeking relief sooner. Bruising of R leg and prominent veins after work shift. Hasn't been using compression stockings. Past Medical History:     Past Medical History:   Diagnosis Date    Anxiety     Depression     Ulcer     stomach        Past Surgical History:     Past Surgical History:   Procedure Laterality Date     SECTION      CHOLECYSTECTOMY, LAPAROSCOPIC N/A 2017    CHOLECYSTECTOMY LAPAROSCOPIC; photos performed by Tan Guajardo MD at [facility]        Medications:       Prior to Admission medications    Medication Sig Start Date End Date Taking? Authorizing Provider   Multiple Vitamins-Minerals (HAIR SKIN AND NAILS FORMULA PO) Take by mouth   Yes Historical Provider, MD   TURMERIC PO Take by mouth   Yes Historical Provider, MD   celecoxib (CELEBREX) 100 MG capsule take 1 capsule by mouth twice a day 10/30/20  Yes Debi Chandler DO   gabapentin (NEURONTIN) 300 MG capsule Take 1 capsule by mouth 3 times daily for 30 days.  10/8/20 11/10/20 Yes Debi Chandler DO   citalopram (CELEXA) 40 MG tablet take 1 tablet by mouth once daily 20  Yes Lindsey Lucero, DO   lidocaine (LIDODERM) 5 % Place 1 patch onto the skin with KYLE maneuver. Tenderness to palpation over the greater trochanter and just superior to the greater trochanter along the IT band   Skin:     General: Skin is warm and dry. Neurological:      Mental Status: She is alert and oriented to person, place, and time. Psychiatric:         Judgment: Judgment normal.       Right greater trochanter injection: Verbal consent obtained. Patient in left lateral recumbent position. Area of maximum tenderness identified and marked. Prepped with Betadine and alcohol. Injected Depo-Medrol 40 mg and 2 mL 1% plain lidocaine using 25-gauge, 1.5 inch needle. Tolerated well. Data:     Lab Results   Component Value Date     01/15/2019    K 4.3 01/15/2019     01/15/2019    CO2 27 01/15/2019    BUN 12 01/15/2019    CREATININE 0.84 01/15/2019    GLUCOSE 93 01/15/2019    PROT 7.1 11/07/2018    LABALBU 4.0 11/07/2018    BILITOT 0.25 11/07/2018    ALKPHOS 91 11/07/2018    AST 19 11/07/2018    ALT 16 11/07/2018     Lab Results   Component Value Date    WBC 6.9 05/31/2019    RBC 4.37 05/31/2019    HGB 12.7 05/31/2019    HCT 38.0 05/31/2019    MCV 86.9 05/31/2019    MCH 29.1 05/31/2019    MCHC 33.5 05/31/2019    RDW 13.5 05/31/2019     05/31/2019    MPV NOT REPORTED 05/31/2019     Lab Results   Component Value Date    TSH 3.13 05/31/2019     Lab Results   Component Value Date    CHOL 216 11/07/2018    HDL 76 11/07/2018     10/18/2020 MRI lumbar spine  Mild multilevel degenerative changes with up to mild left foraminal narrowing   at L5-S1. No significant canal stenosis. Assessment & Plan:        Diagnosis Orders   1. Greater trochanteric bursitis of right hip  methylPREDNISolone acetate (DEPO-MEDROL) injection 40 mg    [address] - [place] DRAIN/INJECT LARGE JOINT/BURSA   Pain of the right lateral hip, consistent with bursitis though with little more radiating pain than typically expected.   Reviewed recent MRI lumbar spine as above which did not provide any explanation for the symptoms. She does have significant tenderness over the greater trochanter. Injection performed as above and tolerated well. Follow-up if not improving. Also keep follow-up with neurosurgery next week. Requested Prescriptions      No prescriptions requested or ordered in this encounter       Patient Instructions   SURVEY:    You may be receiving a survey from [employer] regarding your visit today. You may get this in the mail, through your MyChart or in your email. Please complete the survey to enable us to provide the highest quality of care to you and your family. If you cannot score us as very good ( 5 Stars) on any question, please feel free to call the office to discuss how we could have made your experience exceptional.     Thank you. Clinical Care Team:  Dr. Analia Shaw DO                                           [facility], [address] [place] [address] Team:  [place] January received counseling on the following healthy behaviors: medication adherence  Reviewed prior labs and health maintenance. Continue current medications, diet and exercise. Discussed use, benefit, and side effects of prescribed medications. Barriers to medication compliance addressed. Patient given educational materials - see patient instructions. All patient questions answered. Patient voiced understanding.      Electronically signed by Analia Shaw DO on 11/12/2020 at 8:59 PM   [address]  Dept: [phone]

## 2020-11-10 NOTE — PATIENT INSTRUCTIONS
SURVEY:    You may be receiving a survey from [employer] regarding your visit today. You may get this in the mail, through your MyChart or in your email. Please complete the survey to enable us to provide the highest quality of care to you and your family. If you cannot score us as very good ( 5 Stars) on any question, please feel free to call the office to discuss how we could have made your experience exceptional.     Thank you.     Clinical Care Team:  Dr. Bridger Vale, DO Alida Galvez, [address] [place] [address] Team:  [address] [place] [address]

## 2020-12-03 ENCOUNTER — OFFICE VISIT (OUTPATIENT)
Dept: FAMILY MEDICINE CLINIC | Age: 50
End: 2020-12-03
Payer: COMMERCIAL

## 2020-12-03 ENCOUNTER — HOSPITAL ENCOUNTER (OUTPATIENT)
Dept: GENERAL RADIOLOGY | Age: 50
Discharge: HOME | End: 2020-12-05
Payer: COMMERCIAL

## 2020-12-03 ENCOUNTER — HOSPITAL ENCOUNTER (OUTPATIENT)
Age: 50
Discharge: HOME | End: 2020-12-05
Payer: COMMERCIAL

## 2020-12-03 VITALS
BODY MASS INDEX: 27.58 KG/M2 | HEIGHT: 68 IN | OXYGEN SATURATION: 99 % | HEART RATE: 85 BPM | DIASTOLIC BLOOD PRESSURE: 80 MMHG | SYSTOLIC BLOOD PRESSURE: 130 MMHG | WEIGHT: 182 LBS

## 2020-12-03 PROCEDURE — G8427 DOCREV CUR MEDS BY ELIG CLIN: HCPCS | Performed by: FAMILY MEDICINE

## 2020-12-03 PROCEDURE — G8419 CALC BMI OUT NRM PARAM NOF/U: HCPCS | Performed by: FAMILY MEDICINE

## 2020-12-03 PROCEDURE — G8484 FLU IMMUNIZE NO ADMIN: HCPCS | Performed by: FAMILY MEDICINE

## 2020-12-03 PROCEDURE — 99213 OFFICE O/P EST LOW 20 MIN: CPT | Performed by: FAMILY MEDICINE

## 2020-12-03 PROCEDURE — 73564 X-RAY EXAM KNEE 4 OR MORE: CPT

## 2020-12-03 PROCEDURE — 73502 X-RAY EXAM HIP UNI 2-3 VIEWS: CPT

## 2020-12-03 PROCEDURE — 1036F TOBACCO NON-USER: CPT | Performed by: FAMILY MEDICINE

## 2020-12-03 NOTE — PROGRESS NOTES
Name: Delores Fan  : 1970         Chief Complaint:     Chief Complaint   Patient presents with    Pain     right leg, hip and arm       History of Present Illness:      Delores Fan is a 52 y.o.  female who presents with Pain (right leg, hip and arm)      HPI     Pt c/o RLE pain. R hip improved for a day or so with steroid injection but pain never went away. Now having pain in the knee also, goes from hip to knee, anne-marie when she first stands up. Knee also aches overnight. 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 No weakness or numbness in RLE. Taking celebrex, gabapentin. Also c/o R lateral elbow pain for past couple wks, hurts with lifting. Non-radiating. No inciting injury. Using voltaren gel without much help. Past Medical History:     Past Medical History:   Diagnosis Date    Anxiety     Depression     Ulcer     stomach        Past Surgical History:     Past Surgical History:   Procedure Laterality Date     SECTION      CHOLECYSTECTOMY, LAPAROSCOPIC N/A 2017    CHOLECYSTECTOMY LAPAROSCOPIC; photos performed by Cookie Arriaza MD at [address] [facility]        Medications:       Prior to Admission medications    Medication Sig Start Date End Date Taking? Authorizing Provider   diclofenac sodium (VOLTAREN) 1 % GEL Apply topically 2 times daily   Yes Historical Provider, MD   Multiple Vitamins-Minerals (HAIR SKIN AND NAILS FORMULA PO) Take by mouth   Yes Historical Provider, MD   TURMERIC PO Take by mouth   Yes Historical Provider, MD   celecoxib (CELEBREX) 100 MG capsule take 1 capsule by mouth twice a day 10/30/20  Yes Ian Reynolds DO   gabapentin (NEURONTIN) 300 MG capsule Take 1 capsule by mouth 3 times daily for 30 days.  10/8/20 12/3/20 Yes Ian Reynolds DO   citalopram (CELEXA) 40 MG tablet take 1 tablet by mouth once daily 20  Yes Lindsey Lucero,    lidocaine (LIDODERM) 5 % Place 1 patch onto the skin daily 12 hours on, 12 hours off. 8/21/20  Yes Desi Bolivar DO   buPROPion (WELLBUTRIN XL) 150 MG extended release tablet take 1 tablet by mouth every morning 8/3/20  Yes Hermila Jennings MD   FOLIC ACID PO Take 4,971 mg by mouth daily    Yes Historical Provider, MD   fluticasone (FLONASE) 50 MCG/ACT nasal spray 1 spray by Nasal route daily for 7 days Dispose of after course is completed. 3/30/20 12/3/20 Yes DEANDRA Velasco Ma - CNP   saline nasal gel (AYR) GEL by Nasal route as needed for Congestion 5/31/19  Yes Desi Bolivar DO   Vitamin D-Vitamin K [facility]) 5500-200 UNIT-MCG TABS take 1 tablet by mouth daily 2/7/19  Yes Historical Provider, MD   acetaminophen (TYLENOL) 325 MG tablet Take 650 mg by mouth every 6 hours as needed. Yes Historical Provider, MD        Allergies:       Vicodin [hydrocodone-acetaminophen]    Social History:     Tobacco:    reports that she quit smoking about 19 years ago. She quit after 6.00 years of use. She has never used smokeless tobacco.  Alcohol:      reports no history of alcohol use. Drug Use:  reports no history of drug use. Family History:     Family History   Problem Relation Age of Onset    Diabetes Mother     Cancer Mother         Liver Cancer    Heart Disease Father        Review of Systems:     Positive and Negative as described in HPI    Review of Systems   Constitutional: Negative. Skin: Negative. Neurological: Negative. Physical Exam:     Vitals:  /80   Pulse 85   Ht 5' 8\" (1.727 m)   Wt 182 lb (82.6 kg)   LMP 10/03/2017 (Exact Date)   SpO2 99%   BMI 27.67 kg/m²   Physical Exam  Vitals signs and nursing note reviewed. Constitutional:       General: She is not in acute distress. Appearance: Normal appearance. She is well-developed. She is not ill-appearing.    Pulmonary:      Effort: Pulmonary effort is normal.   Musculoskeletal:      Comments: R hip decreased ROM, pain with passive internal rotation and with KYLE (lateral and anterior hip). R knee normal ROM, no effusion, normal alignment. Neg anterior and posterior drawer, meniscal testing, varus and valgus testing. Increased mobility of patella. R elbow normal ROM, no swelling. TTP radial head and lateral epicondyle. Pain elicited with resisted wrist extension. Skin:     General: Skin is warm and dry. Neurological:      Mental Status: She is alert and oriented to person, place, and time. Psychiatric:         Judgment: Judgment normal.         Data:     Lab Results   Component Value Date     01/15/2019    K 4.3 01/15/2019     01/15/2019    CO2 27 01/15/2019    BUN 12 01/15/2019    CREATININE 0.84 01/15/2019    GLUCOSE 93 01/15/2019    PROT 7.1 11/07/2018    LABALBU 4.0 11/07/2018    BILITOT 0.25 11/07/2018    ALKPHOS 91 11/07/2018    AST 19 11/07/2018    ALT 16 11/07/2018     Lab Results   Component Value Date    WBC 6.9 05/31/2019    RBC 4.37 05/31/2019    HGB 12.7 05/31/2019    HCT 38.0 05/31/2019    MCV 86.9 05/31/2019    MCH 29.1 05/31/2019    MCHC 33.5 05/31/2019    RDW 13.5 05/31/2019     05/31/2019    MPV NOT REPORTED 05/31/2019     Lab Results   Component Value Date    TSH 3.13 05/31/2019     Lab Results   Component Value Date    CHOL 216 11/07/2018    HDL 76 11/07/2018         Assessment & Plan:        Diagnosis Orders   1. Chronic right hip pain  XR HIP 2-3 VW W PELVIS RIGHT   2. Chronic pain of right knee  XR KNEE RIGHT (MIN 4 VIEWS)   3. Right lateral epicondylitis     ongoing R hip pain, mainly anterior and lateral. Decreased ROM of the hip. Pain in same knee. ddx still includes lumbar radic, also hip OA, knee OA. xr's ordered as above. R tennis elbow, try exercises as below, otc brace. May continue voltaren gel prn. F/u if not improving.          Requested Prescriptions      No prescriptions requested or ordered in this encounter       Patient Instructions       Patient Education        Tennis Elbow: Exercises  Introduction  Here are some examples of exercises for you to try. The exercises may be suggested for a condition or for rehabilitation. Start each exercise slowly. Ease off the exercises if you start to have pain. You will be told when to start these exercises and which ones will work best for you. How to do the exercises  Wrist flexor stretch   1. Extend your arm in front of you with your palm up. 2. Bend your wrist, pointing your hand toward the floor. 3. With your other hand, gently bend your wrist farther until you feel a mild to moderate stretch in your forearm. 4. Hold for at least 15 to 30 seconds. Repeat 2 to 4 times. Wrist extensor stretch   1. Repeat steps 1 to 4 of the stretch above but begin with your extended hand palm down. Ball or sock squeeze   1. Hold a tennis ball (or a rolled-up sock) in your hand. 2. Make a fist around the ball (or sock) and squeeze. 3. Hold for about 6 seconds, and then relax for up to 10 seconds. 4. Repeat 8 to 12 times. 5. Switch the ball (or sock) to your other hand and do 8 to 12 times. Wrist deviation   1. Sit so that your arm is supported but your hand hangs off the edge of a flat surface, such as a table. 2. Hold your hand out like you are shaking hands with someone. 3. Move your hand up and down. 4. Repeat this motion 8 to 12 times. 5. Switch arms. 6. Try to do this exercise twice with each hand. Wrist curls   1. Place your forearm on a table with your hand hanging over the edge of the table, palm up. 2. Place a 1- to 2-pound weight in your hand. This may be a dumbbell, a can of food, or a filled water bottle. 3. Slowly raise and lower the weight while keeping your forearm on the table and your palm facing up. 4. Repeat this motion 8 to 12 times. 5. Switch arms, and do steps 1 through 4.  6. Repeat with your hand facing down toward the floor. Switch arms. Biceps curls   1.  Sit leaning forward with your legs slightly spread and your left hand on your left thigh. 2. Place your right elbow on your right thigh, and hold the weight with your forearm horizontal.  3. Slowly curl the weight up and toward your chest.  4. Repeat this motion 8 to 12 times. 5. Switch arms, and do steps 1 through 4. Follow-up care is a key part of your treatment and safety. Be sure to make and go to all appointments, and call your doctor if you are having problems. It's also a good idea to know your test results and keep a list of the medicines you take. Where can you learn more? Go to https://Âµ-GPS Opticspepiceweb.9Lenses. org and sign in to your GLOBALDRUM account. Enter I313 in the TapCrowd box to learn more about \"Tennis Elbow: Exercises. \"     If you do not have an account, please click on the \"Sign Up Now\" link. Current as of: March 2, 2020               Content Version: 12.6  © 2006-2020 [employer]. Care instructions adapted under license by [facility] ([facility]). If you have questions about a medical condition or this instruction, always ask your healthcare professional. [place] [address] any warranty or liability for your use of this information. Philip Sharif received counseling on the following healthy behaviors: medication adherence  Reviewed prior labs and health maintenance. Continue current medications, diet and exercise. Discussed use, benefit, and side effects of prescribed medications. Barriers to medication compliance addressed. Patient given educational materials - see patient instructions. All patient questions answered. Patient voiced understanding.      Electronically signed by Desi Bolivar DO on 12/6/2020 at 12:06 AM   [address] [place], [address]  Dept: [phone]

## 2020-12-03 NOTE — PATIENT INSTRUCTIONS
Patient Education        Tennis Elbow: Exercises  Introduction  Here are some examples of exercises for you to try. The exercises may be suggested for a condition or for rehabilitation. Start each exercise slowly. Ease off the exercises if you start to have pain. You will be told when to start these exercises and which ones will work best for you. How to do the exercises  Wrist flexor stretch   1. Extend your arm in front of you with your palm up. 2. Bend your wrist, pointing your hand toward the floor. 3. With your other hand, gently bend your wrist farther until you feel a mild to moderate stretch in your forearm. 4. Hold for at least 15 to 30 seconds. Repeat 2 to 4 times. Wrist extensor stretch   1. Repeat steps 1 to 4 of the stretch above but begin with your extended hand palm down. Ball or sock squeeze   1. Hold a tennis ball (or a rolled-up sock) in your hand. 2. Make a fist around the ball (or sock) and squeeze. 3. Hold for about 6 seconds, and then relax for up to 10 seconds. 4. Repeat 8 to 12 times. 5. Switch the ball (or sock) to your other hand and do 8 to 12 times. Wrist deviation   1. Sit so that your arm is supported but your hand hangs off the edge of a flat surface, such as a table. 2. Hold your hand out like you are shaking hands with someone. 3. Move your hand up and down. 4. Repeat this motion 8 to 12 times. 5. Switch arms. 6. Try to do this exercise twice with each hand. Wrist curls   1. Place your forearm on a table with your hand hanging over the edge of the table, palm up. 2. Place a 1- to 2-pound weight in your hand. This may be a dumbbell, a can of food, or a filled water bottle. 3. Slowly raise and lower the weight while keeping your forearm on the table and your palm facing up. 4. Repeat this motion 8 to 12 times. 5. Switch arms, and do steps 1 through 4.  6. Repeat with your hand facing down toward the floor. Switch arms. Biceps curls   1.  Sit leaning forward with your legs slightly spread and your left hand on your left thigh. 2. Place your right elbow on your right thigh, and hold the weight with your forearm horizontal.  3. Slowly curl the weight up and toward your chest.  4. Repeat this motion 8 to 12 times. 5. Switch arms, and do steps 1 through 4. Follow-up care is a key part of your treatment and safety. Be sure to make and go to all appointments, and call your doctor if you are having problems. It's also a good idea to know your test results and keep a list of the medicines you take. Where can you learn more? Go to https://ExaDigmpeDataminreb.VitalsGuard. org and sign in to your Victrix account. Enter Y590 in the Giftology box to learn more about \"Tennis Elbow: Exercises. \"     If you do not have an account, please click on the \"Sign Up Now\" link. Current as of: March 2, 2020               Content Version: 12.6  © 2006-2020 [employer]. Care instructions adapted under license by [facility] ([facility]). If you have questions about a medical condition or this instruction, always ask your healthcare professional. [place] [address] any warranty or liability for your use of this information.

## 2020-12-07 ENCOUNTER — TELEPHONE (OUTPATIENT)
Dept: FAMILY MEDICINE CLINIC | Age: 50
End: 2020-12-07

## 2020-12-28 ENCOUNTER — OFFICE VISIT (OUTPATIENT)
Dept: FAMILY MEDICINE CLINIC | Age: 50
End: 2020-12-28
Payer: COMMERCIAL

## 2020-12-28 VITALS
HEIGHT: 68 IN | OXYGEN SATURATION: 98 % | HEART RATE: 75 BPM | BODY MASS INDEX: 28.19 KG/M2 | SYSTOLIC BLOOD PRESSURE: 110 MMHG | WEIGHT: 186 LBS | DIASTOLIC BLOOD PRESSURE: 78 MMHG

## 2020-12-28 PROCEDURE — G8484 FLU IMMUNIZE NO ADMIN: HCPCS | Performed by: FAMILY MEDICINE

## 2020-12-28 PROCEDURE — 1036F TOBACCO NON-USER: CPT | Performed by: FAMILY MEDICINE

## 2020-12-28 PROCEDURE — 99213 OFFICE O/P EST LOW 20 MIN: CPT | Performed by: FAMILY MEDICINE

## 2020-12-28 PROCEDURE — G8427 DOCREV CUR MEDS BY ELIG CLIN: HCPCS | Performed by: FAMILY MEDICINE

## 2020-12-28 PROCEDURE — G8419 CALC BMI OUT NRM PARAM NOF/U: HCPCS | Performed by: FAMILY MEDICINE

## 2020-12-28 RX ORDER — DULOXETIN HYDROCHLORIDE 60 MG/1
60 CAPSULE, DELAYED RELEASE ORAL DAILY
Qty: 30 CAPSULE | Refills: 5 | Status: SHIPPED | OUTPATIENT
Start: 2020-12-28 | End: 2021-01-27

## 2020-12-28 RX ORDER — GABAPENTIN 300 MG/1
300 CAPSULE ORAL 3 TIMES DAILY
Qty: 90 CAPSULE | Refills: 2 | Status: SHIPPED | OUTPATIENT
Start: 2020-12-28 | End: 2021-04-12 | Stop reason: SDUPTHER

## 2020-12-28 RX ORDER — PHENTERMINE HYDROCHLORIDE 37.5 MG/1
37.5 TABLET ORAL
Qty: 30 TABLET | Refills: 0 | Status: SHIPPED | OUTPATIENT
Start: 2020-12-28 | End: 2021-01-08 | Stop reason: SDUPTHER

## 2020-12-28 ASSESSMENT — ENCOUNTER SYMPTOMS
RESPIRATORY NEGATIVE: 1
GASTROINTESTINAL NEGATIVE: 1

## 2020-12-28 NOTE — PROGRESS NOTES
Name: Jeb Newsome  : 1970         Chief Complaint:     Chief Complaint   Patient presents with    Hip Pain       History of Present Illness:      Jeb Newsome is a 52 y.o.  female who presents with Hip Pain      HPI     Ongoing pain R hip radiating down anterior thigh to knee which also aches. Terrible pain with standing and also overnight. Gabapentin TID, celebrex, biofreeze. Chronic L foot pain also, pronates despite use of arch support, plans to see podiatry soon to see what else she may be able to do to help in case it's affecting gait and subsequently the R hip. C/o weight gain, overweight, difficulty losing d/t R hip pain and believes the pain is probably worsened by her weight. Interested in adipex again which has helped in the past with energy levels and ability to exercise. Takes dosoquin, 9110 mg of folic acid, skin-hair-nails vitamin, turmeric. Past Medical History:     Past Medical History:   Diagnosis Date    Anxiety     Depression     Ulcer     stomach        Past Surgical History:     Past Surgical History:   Procedure Laterality Date     SECTION      CHOLECYSTECTOMY, LAPAROSCOPIC N/A 2017    CHOLECYSTECTOMY LAPAROSCOPIC; photos performed by Melanie Garner MD at [address] [facility]        Medications:       Prior to Admission medications    Medication Sig Start Date End Date Taking? Authorizing Provider   phentermine (ADIPEX-P) 37.5 MG tablet Take 1 tablet by mouth every morning (before breakfast) for 30 days. 20 Yes Zeb Alvarez, DO   DULoxetine (CYMBALTA) 60 MG extended release capsule Take 1 capsule by mouth daily 20  Yes Zeb Alvarez, DO   gabapentin (NEURONTIN) 300 MG capsule Take 1 capsule by mouth 3 times daily for 90 days.  12/28/20 3/28/21 Yes Zeb Alvarez, DO   diclofenac sodium (VOLTAREN) 1 % GEL Apply topically 2 times daily    Historical Provider, MD Multiple Vitamins-Minerals (HAIR SKIN AND NAILS FORMULA PO) Take by mouth    Historical Provider, MD   TURMERIC PO Take by mouth    Historical Provider, MD   celecoxib (CELEBREX) 100 MG capsule take 1 capsule by mouth twice a day 10/30/20   Rina Parr DO   lidocaine (LIDODERM) 5 % Place 1 patch onto the skin daily 12 hours on, 12 hours off. 8/21/20   Rina Parr DO   buPROPion (WELLBUTRIN XL) 150 MG extended release tablet take 1 tablet by mouth every morning 8/3/20   Ilsa Aase, MD   FOLIC ACID PO Take 5,546 mg by mouth daily     Historical Provider, MD   fluticasone (FLONASE) 50 MCG/ACT nasal spray 1 spray by Nasal route daily for 7 days Dispose of after course is completed. 3/30/20 12/3/20  Bailey July, APRN - CNP   saline nasal gel (AYR) GEL by Nasal route as needed for Congestion 5/31/19   Rina Parr DO   Vitamin D-Vitamin K [facility]) 5500-200 UNIT-MCG TABS take 1 tablet by mouth daily 2/7/19   Historical Provider, MD   acetaminophen (TYLENOL) 325 MG tablet Take 650 mg by mouth every 6 hours as needed. Historical Provider, MD        Allergies:       Vicodin [hydrocodone-acetaminophen]    Social History:     Tobacco:    reports that she quit smoking about 19 years ago. She quit after 6.00 years of use. She has never used smokeless tobacco.  Alcohol:      reports no history of alcohol use. Drug Use:  reports no history of drug use. Family History:     Family History   Problem Relation Age of Onset    Diabetes Mother     Cancer Mother         Liver Cancer    Heart Disease Father        Review of Systems:     Positive and Negative as described in HPI    Review of Systems   Constitutional: Negative. Respiratory: Negative. Gastrointestinal: Negative. Physical Exam:     Vitals:  /78   Pulse 75   Ht 5' 8\" (1.727 m)   Wt 186 lb (84.4 kg)   LMP 10/03/2017 (Exact Date)   SpO2 98%   BMI 28.28 kg/m²   Physical Exam  Vitals signs and nursing note reviewed. Constitutional:       General: She is not in acute distress. Appearance: Normal appearance. She is well-developed. She is not ill-appearing. Pulmonary:      Effort: Pulmonary effort is normal.   Musculoskeletal:      Comments: R hip normal ROM, pain with passive abduction, passive internal rotation. Antalgic gait. Able to stand on toes. Unable to stand on L heel. Skin:     General: Skin is warm and dry. Neurological:      Mental Status: She is alert and oriented to person, place, and time. Psychiatric:         Judgment: Judgment normal.         Data:     Lab Results   Component Value Date     01/15/2019    K 4.3 01/15/2019     01/15/2019    CO2 27 01/15/2019    BUN 12 01/15/2019    CREATININE 0.84 01/15/2019    GLUCOSE 93 01/15/2019    PROT 7.1 11/07/2018    LABALBU 4.0 11/07/2018    BILITOT 0.25 11/07/2018    ALKPHOS 91 11/07/2018    AST 19 11/07/2018    ALT 16 11/07/2018     Lab Results   Component Value Date    WBC 6.9 05/31/2019    RBC 4.37 05/31/2019    HGB 12.7 05/31/2019    HCT 38.0 05/31/2019    MCV 86.9 05/31/2019    MCH 29.1 05/31/2019    MCHC 33.5 05/31/2019    RDW 13.5 05/31/2019     05/31/2019    MPV NOT REPORTED 05/31/2019     Lab Results   Component Value Date    TSH 3.13 05/31/2019     Lab Results   Component Value Date    CHOL 216 11/07/2018    HDL 76 11/07/2018         Assessment & Plan:        Diagnosis Orders   1. Chronic right hip pain  MRI HIP RIGHT WO CONTRAST    phentermine (ADIPEX-P) 37.5 MG tablet   2. Overweight  phentermine (ADIPEX-P) 37.5 MG tablet   3. Venous insufficiency  phentermine (ADIPEX-P) 37.5 MG tablet   ongoing R hip pain, radiates down femur to anterior knee. Some neuropathic characteristics. Has had lumbar imaging showing DDD but no R-sided foraminal narrowing. Neg hip and knee xrays. Little relief from greater troch bursa injection. Hip MRI ordered for further eval. Has upcoming pain mgmt appt. Overweight, weight gain, venous insufficiency - would benefit from weight loss. Difficulty losing d/t hip pain, inability to exercise. adipex prescribed but heard from pharm after appt that she won't be eligible for med until mid January. Requested Prescriptions     Signed Prescriptions Disp Refills    phentermine (ADIPEX-P) 37.5 MG tablet 30 tablet 0     Sig: Take 1 tablet by mouth every morning (before breakfast) for 30 days.  DULoxetine (CYMBALTA) 60 MG extended release capsule 30 capsule 5     Sig: Take 1 capsule by mouth daily    gabapentin (NEURONTIN) 300 MG capsule 90 capsule 2     Sig: Take 1 capsule by mouth 3 times daily for 90 days. There are no Patient Instructions on file for this visit. [facility] received counseling on the following healthy behaviors: medication adherence  Reviewed prior labs and health maintenance. Continue current medications, diet and exercise. Discussed use, benefit, and side effects of prescribed medications. Barriers to medication compliance addressed. Patient given educational materials - see patient instructions. All patient questions answered. Patient voiced understanding.      Electronically signed by Kendall Farias DO on 12/28/2020 at 11:13 PM   [address]  Dept: [phone]

## 2021-01-07 ENCOUNTER — HOSPITAL ENCOUNTER (OUTPATIENT)
Dept: MRI IMAGING | Age: 51
Discharge: HOME | End: 2021-01-09
Payer: COMMERCIAL

## 2021-01-07 DIAGNOSIS — M25.551 CHRONIC RIGHT HIP PAIN: ICD-10-CM

## 2021-01-07 DIAGNOSIS — G89.29 CHRONIC RIGHT HIP PAIN: ICD-10-CM

## 2021-01-07 PROCEDURE — 73721 MRI JNT OF LWR EXTRE W/O DYE: CPT

## 2021-01-08 ENCOUNTER — OFFICE VISIT (OUTPATIENT)
Dept: PAIN MANAGEMENT | Age: 51
End: 2021-01-08
Payer: COMMERCIAL

## 2021-01-08 VITALS
DIASTOLIC BLOOD PRESSURE: 78 MMHG | HEIGHT: 68 IN | TEMPERATURE: 98 F | HEART RATE: 84 BPM | WEIGHT: 185 LBS | SYSTOLIC BLOOD PRESSURE: 132 MMHG | OXYGEN SATURATION: 98 % | BODY MASS INDEX: 28.04 KG/M2

## 2021-01-08 DIAGNOSIS — M25.551 CHRONIC RIGHT HIP PAIN: ICD-10-CM

## 2021-01-08 DIAGNOSIS — I87.2 VENOUS INSUFFICIENCY: ICD-10-CM

## 2021-01-08 DIAGNOSIS — M70.61 TROCHANTERIC BURSITIS OF RIGHT HIP: Primary | ICD-10-CM

## 2021-01-08 DIAGNOSIS — G89.29 CHRONIC RIGHT HIP PAIN: ICD-10-CM

## 2021-01-08 DIAGNOSIS — E66.3 OVERWEIGHT: ICD-10-CM

## 2021-01-08 DIAGNOSIS — M76.01 GLUTEAL TENDINITIS OF RIGHT BUTTOCK: Primary | ICD-10-CM

## 2021-01-08 PROCEDURE — G8427 DOCREV CUR MEDS BY ELIG CLIN: HCPCS | Performed by: PHYSICAL MEDICINE & REHABILITATION

## 2021-01-08 PROCEDURE — 1036F TOBACCO NON-USER: CPT | Performed by: PHYSICAL MEDICINE & REHABILITATION

## 2021-01-08 PROCEDURE — G8484 FLU IMMUNIZE NO ADMIN: HCPCS | Performed by: PHYSICAL MEDICINE & REHABILITATION

## 2021-01-08 PROCEDURE — G8419 CALC BMI OUT NRM PARAM NOF/U: HCPCS | Performed by: PHYSICAL MEDICINE & REHABILITATION

## 2021-01-08 PROCEDURE — 99204 OFFICE O/P NEW MOD 45 MIN: CPT | Performed by: PHYSICAL MEDICINE & REHABILITATION

## 2021-01-08 RX ORDER — PHENTERMINE HYDROCHLORIDE 37.5 MG/1
37.5 TABLET ORAL
Qty: 30 TABLET | Refills: 0 | Status: SHIPPED | OUTPATIENT
Start: 2021-01-08 | End: 2021-02-08 | Stop reason: SDUPTHER

## 2021-01-08 RX ORDER — MELOXICAM 15 MG/1
15 TABLET ORAL DAILY
Qty: 30 TABLET | Refills: 3 | Status: SHIPPED | OUTPATIENT
Start: 2021-01-08 | End: 2021-01-08 | Stop reason: ALTCHOICE

## 2021-01-08 RX ORDER — NABUMETONE 750 MG/1
750 TABLET, FILM COATED ORAL 2 TIMES DAILY
Qty: 60 TABLET | Refills: 3 | Status: SHIPPED | OUTPATIENT
Start: 2021-01-08 | End: 2021-01-29

## 2021-01-08 RX ORDER — MELOXICAM 15 MG/1
15 TABLET ORAL DAILY
Qty: 30 TABLET | Refills: 3 | Status: SHIPPED | OUTPATIENT
Start: 2021-01-08 | End: 2021-01-22 | Stop reason: ALTCHOICE

## 2021-01-08 NOTE — TELEPHONE ENCOUNTER
Last OV: 12/28/2020  Last RX:    Next scheduled apt: Visit date not found    Patient called and requested Medication (Adipex)    Medication pending       Please Advise.

## 2021-01-08 NOTE — TELEPHONE ENCOUNTER
Pretty Wilkinson is calling for the results of her MRI she had done yesterday. She also said she needs a refill of her adipex. This was sent in on 12/28 but she was 8 days too soon and they would not fill the prescription. They did not keep the prescription and she needs a new one sent in for her. Please let Pretty Wilkinson know.     Dm-liz      Health Maintenance   Topic Date Due    Hepatitis C screen  1970    HIV screen  08/28/1987    Cervical cancer screen  08/28/1993    Flu vaccine (1) 09/01/2020    Lipid screen  11/07/2023    DTaP/Tdap/Td vaccine (2 - Td) 11/29/2028    Hepatitis A vaccine  Aged Out    Hepatitis B vaccine  Aged Out    Hib vaccine  Aged Out    Meningococcal (ACWY) vaccine  Aged Out    Pneumococcal 0-64 years Vaccine  Aged Out             (applicable per patient's age: Cancer Screenings, Depression Screening, Fall Risk Screening, Immunizations)    LDL Cholesterol (mg/dL)   Date Value   11/07/2018 116     AST (U/L)   Date Value   11/07/2018 19     ALT (U/L)   Date Value   11/07/2018 16     BUN (mg/dL)   Date Value   01/15/2019 12      (goal A1C is < 7)   (goal LDL is <100) need 30-50% reduction from baseline     BP Readings from Last 3 Encounters:   12/28/20 110/78   12/03/20 130/80   11/10/20 128/78    (goal /80)      All Future Testing planned in [facility]:  Lab Frequency Next Occurrence       Next Visit Date:  Future Appointments   Date Time Provider Reginaldo Echavarria   1/8/2021  1:15 PM Simeon Beebe MD [address]            Patient Active Problem List:     Anxiety     Insomnia     HTN (hypertension)     Venous insufficiency     Overweight

## 2021-01-08 NOTE — PROGRESS NOTES
FOLLOW UP APPOINTMENT:         Sherie De León MD    2021       Mauro Escobar is a 52 y.o. female, who came for a  new problem    Cause of the symptom(s): degenerative (arthritis)    Onset: 7months      Prior reatment done: physical therapy, anti-inflammatory medication and muscle relaxant    Current pain level: 7 today 10 at night on the scale of 0-10 ( 10 being worst)      Quality of Symptoms: aching and throbbing    Aggravating factors: sitting or lying for long periods of time    Relieving factors:elevation  ice packs, use of medication and injection        Allergies   Allergen Reactions    Vicodin [Hydrocodone-Acetaminophen] Nausea And Vomiting       Social History     Socioeconomic History    Marital status:      Spouse name: Not on file    Number of children: Not on file    Years of education: Not on file    Highest education level: Not on file   Occupational History    Not on file   Social Needs    Financial resource strain: Not on file    Food insecurity     Worry: Not on file     Inability: Not on file    Transportation needs     Medical: Not on file     Non-medical: Not on file   Tobacco Use    Smoking status: Former Smoker     Years: 6.00     Quit date: 2001     Years since quittin.7    Smokeless tobacco: Never Used   Substance and Sexual Activity    Alcohol use: No    Drug use: No    Sexual activity: Yes     Partners: Male   Lifestyle    Physical activity     Days per week: Not on file     Minutes per session: Not on file    Stress: Not on file   Relationships    Social connections     Talks on phone: Not on file     Gets together: Not on file     Attends [employer] service: Not on file     Active member of club or organization: Not on file     Attends meetings of clubs or organizations: Not on file     Relationship status: Not on file    Intimate partner violence     Fear of current or ex partner: Not on file     Emotionally abused: Not on file Physically abused: Not on file     Forced sexual activity: Not on file   Other Topics Concern    Not on file   Social History Narrative    Not on file       Past Medical History:   Diagnosis Date    Anxiety     Arthritis     Asthma     as child    Depression     Hypertension     Osteoarthritis     Ulcer     stomach    Wears glasses        Past Surgical History:   Procedure Laterality Date     SECTION      CHOLECYSTECTOMY, LAPAROSCOPIC N/A 2017    CHOLECYSTECTOMY LAPAROSCOPIC; photos performed by Юлия Franklin MD at [address] EXTRACTION         Family History   Problem Relation Age of Onset    Diabetes Mother     Cancer Mother         Liver Cancer    Heart Disease Father         Prior to Visit Medications    Medication Sig Taking? Authorizing Provider   DULoxetine (CYMBALTA) 60 MG extended release capsule Take 1 capsule by mouth daily Yes Kam , DO   gabapentin (NEURONTIN) 300 MG capsule Take 1 capsule by mouth 3 times daily for 90 days. Yes Kam , DO   diclofenac sodium (VOLTAREN) 1 % GEL Apply topically 2 times daily Yes Historical Provider, MD   Multiple Vitamins-Minerals (HAIR SKIN AND NAILS FORMULA PO) Take by mouth Yes Historical Provider, MD   TURMERIC PO Take by mouth Yes Historical Provider, MD   celecoxib (CELEBREX) 100 MG capsule take 1 capsule by mouth twice a day Yes Kam , DO   lidocaine (LIDODERM) 5 % Place 1 patch onto the skin daily 12 hours on, 12 hours off.  Yes Kam , DO   buPROPion (WELLBUTRIN XL) 150 MG extended release tablet take 1 tablet by mouth every morning Yes Pool Bee MD   FOLIC ACID PO Take 3,941 mg by mouth daily  Yes Historical Provider, MD   saline nasal gel (AYR) GEL by Nasal route as needed for Congestion Yes Kam , DO

## 2021-01-19 ENCOUNTER — TELEPHONE (OUTPATIENT)
Dept: PAIN MANAGEMENT | Age: 51
End: 2021-01-19

## 2021-01-19 NOTE — TELEPHONE ENCOUNTER
Health Maintenance   Topic Date Due    Hepatitis C screen  1970    HIV screen  08/28/1987    Cervical cancer screen  08/28/1993    Flu vaccine (1) 09/01/2020    Lipid screen  11/07/2023    DTaP/Tdap/Td vaccine (2 - Td) 11/29/2028    Hepatitis A vaccine  Aged Out    Hepatitis B vaccine  Aged Out    Hib vaccine  Aged Out    Meningococcal (ACWY) vaccine  Aged Out    Pneumococcal 0-64 years Vaccine  Aged Out             (applicable per patient's age: Cancer Screenings, Depression Screening, Fall Risk Screening, Immunizations)    LDL Cholesterol (mg/dL)   Date Value   11/07/2018 116     AST (U/L)   Date Value   11/07/2018 19     ALT (U/L)   Date Value   11/07/2018 16     BUN (mg/dL)   Date Value   01/15/2019 12      (goal A1C is < 7)   (goal LDL is <100) need 30-50% reduction from baseline     BP Readings from Last 3 Encounters:   01/08/21 132/78   12/28/20 110/78   12/03/20 130/80    (goal /80)      All Future Testing planned in [facility]:  Lab Frequency Next Occurrence       Next Visit Date:  Future Appointments   Date Time Provider Reginaldo Echavarria   1/22/2021 12:30 PM Silvino Montiel MD [address]   2/8/2021  2:40 PM DO [facility]            Patient Active Problem List:     Anxiety     Insomnia     HTN (hypertension)     Venous insufficiency     Overweight

## 2021-01-19 NOTE — TELEPHONE ENCOUNTER
Patient contacted the office today with  c/o trouble with recent medications. She was seen in the office on 01/08/2021 for Trochanteric bursitis of right hip. She was prescribed medications- Relafen 500 mg BID x 7 days and meloxicam (MOBIC) 15 MG. Patient contacted the office today c/o Upset Stomach, Gas,  Bloating, Heartburn, and Nausea. She wanted to make provider aware of her reaction to the medications. Informed the patient the provider would be advised. She voiced understanding. Patient explained she uses [facility] for pharmacy should anything else need to be sent in place of the medications. Patient is scheduled for follow up on Friday 01/22/2021.

## 2021-01-21 NOTE — TELEPHONE ENCOUNTER
Will hold off medication (anti inflammatory)  for now. Recommend protonix 40 mg q  day     Schedule for ff up appt in office for bursa injection.

## 2021-01-22 ENCOUNTER — TELEMEDICINE (OUTPATIENT)
Dept: PAIN MANAGEMENT | Age: 51
End: 2021-01-22
Payer: COMMERCIAL

## 2021-01-22 DIAGNOSIS — M70.61 TROCHANTERIC BURSITIS OF RIGHT HIP: Primary | ICD-10-CM

## 2021-01-22 PROCEDURE — G8428 CUR MEDS NOT DOCUMENT: HCPCS | Performed by: PHYSICAL MEDICINE & REHABILITATION

## 2021-01-22 PROCEDURE — 99213 OFFICE O/P EST LOW 20 MIN: CPT | Performed by: PHYSICAL MEDICINE & REHABILITATION

## 2021-01-22 RX ORDER — OMEPRAZOLE 40 MG/1
40 CAPSULE, DELAYED RELEASE ORAL
Qty: 60 CAPSULE | Refills: 0 | Status: SHIPPED | OUTPATIENT
Start: 2021-01-22 | End: 2021-03-22

## 2021-01-22 NOTE — PROGRESS NOTES
Emotionally abused: Not on file     Physically abused: Not on file     Forced sexual activity: Not on file   Other Topics Concern    Not on file   Social History Narrative    Not on file       Past Medical History:   Diagnosis Date    Anxiety     Arthritis     Asthma     as child    Depression     Hypertension     Osteoarthritis     Ulcer     stomach    Wears glasses        Past Surgical History:   Procedure Laterality Date     SECTION      CHOLECYSTECTOMY, LAPAROSCOPIC N/A 2017    CHOLECYSTECTOMY LAPAROSCOPIC; photos performed by Julio Cesar Delgado MD at [address] EXTRACTION         Family History   Problem Relation Age of Onset    Diabetes Mother     Cancer Mother         Liver Cancer    Heart Disease Father         Prior to Visit Medications    Medication Sig Taking? Authorizing Provider   phentermine (ADIPEX-P) 37.5 MG tablet Take 1 tablet by mouth every morning (before breakfast) for 30 days. Patient not taking: Reported on 2021  [employer], DO   nabumetone (RELAFEN) 750 MG tablet Take 1 tablet by mouth 2 times daily  Maricel Rogers MD   DULoxetine (CYMBALTA) 60 MG extended release capsule Take 1 capsule by mouth daily  [employer], DO   gabapentin (NEURONTIN) 300 MG capsule Take 1 capsule by mouth 3 times daily for 90 days. [employer], DO   diclofenac sodium (VOLTAREN) 1 % GEL Apply topically 2 times daily  Historical Provider, MD   Multiple Vitamins-Minerals (HAIR SKIN AND NAILS FORMULA PO) Take by mouth  Historical Provider, MD   TURMERIC PO Take by mouth  Historical Provider, MD   lidocaine (LIDODERM) 5 % Place 1 patch onto the skin daily 12 hours on, 12 hours off.   [employer], DO   buPROPion (WELLBUTRIN XL) 150 MG extended release tablet take 1 tablet by mouth every morning  Nicole Madrid MD   FOLIC ACID PO Take 5,193 mg by mouth daily   Historical Provider, MD fluticasone (FLONASE) 50 MCG/ACT nasal spray 1 spray by Nasal route daily for 7 days Dispose of after course is completed. DEANDRA Harrell - CNP   saline nasal gel (AYR) GEL by Nasal route as needed for Congestion  Vladislav Rizvi,    Vitamin D-Vitamin K (DOSOQUIN) 5500-200 UNIT-MCG TABS take 1 tablet by mouth daily  Historical Provider, MD   acetaminophen (TYLENOL) 325 MG tablet Take 650 mg by mouth every 6 hours as needed. Historical Provider, MD       Review of Systems  All systems reviewed, all unremarkable other than HPI/subjective. No change since last visit. Denies  fever, chills, infection or non healing wound. Physical Exam  Constitutional:       General: She is not in acute distress. HENT:      Head: Normocephalic. Pulmonary:      Effort: Pulmonary effort is normal. No respiratory distress. Neurological:      General: No focal deficit present. Mental Status: She is alert. Psychiatric:         Mood and Affect: Mood normal.           Assessment and Plan:      Diagnosis Orders   1. Trochanteric bursitis of right hip         Persistent pain over the greater trochanter, right side. Schedule for bursa injection for next week    Discontinue NSAIDS for now.      Prilosec 40 mg q day x 2 months Aruna Soto is a 52 y.o. female being evaluated by a Virtual Visit (video visit) encounter to address concerns as mentioned above. A caregiver was present when appropriate. Due to this being a TeleHealth encounter (During KKWG-85 public health emergency), evaluation of the following organ systems was limited: Vitals/Constitutional/EENT/Resp/CV/GI//MS/Neuro/Skin/Heme-Lymph-Imm. Pursuant to the emergency declaration under the [address] [place] [address] and the [employer] Act, this Virtual Visit was conducted with patient's (and/or legal guardian's) consent, to reduce the patient's risk of exposure to COVID-19 and provide necessary medical care. The patient (and/or legal guardian) has also been advised to contact this office for worsening conditions or problems, and seek emergency medical treatment and/or call 911 if deemed necessary. Patient identification was verified at the start of the visit: Yes    Total time spent for this encounter: Not billed by time    Services were provided through a video synchronous discussion virtually to substitute for in-person clinic visit. Patient and provider were located at their individual homes. --Rayleen Sacks, MD on 1/22/2021 at 12:05 PM    An electronic signature was used to authenticate this note.

## 2021-01-27 ENCOUNTER — TELEPHONE (OUTPATIENT)
Dept: FAMILY MEDICINE CLINIC | Age: 51
End: 2021-01-27

## 2021-01-27 RX ORDER — CITALOPRAM 40 MG/1
TABLET ORAL
Qty: 90 TABLET | Refills: 1 | Status: SHIPPED | OUTPATIENT
Start: 2021-01-27 | End: 2021-01-29 | Stop reason: ALTCHOICE

## 2021-01-27 NOTE — TELEPHONE ENCOUNTER
Ayesha Velasco left a message on the machine. She is wanting to know if she can go back on her celexa. She said she has been feeling really depressed lately. She has a check up scheduled for on 2/8/21. Please let Ayesha Velasco know.     Health Maintenance   Topic Date Due    Hepatitis C screen  1972    HIV screen  08/28/1987    Cervical cancer screen  08/28/1993    Flu vaccine (1) 09/01/2020    Lipid screen  11/07/2023    DTaP/Tdap/Td vaccine (2 - Td) 11/29/2028    Hepatitis A vaccine  Aged Out    Hepatitis B vaccine  Aged Out    Hib vaccine  Aged Out    Meningococcal (ACWY) vaccine  Aged Out    Pneumococcal 0-64 years Vaccine  Aged Out             (applicable per patient's age: Cancer Screenings, Depression Screening, Fall Risk Screening, Immunizations)    LDL Cholesterol (mg/dL)   Date Value   11/07/2018 116     AST (U/L)   Date Value   11/07/2018 19     ALT (U/L)   Date Value   11/07/2018 16     BUN (mg/dL)   Date Value   01/15/2019 12      (goal A1C is < 7)   (goal LDL is <100) need 30-50% reduction from baseline     BP Readings from Last 3 Encounters:   01/08/21 132/78   12/28/20 110/78   12/03/20 130/80    (goal /80)      All Future Testing planned in [facility]:  Lab Frequency Next Occurrence       Next Visit Date:  Future Appointments   Date Time Provider Reginaldo Echavarria   1/29/2021  2:00 PM Reinier Paige MD [address]   2/8/2021  2:40 PM DO [facility]            Patient Active Problem List:     Anxiety     Insomnia     HTN (hypertension)     Venous insufficiency     Overweight

## 2021-01-28 RX ORDER — BUPROPION HYDROCHLORIDE 150 MG/1
150 TABLET ORAL EVERY MORNING
Qty: 90 TABLET | Refills: 1 | Status: SHIPPED | OUTPATIENT
Start: 2021-01-28 | End: 2021-06-08

## 2021-01-29 ENCOUNTER — OFFICE VISIT (OUTPATIENT)
Dept: PAIN MANAGEMENT | Age: 51
End: 2021-01-29
Payer: COMMERCIAL

## 2021-01-29 VITALS
WEIGHT: 181 LBS | SYSTOLIC BLOOD PRESSURE: 138 MMHG | BODY MASS INDEX: 27.43 KG/M2 | DIASTOLIC BLOOD PRESSURE: 80 MMHG | OXYGEN SATURATION: 95 % | TEMPERATURE: 98.4 F | HEIGHT: 68 IN | HEART RATE: 89 BPM

## 2021-01-29 DIAGNOSIS — M70.61 TROCHANTERIC BURSITIS OF RIGHT HIP: Primary | ICD-10-CM

## 2021-01-29 PROCEDURE — 20610 DRAIN/INJ JOINT/BURSA W/O US: CPT | Performed by: PHYSICAL MEDICINE & REHABILITATION

## 2021-01-29 PROCEDURE — G8427 DOCREV CUR MEDS BY ELIG CLIN: HCPCS | Performed by: PHYSICAL MEDICINE & REHABILITATION

## 2021-01-29 PROCEDURE — G8484 FLU IMMUNIZE NO ADMIN: HCPCS | Performed by: PHYSICAL MEDICINE & REHABILITATION

## 2021-01-29 PROCEDURE — 99213 OFFICE O/P EST LOW 20 MIN: CPT | Performed by: PHYSICAL MEDICINE & REHABILITATION

## 2021-01-29 PROCEDURE — G8419 CALC BMI OUT NRM PARAM NOF/U: HCPCS | Performed by: PHYSICAL MEDICINE & REHABILITATION

## 2021-01-29 PROCEDURE — 1036F TOBACCO NON-USER: CPT | Performed by: PHYSICAL MEDICINE & REHABILITATION

## 2021-01-29 RX ORDER — MELOXICAM 15 MG/1
15 TABLET ORAL DAILY
COMMUNITY
End: 2021-01-29

## 2021-01-29 RX ORDER — METHYLPREDNISOLONE ACETATE 40 MG/ML
40 INJECTION, SUSPENSION INTRA-ARTICULAR; INTRALESIONAL; INTRAMUSCULAR; SOFT TISSUE ONCE
Status: COMPLETED | OUTPATIENT
Start: 2021-01-29 | End: 2021-01-29

## 2021-01-29 RX ADMIN — METHYLPREDNISOLONE ACETATE 40 MG: 40 INJECTION, SUSPENSION INTRA-ARTICULAR; INTRALESIONAL; INTRAMUSCULAR; SOFT TISSUE at 15:05

## 2021-01-29 NOTE — PATIENT INSTRUCTIONS
SURVEY:    You may be receiving a survey from [employer] regarding your visit today. Please complete the survey to enable us to provide the highest quality of care to you and your family. If you cannot score us a very good on any question, please call the office to discuss how we could have made your experience a very good one. Thank you.

## 2021-01-29 NOTE — PROGRESS NOTES
Forced sexual activity: Not on file   Other Topics Concern    Not on file   Social History Narrative    Not on file       Past Medical History:   Diagnosis Date    Anxiety     Arthritis     Asthma     as child    Depression     Hypertension     Osteoarthritis     Ulcer     stomach    Wears glasses        Past Surgical History:   Procedure Laterality Date     SECTION      CHOLECYSTECTOMY, LAPAROSCOPIC N/A 2017    CHOLECYSTECTOMY LAPAROSCOPIC; photos performed by Abdoul Simental MD at [address] EXTRACTION         Family History   Problem Relation Age of Onset    Diabetes Mother     Cancer Mother         Liver Cancer    Heart Disease Father         Prior to Visit Medications    Medication Sig Taking? Authorizing Provider   buPROPion (WELLBUTRIN XL) 150 MG extended release tablet take 1 tablet by mouth every morning Yes Duglas Camargo DO   omeprazole (PRILOSEC) 40 MG delayed release capsule Take 1 capsule by mouth every morning (before breakfast) Yes Floyd Austin MD   phentermine (ADIPEX-P) 37.5 MG tablet Take 1 tablet by mouth every morning (before breakfast) for 30 days. Yes Duglas Camargo DO   gabapentin (NEURONTIN) 300 MG capsule Take 1 capsule by mouth 3 times daily for 90 days. Yes Duglas Camargo DO   diclofenac sodium (VOLTAREN) 1 % GEL Apply topically 2 times daily Yes Historical Provider, MD   Multiple Vitamins-Minerals (HAIR SKIN AND NAILS FORMULA PO) Take by mouth Yes Historical Provider, MD   TURMERIC PO Take by mouth Yes Historical Provider, MD   lidocaine (LIDODERM) 5 % Place 1 patch onto the skin daily 12 hours on, 12 hours off.  Yes Duglas Camargo DO   FOLIC ACID PO Take 7,690 mg by mouth daily  Yes Historical Provider, MD   saline nasal gel (AYR) GEL by Nasal route as needed for Congestion Yes Duglas Camargo DO Vitamin D-Vitamin K (DOSOQUIN) 5500-200 UNIT-MCG TABS take 1 tablet by mouth daily Yes Historical Provider, MD   acetaminophen (TYLENOL) 325 MG tablet Take 650 mg by mouth every 6 hours as needed. Yes Historical Provider, MD   fluticasone (FLONASE) 50 MCG/ACT nasal spray 1 spray by Nasal route daily for 7 days Dispose of after course is completed. Genetta Castleman, APRN - CNP         Review of Systems      /80   Pulse 89   Temp 98.4 °F (36.9 °C) (Infrared)   Ht 5' 8\" (1.727 m)   Wt 181 lb (82.1 kg)   LMP 10/03/2017 (Exact Date)   SpO2 95%   Breastfeeding No   BMI 27.52 kg/m²       Physical Exam  HENT:      Head: Atraumatic. Pulmonary:      Effort: Pulmonary effort is normal. No respiratory distress. Musculoskeletal:         General: Tenderness present. Comments: Right greater trochanter    Neurological:      Mental Status: She is alert and oriented to person, place, and time. Psychiatric:         Behavior: Behavior normal.         Assessment and Plan:      Diagnosis Orders   1. Trochanteric bursitis of right hip         Objective goals for treatment: Evaluation and treatment      Risks benefits alternatives plan and personnel were discussed. Patient agreed to   Proceed. Procedure: Right Greater Trochanteric Bursa Injection    Sterile technique was done in the usual manner using chlorprep x 1     Using a 22 g, 1.5 inc needle, a solution of 40 mg of methylprednisolone and 0.25 % bupivacaine was injected. Post Injection Follow Up: 1. Check for bleeding, redness over the injection site. 2.Apply ice pack over injection site. 3.May remove the Band-Aid at home. Call or follow-up if the pain is worse or redness or discharge over the injection site. All questions were answered and imaging studies reviewed with the patient. I have reviewed the chief complaint and HPI including the [address] and Vital documentation by my staff and I agree with their documentation and have added where applicable. Time spent with patient was 15  minutes. More than 50% was spent counseling/coordinating the patient's care.          Kayla Brown MD   Spine Medicine/PM&R

## 2021-02-08 ENCOUNTER — OFFICE VISIT (OUTPATIENT)
Dept: FAMILY MEDICINE CLINIC | Age: 51
End: 2021-02-08
Payer: COMMERCIAL

## 2021-02-08 VITALS
HEIGHT: 68 IN | WEIGHT: 178 LBS | DIASTOLIC BLOOD PRESSURE: 70 MMHG | SYSTOLIC BLOOD PRESSURE: 134 MMHG | BODY MASS INDEX: 26.98 KG/M2 | HEART RATE: 82 BPM | OXYGEN SATURATION: 100 %

## 2021-02-08 DIAGNOSIS — M25.551 CHRONIC RIGHT HIP PAIN: Primary | ICD-10-CM

## 2021-02-08 DIAGNOSIS — G89.29 CHRONIC RIGHT HIP PAIN: Primary | ICD-10-CM

## 2021-02-08 DIAGNOSIS — I87.2 VENOUS INSUFFICIENCY: ICD-10-CM

## 2021-02-08 DIAGNOSIS — E66.3 OVERWEIGHT: ICD-10-CM

## 2021-02-08 DIAGNOSIS — F33.1 MODERATE EPISODE OF RECURRENT MAJOR DEPRESSIVE DISORDER (HCC): ICD-10-CM

## 2021-02-08 PROCEDURE — G8419 CALC BMI OUT NRM PARAM NOF/U: HCPCS | Performed by: FAMILY MEDICINE

## 2021-02-08 PROCEDURE — G8484 FLU IMMUNIZE NO ADMIN: HCPCS | Performed by: FAMILY MEDICINE

## 2021-02-08 PROCEDURE — 99214 OFFICE O/P EST MOD 30 MIN: CPT | Performed by: FAMILY MEDICINE

## 2021-02-08 PROCEDURE — G8427 DOCREV CUR MEDS BY ELIG CLIN: HCPCS | Performed by: FAMILY MEDICINE

## 2021-02-08 PROCEDURE — 1036F TOBACCO NON-USER: CPT | Performed by: FAMILY MEDICINE

## 2021-02-08 RX ORDER — CITALOPRAM 40 MG/1
TABLET ORAL
Qty: 90 TABLET | Refills: 1
Start: 2021-02-08 | End: 2021-06-08 | Stop reason: SDUPTHER

## 2021-02-08 RX ORDER — PHENTERMINE HYDROCHLORIDE 37.5 MG/1
37.5 TABLET ORAL
Qty: 30 TABLET | Refills: 0 | Status: SHIPPED | OUTPATIENT
Start: 2021-02-08 | End: 2021-03-08 | Stop reason: SDUPTHER

## 2021-02-08 ASSESSMENT — PATIENT HEALTH QUESTIONNAIRE - PHQ9
2. FEELING DOWN, DEPRESSED OR HOPELESS: 0
SUM OF ALL RESPONSES TO PHQ QUESTIONS 1-9: 0
SUM OF ALL RESPONSES TO PHQ9 QUESTIONS 1 & 2: 0

## 2021-02-08 NOTE — PROGRESS NOTES
Name: Sarah Clark  : 1970         Chief Complaint:     Chief Complaint   Patient presents with    Obesity       History of Present Illness:      Sarah Clark is a 52 y.o.  female who presents with Obesity      HPI     Depression had worsened when we changed from celexa to cymbalta. Changed back and is doing much better. Chronically feels sad and hopeless but again the medication helps greatly. No adverse effects to current regimen of Celexa and Wellbutrin. F/u overweight. Feels a lot better on adipex, more energy. Doing some extra activity and has a very active job. Having to work a lot of hours. Made positive changes with diet, eating a lot of veg, grapefruit, no bread or sweets. Drinking water, no diet pepsi. Right lateral hip pain greatly improved with pain management bursa injection. Still mild pain and is nervous to do too much with that hip, still feels she has some weakness from babying it for so long. No adverse effects to the injection. Plans to follow-up again with Dr. Álvaro Rush in April. Medications:       Prior to Admission medications    Medication Sig Start Date End Date Taking? Authorizing Provider   phentermine (ADIPEX-P) 37.5 MG tablet Take 1 tablet by mouth every morning (before breakfast) for 30 days. 2/8/21 3/10/21 Yes Wendi Rios DO   citalopram (CELEXA) 40 MG tablet take 1 tablet by mouth once daily 21  Yes Wendi Rios DO   buPROPion (WELLBUTRIN XL) 150 MG extended release tablet take 1 tablet by mouth every morning 21  Yes Wendi Rios DO   omeprazole (PRILOSEC) 40 MG delayed release capsule Take 1 capsule by mouth every morning (before breakfast) 21  Yes Marie Macias MD   gabapentin (NEURONTIN) 300 MG capsule Take 1 capsule by mouth 3 times daily for 90 days.  12/28/20 3/28/21 Yes Wendi Rios DO   diclofenac sodium (VOLTAREN) 1 % GEL Apply topically 2 times daily   Yes Historical Provider, MD Multiple Vitamins-Minerals (HAIR SKIN AND NAILS FORMULA PO) Take by mouth   Yes Historical Provider, MD   TURMERIC PO Take by mouth   Yes Historical Provider, MD   lidocaine (LIDODERM) 5 % Place 1 patch onto the skin daily 12 hours on, 12 hours off. 8/21/20  Yes Silva Garg DO   FOLIC ACID PO Take 4,384 mg by mouth daily    Yes Historical Provider, MD   fluticasone (FLONASE) 50 MCG/ACT nasal spray 1 spray by Nasal route daily for 7 days Dispose of after course is completed. 3/30/20 2/8/21 Yes DEANDRA Zhu CNP   saline nasal gel (AYR) GEL by Nasal route as needed for Congestion 5/31/19  Yes Silva Garg DO   Vitamin D-Vitamin K [facility]) 5500-200 UNIT-MCG TABS take 1 tablet by mouth daily 2/7/19  Yes Historical Provider, MD   acetaminophen (TYLENOL) 325 MG tablet Take 650 mg by mouth every 6 hours as needed. Yes Historical Provider, MD        Physical Exam:     Vitals:  /70   Pulse 82   Ht 5' 8\" (1.727 m)   Wt 178 lb (80.7 kg)   LMP 10/03/2017 (Exact Date)   SpO2 100%   BMI 27.06 kg/m²   Physical Exam  Vitals signs and nursing note reviewed. Constitutional:       General: She is not in acute distress. Appearance: Normal appearance. She is well-developed. She is not ill-appearing. Cardiovascular:      Rate and Rhythm: Normal rate and regular rhythm. Heart sounds: Normal heart sounds. Pulmonary:      Effort: Pulmonary effort is normal.      Breath sounds: Normal breath sounds. Neurological:      Mental Status: She is alert and oriented to person, place, and time. Psychiatric:         Mood and Affect: Mood normal.         Behavior: Behavior normal.         Assessment & Plan:        Diagnosis Orders   1. Chronic right hip pain  phentermine (ADIPEX-P) 37.5 MG tablet   2. Overweight  phentermine (ADIPEX-P) 37.5 MG tablet   3. Venous insufficiency  phentermine (ADIPEX-P) 37.5 MG tablet   4.  Moderate episode of recurrent major depressive disorder (HCC) Right hip pain greatly improved status post steroid injection. I had also performed a greater trochanteric bursa injection but for whatever reason this 1 worked better. Advised to start some gentle strengthening exercises at home, demonstrated leg lifts and posterior leg lifts in a standing position rather than lying down so that the movement is less challenging. Continue follow-up with pain management. Overweight which contributes to the hip pain and also to her chronic venous insufficiency and swelling. 7 pounds lost in the last month with use of Adipex and diet modification. Continue same. Follow-up 1 month. Depression had been uncontrolled with changing Celexa to Cymbalta, which we had tried to help with her chronic pain. Doing much better back on Celexa. Discussed chronic nature of depression and advised that we continue same dosing for the time being. In the future if depression is really well controlled and not in the winter, we could certainly consider very gradual dose reduction on the Celexa. Requested Prescriptions     Signed Prescriptions Disp Refills    phentermine (ADIPEX-P) 37.5 MG tablet 30 tablet 0     Sig: Take 1 tablet by mouth every morning (before breakfast) for 30 days.  citalopram (CELEXA) 40 MG tablet 90 tablet 1     Sig: take 1 tablet by mouth once daily       There are no Patient Instructions on file for this visit. Delmi Fuller received counseling on the following healthy behaviors: medication adherence  Reviewed prior labs and health maintenance. Continue current medications, diet and exercise. Discussed use, benefit, and side effects of prescribed medications. Barriers to medication compliance addressed. Patient given educational materials - see patient instructions. All patient questions answered. Patient voiced understanding.      Electronically signed by Min Rodriguez DO on 2/8/2021 at 5:43 PM   [facility] [address]  Dept: [phone]

## 2021-02-12 ENCOUNTER — TELEPHONE (OUTPATIENT)
Dept: PAIN MANAGEMENT | Age: 51
End: 2021-02-12

## 2021-02-12 DIAGNOSIS — M70.61 TROCHANTERIC BURSITIS OF RIGHT HIP: Primary | ICD-10-CM

## 2021-02-12 RX ORDER — TRAMADOL HYDROCHLORIDE 50 MG/1
50 TABLET ORAL EVERY 8 HOURS PRN
Qty: 21 TABLET | Refills: 0 | Status: SHIPPED | OUTPATIENT
Start: 2021-02-12 | End: 2021-02-19

## 2021-02-12 NOTE — TELEPHONE ENCOUNTER
Patient called the office stating her right hip  is hurting. When she stands up it is difficult to put weight on it. She said she put Biofreeze on it and that helped some what with the pain. She stated when she presses in on it helps with the pain. She says it is very painful and would like to know what the Provider recommends.

## 2021-03-01 ENCOUNTER — TELEPHONE (OUTPATIENT)
Dept: PAIN MANAGEMENT | Age: 51
End: 2021-03-01

## 2021-03-01 DIAGNOSIS — M70.61 TROCHANTERIC BURSITIS OF RIGHT HIP: Primary | ICD-10-CM

## 2021-03-01 RX ORDER — TRAMADOL HYDROCHLORIDE 50 MG/1
50 TABLET ORAL EVERY 8 HOURS PRN
Qty: 21 TABLET | Refills: 0 | Status: SHIPPED | OUTPATIENT
Start: 2021-03-01 | End: 2021-03-08

## 2021-03-01 NOTE — TELEPHONE ENCOUNTER
Pt is still having hip pain. She is out of Tramadol. She states the Tylenol or Ibuprofen are not helping.  Please advise    Health Maintenance   Topic Date Due    Hepatitis C screen  1972    HIV screen  08/28/1987    Cervical cancer screen  08/28/1993    Flu vaccine (1) 09/01/2020    Lipid screen  11/07/2023    DTaP/Tdap/Td vaccine (2 - Td) 11/29/2028    Hepatitis A vaccine  Aged Out    Hepatitis B vaccine  Aged Out    Hib vaccine  Aged Out    Meningococcal (ACWY) vaccine  Aged Out    Pneumococcal 0-64 years Vaccine  Aged Out             (applicable per patient's age: Cancer Screenings, Depression Screening, Fall Risk Screening, Immunizations)    LDL Cholesterol (mg/dL)   Date Value   11/07/2018 116     AST (U/L)   Date Value   11/07/2018 19     ALT (U/L)   Date Value   11/07/2018 16     BUN (mg/dL)   Date Value   01/15/2019 12      (goal A1C is < 7)   (goal LDL is <100) need 30-50% reduction from baseline     BP Readings from Last 3 Encounters:   02/08/21 134/70   01/29/21 138/80   01/08/21 132/78    (goal /80)      All Future Testing planned in [facility]:  Lab Frequency Next Occurrence       Next Visit Date:  Future Appointments   Date Time Provider Reginaldo Echavarria   3/8/2021  1:40 PM Cali JanuaryDO [facility]   4/30/2021 12:30 PM Enmanuel Sanderson MD [address]            Patient Active Problem List:     Anxiety     Insomnia     HTN (hypertension)     Venous insufficiency     Overweight

## 2021-03-02 NOTE — TELEPHONE ENCOUNTER
pls set a ff up appt for re eval and potentially an intraarticular hip injection instead of bursa injection.      Will send tramadol

## 2021-03-08 ENCOUNTER — OFFICE VISIT (OUTPATIENT)
Dept: FAMILY MEDICINE CLINIC | Age: 51
End: 2021-03-08
Payer: COMMERCIAL

## 2021-03-08 ENCOUNTER — HOSPITAL ENCOUNTER (OUTPATIENT)
Age: 51
Discharge: HOME | End: 2021-03-08
Payer: COMMERCIAL

## 2021-03-08 VITALS
SYSTOLIC BLOOD PRESSURE: 130 MMHG | OXYGEN SATURATION: 100 % | BODY MASS INDEX: 26.07 KG/M2 | HEIGHT: 68 IN | WEIGHT: 172 LBS | HEART RATE: 80 BPM | DIASTOLIC BLOOD PRESSURE: 80 MMHG

## 2021-03-08 DIAGNOSIS — M25.551 CHRONIC RIGHT HIP PAIN: ICD-10-CM

## 2021-03-08 DIAGNOSIS — G89.29 CHRONIC RIGHT HIP PAIN: ICD-10-CM

## 2021-03-08 DIAGNOSIS — E66.3 OVERWEIGHT: ICD-10-CM

## 2021-03-08 DIAGNOSIS — R45.86 MOOD SWINGS: ICD-10-CM

## 2021-03-08 DIAGNOSIS — Z13.6 SCREENING FOR CARDIOVASCULAR CONDITION: ICD-10-CM

## 2021-03-08 DIAGNOSIS — I87.2 VENOUS INSUFFICIENCY: ICD-10-CM

## 2021-03-08 DIAGNOSIS — R45.86 MOOD SWINGS: Primary | ICD-10-CM

## 2021-03-08 LAB
ABSOLUTE EOS #: 0.1 K/UL (ref 0–0.4)
ABSOLUTE IMMATURE GRANULOCYTE: NORMAL K/UL (ref 0–0.3)
ABSOLUTE LYMPH #: 2.2 K/UL (ref 1–4.8)
ABSOLUTE MONO #: 0.5 K/UL (ref 0–1)
ALBUMIN SERPL-MCNC: 4.7 G/DL (ref 3.5–5.2)
ALBUMIN/GLOBULIN RATIO: ABNORMAL (ref 1–2.5)
ALP BLD-CCNC: 89 U/L (ref 35–104)
ALT SERPL-CCNC: <5 U/L (ref 5–33)
ANION GAP SERPL CALCULATED.3IONS-SCNC: 10 MMOL/L (ref 9–17)
AST SERPL-CCNC: 19 U/L
BASOPHILS # BLD: 0 % (ref 0–2)
BASOPHILS ABSOLUTE: 0 K/UL (ref 0–0.2)
BILIRUB SERPL-MCNC: 0.45 MG/DL (ref 0.3–1.2)
BUN BLDV-MCNC: 7 MG/DL (ref 6–20)
BUN/CREAT BLD: 8 (ref 9–20)
CALCIUM SERPL-MCNC: 9.5 MG/DL (ref 8.6–10.4)
CHLORIDE BLD-SCNC: 100 MMOL/L (ref 98–107)
CHOLESTEROL/HDL RATIO: 3.6
CHOLESTEROL: 216 MG/DL
CO2: 28 MMOL/L (ref 20–31)
CREAT SERPL-MCNC: 0.85 MG/DL (ref 0.5–0.9)
DIFFERENTIAL TYPE: YES
EOSINOPHILS RELATIVE PERCENT: 1 % (ref 0–5)
GFR AFRICAN AMERICAN: >60 ML/MIN
GFR NON-AFRICAN AMERICAN: >60 ML/MIN
GFR SERPL CREATININE-BSD FRML MDRD: ABNORMAL ML/MIN/{1.73_M2}
GFR SERPL CREATININE-BSD FRML MDRD: ABNORMAL ML/MIN/{1.73_M2}
GLUCOSE BLD-MCNC: 96 MG/DL (ref 70–99)
HCT VFR BLD CALC: 39.3 % (ref 36–46)
HDLC SERPL-MCNC: 60 MG/DL
HEMOGLOBIN: 12.9 G/DL (ref 12–16)
IMMATURE GRANULOCYTES: NORMAL %
LDL CHOLESTEROL: 130 MG/DL (ref 0–130)
LYMPHOCYTES # BLD: 25 % (ref 15–40)
MCH RBC QN AUTO: 28.2 PG (ref 26–34)
MCHC RBC AUTO-ENTMCNC: 32.9 G/DL (ref 31–37)
MCV RBC AUTO: 85.8 FL (ref 80–100)
MONOCYTES # BLD: 6 % (ref 4–8)
NRBC AUTOMATED: NORMAL PER 100 WBC
PATIENT FASTING?: YES
PDW BLD-RTO: 13.6 % (ref 12.1–15.2)
PLATELET # BLD: 384 K/UL (ref 140–450)
PLATELET ESTIMATE: NORMAL
PMV BLD AUTO: NORMAL FL (ref 6–12)
POTASSIUM SERPL-SCNC: 3.7 MMOL/L (ref 3.7–5.3)
RBC # BLD: 4.58 M/UL (ref 4–5.2)
RBC # BLD: NORMAL 10*6/UL
SEG NEUTROPHILS: 68 % (ref 47–75)
SEGMENTED NEUTROPHILS ABSOLUTE COUNT: 5.9 K/UL (ref 2.5–7)
SODIUM BLD-SCNC: 138 MMOL/L (ref 135–144)
TOTAL PROTEIN: 7 G/DL (ref 6.4–8.3)
TRIGL SERPL-MCNC: 130 MG/DL
TSH SERPL DL<=0.05 MIU/L-ACNC: 1.28 MIU/L (ref 0.3–5)
VLDLC SERPL CALC-MCNC: ABNORMAL MG/DL (ref 1–30)
WBC # BLD: 8.7 K/UL (ref 3.5–11)
WBC # BLD: NORMAL 10*3/UL

## 2021-03-08 PROCEDURE — 1036F TOBACCO NON-USER: CPT | Performed by: FAMILY MEDICINE

## 2021-03-08 PROCEDURE — G8427 DOCREV CUR MEDS BY ELIG CLIN: HCPCS | Performed by: FAMILY MEDICINE

## 2021-03-08 PROCEDURE — 80053 COMPREHEN METABOLIC PANEL: CPT

## 2021-03-08 PROCEDURE — G8484 FLU IMMUNIZE NO ADMIN: HCPCS | Performed by: FAMILY MEDICINE

## 2021-03-08 PROCEDURE — 80061 LIPID PANEL: CPT

## 2021-03-08 PROCEDURE — 36415 COLL VENOUS BLD VENIPUNCTURE: CPT

## 2021-03-08 PROCEDURE — 99214 OFFICE O/P EST MOD 30 MIN: CPT | Performed by: FAMILY MEDICINE

## 2021-03-08 PROCEDURE — 85025 COMPLETE CBC W/AUTO DIFF WBC: CPT

## 2021-03-08 PROCEDURE — 84443 ASSAY THYROID STIM HORMONE: CPT

## 2021-03-08 PROCEDURE — G8419 CALC BMI OUT NRM PARAM NOF/U: HCPCS | Performed by: FAMILY MEDICINE

## 2021-03-08 RX ORDER — PHENTERMINE HYDROCHLORIDE 37.5 MG/1
37.5 TABLET ORAL
Qty: 30 TABLET | Refills: 0 | Status: SHIPPED | OUTPATIENT
Start: 2021-03-08 | End: 2021-08-24 | Stop reason: SDUPTHER

## 2021-03-08 RX ORDER — LAMOTRIGINE 25 MG/1
25 TABLET ORAL 2 TIMES DAILY
Qty: 60 TABLET | Refills: 0 | Status: SHIPPED | OUTPATIENT
Start: 2021-03-08 | End: 2021-04-12 | Stop reason: SDUPTHER

## 2021-03-08 NOTE — PATIENT INSTRUCTIONS
SURVEY:    You may be receiving a survey from [employer] regarding your visit today. You may get this in the mail, through your MyChart or in your email. Please complete the survey to enable us to provide the highest quality of care to you and your family. If you cannot score us as very good ( 5 Stars) on any question, please feel free to call the office to discuss how we could have made your experience exceptional.     Thank you.     Clinical Care Team:  DO Sudarshan Peters, [address] [place] [address] Team:  [address] [place] [address]

## 2021-03-08 NOTE — PROGRESS NOTES
Name: Abhay Lemon  : 1970         Chief Complaint:     Chief Complaint   Patient presents with    Obesity       History of Present Illness:      Abhay Lemon is a 52 y.o.  female who presents with Obesity      HPI    Started having R hip pain again, contacted Dr Jori Srivastava who put her on tramadol. Taking it at least once a day and it does seem to help. Remains on gabapentin also. [place] about Lyrica. H/o PUD and h pylori so she was taken off of nsaid's. Had also had h/o kidney failure during pregnancy. Ongoing trouble with mood, sometimes happy, at other times wants to cry all day. Has also had times of elevated mood, rapid speech, coming up with plans (thought of packing all of her stuff and moving to [facility] or [place], where she doesn't know anyone), not sleeping at all, up at 3AM wanting to do some activity or another. Typically lasts about a day. Has had this for many yrs, incl when she was  to ex-. 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 They are talking again and are trying to work things out. Had been arguing too much and having problems. Twitches in sleep. With regard to her weight, patient is happy with her weight loss on Adipex and also just feels really well on it, much better energy and happier. Continues to work on diet. Very active at work. Medical History:     Patient Active Problem List   Diagnosis    Anxiety    Insomnia    HTN (hypertension)    Venous insufficiency    Overweight       Medications:       Prior to Admission medications    Medication Sig Start Date End Date Taking? Authorizing Provider   phentermine (ADIPEX-P) 37.5 MG tablet Take 1 tablet by mouth every morning (before breakfast) for 30 days.  3/8/21 4/7/21 Yes Edgard Mittal, DO   lamoTRIgine (LAMICTAL) 25 MG tablet Take 1 tablet by mouth 2 times daily 3/8/21  Yes Edgard Mittal, DO   traMADol Valaria Jett) 50 MG tablet Take 1 tablet by mouth every 8 hours as needed for Pain for up to 7 days. Take lowest dose possible to manage pain 3/1/21 3/8/21  Arleene Fleischer, MD   citalopram (CELEXA) 40 MG tablet take 1 tablet by mouth once daily 2/8/21   Juan Antonio Meyer DO   buPROPion (WELLBUTRIN XL) 150 MG extended release tablet take 1 tablet by mouth every morning 1/28/21   Juan Antonio Meyer DO   omeprazole (PRILOSEC) 40 MG delayed release capsule Take 1 capsule by mouth every morning (before breakfast) 1/22/21   Arleene Fleischer, MD   gabapentin (NEURONTIN) 300 MG capsule Take 1 capsule by mouth 3 times daily for 90 days. 12/28/20 3/28/21  Juan Antonio Meyer DO   diclofenac sodium (VOLTAREN) 1 % GEL Apply topically 2 times daily    Historical Provider, MD   Multiple Vitamins-Minerals (HAIR SKIN AND NAILS FORMULA PO) Take by mouth    Historical Provider, MD   TURMERIC PO Take by mouth    Historical Provider, MD   lidocaine (LIDODERM) 5 % Place 1 patch onto the skin daily 12 hours on, 12 hours off. 8/21/20   Juan Antonio Meyer DO   FOLIC ACID PO Take 7,819 mg by mouth daily     Historical Provider, MD   fluticasone (FLONASE) 50 MCG/ACT nasal spray 1 spray by Nasal route daily for 7 days Dispose of after course is completed. 3/30/20 2/8/21  Christiane Liner, APRN - CNP   saline nasal gel (AYR) GEL by Nasal route as needed for Congestion 5/31/19   Juan Antonio Meyer DO   Vitamin D-Vitamin K [facility]) 5500-200 UNIT-MCG TABS take 1 tablet by mouth daily 2/7/19   Historical Provider, MD   acetaminophen (TYLENOL) 325 MG tablet Take 650 mg by mouth every 6 hours as needed.       Historical Provider, MD        Allergies:       Vicodin [hydrocodone-acetaminophen]    Review ofSystems:     Positive and Negative as described in HPI    Review of Systems    Physical Exam:     Vitals:  /80   Pulse 80   Ht 5' 8\" (1.727 m)   Wt 172 lb (78 kg)   LMP 10/03/2017 (Exact Date)   SpO2 100%   BMI 26.15 kg/m²   Physical Exam  Vitals signs and nursing note reviewed. Constitutional:       General: She is not in acute distress. Appearance: Normal appearance. She is well-developed. She is not ill-appearing. Cardiovascular:      Rate and Rhythm: Normal rate and regular rhythm. Heart sounds: Normal heart sounds. Pulmonary:      Effort: Pulmonary effort is normal.      Breath sounds: Normal breath sounds. Neurological:      Mental Status: She is alert and oriented to person, place, and time. Psychiatric:         Attention and Perception: Attention normal.         Mood and Affect: Mood normal.         Speech: Speech normal. Speech is not rapid and pressured. Behavior: Behavior normal.         Thought Content: Thought content normal.         Judgment: Judgment normal.         Data:     Lab Results   Component Value Date     01/15/2019    K 4.3 01/15/2019     01/15/2019    CO2 27 01/15/2019    BUN 12 01/15/2019    CREATININE 0.84 01/15/2019    GLUCOSE 93 01/15/2019    PROT 7.1 11/07/2018    LABALBU 4.0 11/07/2018    BILITOT 0.25 11/07/2018    ALKPHOS 91 11/07/2018    AST 19 11/07/2018    ALT 16 11/07/2018     Lab Results   Component Value Date    WBC 6.9 05/31/2019    RBC 4.37 05/31/2019    HGB 12.7 05/31/2019    HCT 38.0 05/31/2019    MCV 86.9 05/31/2019    MCH 29.1 05/31/2019    MCHC 33.5 05/31/2019    RDW 13.5 05/31/2019     05/31/2019    MPV NOT REPORTED 05/31/2019     Lab Results   Component Value Date    TSH 3.13 05/31/2019     Lab Results   Component Value Date    CHOL 216 11/07/2018    HDL 76 11/07/2018         Assessment & Plan:        Diagnosis Orders   1. Mood swings  TSH with Reflex    lamoTRIgine (LAMICTAL) 25 MG tablet   2. Chronic right hip pain  phentermine (ADIPEX-P) 37.5 MG tablet    CBC Auto Differential   3. Overweight  phentermine (ADIPEX-P) 37.5 MG tablet    TSH with Reflex   4. Venous insufficiency  phentermine (ADIPEX-P) 37.5 MG tablet   5.  Screening for cardiovascular condition Comprehensive Metabolic Panel    Lipid Panel   mood swings in pt with chronic depression, reports the mood swings have been longstanding also. Sounds as though she may be having hypomanic spells (shorter duration and lower severity than true manic spells). Trial of lamictal in addition to celexa and wellbutrin. F/u 1 mo. adipex refilled for overweight with comorbidities. Cont f/u with pain mgmt for chronic R lateral hip pain. Unfortunately pain recurred after the steroid injection. Taking tramadol. Discussed nsaid's and I advised I believe pt could use celebrex with monitoring of side effects and renal function. Updating labs. Has upcoming appt with pain mgmt. Requested Prescriptions     Signed Prescriptions Disp Refills    phentermine (ADIPEX-P) 37.5 MG tablet 30 tablet 0     Sig: Take 1 tablet by mouth every morning (before breakfast) for 30 days.  lamoTRIgine (LAMICTAL) 25 MG tablet 60 tablet 0     Sig: Take 1 tablet by mouth 2 times daily         Patient Instructions   SURVEY:    You may be receiving a survey from [employer] regarding your visit today. You may get this in the mail, through your MyChart or in your email. Please complete the survey to enable us to provide the highest quality of care to you and your family. If you cannot score us as very good ( 5 Stars) on any question, please feel free to call the office to discuss how we could have made your experience exceptional.     Thank you. Clinical Care Team:  Dr. Juan Antonio Meyer, DO [facility] [place], [address] [place] [address] Team:  Wily Paez received counseling on the following healthy behaviors: medication adherence  Reviewed prior labs and health maintenance. Continue current medications, diet and exercise. Discussed use, benefit, and side effects of prescribed medications.  Barriers to medication compliance addressed. Patient given educational materials - see patient instructions. All patient questions answered. Patient voiced understanding.        Electronically signed by Trevor Mcginnis DO on 3/8/2021 at 2:26 PM  [address]  Dept: [phone]

## 2021-03-12 ENCOUNTER — TELEMEDICINE (OUTPATIENT)
Dept: PAIN MANAGEMENT | Age: 51
End: 2021-03-12

## 2021-03-12 DIAGNOSIS — M70.61 TROCHANTERIC BURSITIS OF RIGHT HIP: Primary | ICD-10-CM

## 2021-03-15 ENCOUNTER — TELEPHONE (OUTPATIENT)
Dept: PAIN MANAGEMENT | Age: 51
End: 2021-03-15

## 2021-03-22 RX ORDER — OMEPRAZOLE 40 MG/1
CAPSULE, DELAYED RELEASE ORAL
Qty: 60 CAPSULE | Refills: 0 | Status: SHIPPED | OUTPATIENT
Start: 2021-03-22 | End: 2021-05-21

## 2021-04-09 ENCOUNTER — TELEMEDICINE (OUTPATIENT)
Dept: PAIN MANAGEMENT | Age: 51
End: 2021-04-09
Payer: COMMERCIAL

## 2021-04-09 DIAGNOSIS — M54.16 LUMBAR RADICULOPATHY: Primary | ICD-10-CM

## 2021-04-09 PROCEDURE — 99213 OFFICE O/P EST LOW 20 MIN: CPT | Performed by: PHYSICAL MEDICINE & REHABILITATION

## 2021-04-09 PROCEDURE — G8428 CUR MEDS NOT DOCUMENT: HCPCS | Performed by: PHYSICAL MEDICINE & REHABILITATION

## 2021-04-09 RX ORDER — TRAMADOL HYDROCHLORIDE 50 MG/1
50 TABLET ORAL EVERY 8 HOURS PRN
Qty: 21 TABLET | Refills: 0 | Status: SHIPPED | OUTPATIENT
Start: 2021-04-09 | End: 2021-04-16

## 2021-04-12 ENCOUNTER — OFFICE VISIT (OUTPATIENT)
Dept: FAMILY MEDICINE CLINIC | Age: 51
End: 2021-04-12
Payer: COMMERCIAL

## 2021-04-12 VITALS
HEART RATE: 81 BPM | DIASTOLIC BLOOD PRESSURE: 86 MMHG | HEIGHT: 68 IN | SYSTOLIC BLOOD PRESSURE: 136 MMHG | WEIGHT: 174 LBS | BODY MASS INDEX: 26.37 KG/M2 | OXYGEN SATURATION: 98 %

## 2021-04-12 DIAGNOSIS — N94.10 DYSPAREUNIA, FEMALE: ICD-10-CM

## 2021-04-12 DIAGNOSIS — R45.86 MOOD SWINGS: Primary | ICD-10-CM

## 2021-04-12 DIAGNOSIS — G25.81 RESTLESS LEG: ICD-10-CM

## 2021-04-12 DIAGNOSIS — G89.29 CHRONIC RIGHT HIP PAIN: ICD-10-CM

## 2021-04-12 DIAGNOSIS — M25.551 CHRONIC RIGHT HIP PAIN: ICD-10-CM

## 2021-04-12 PROCEDURE — G8419 CALC BMI OUT NRM PARAM NOF/U: HCPCS | Performed by: FAMILY MEDICINE

## 2021-04-12 PROCEDURE — 99214 OFFICE O/P EST MOD 30 MIN: CPT | Performed by: FAMILY MEDICINE

## 2021-04-12 PROCEDURE — G8427 DOCREV CUR MEDS BY ELIG CLIN: HCPCS | Performed by: FAMILY MEDICINE

## 2021-04-12 PROCEDURE — 1036F TOBACCO NON-USER: CPT | Performed by: FAMILY MEDICINE

## 2021-04-12 RX ORDER — LAMOTRIGINE 25 MG/1
25 TABLET ORAL 2 TIMES DAILY
Qty: 60 TABLET | Refills: 2 | Status: SHIPPED | OUTPATIENT
Start: 2021-04-12 | End: 2021-07-06

## 2021-04-12 RX ORDER — ESTRADIOL 0.1 MG/G
CREAM VAGINAL
COMMUNITY
Start: 2021-03-18

## 2021-04-12 RX ORDER — GABAPENTIN 300 MG/1
CAPSULE ORAL
Qty: 120 CAPSULE | Refills: 2 | Status: SHIPPED | OUTPATIENT
Start: 2021-04-12 | End: 2021-08-24 | Stop reason: SDUPTHER

## 2021-04-12 RX ORDER — DULOXETIN HYDROCHLORIDE 60 MG/1
CAPSULE, DELAYED RELEASE ORAL
COMMUNITY
Start: 2021-02-24 | End: 2021-04-12 | Stop reason: ALTCHOICE

## 2021-04-12 RX ORDER — CLOBETASOL PROPIONATE 0.5 MG/G
CREAM TOPICAL
COMMUNITY
Start: 2021-03-18

## 2021-04-12 NOTE — PROGRESS NOTES
Name: Kayla Borja  : 1970         Chief Complaint:     Chief Complaint   Patient presents with    Hypertension    Anxiety       History of Present Illness:      Kayla Borja is a 52 y.o.  female who presents with Hypertension and Anxiety      HPI    F/u depression and mood swings. Marked improvement since about 5 days after starting lamictal 25 mg BID 3/8. Continues to have severe pain in R hip. Tramadol helping with pain, ibuprofen, tylenol, no celebrex. Doubts gabapentin helping since she is still having to take everything else. Only takes it BID. Does feel it wear off around 1-2PM.     Continues to have chronic fatigue. Doesn't sleep well, at most deep sleep for 1:45. Up and down all night, thinks multifactorial with R lateral hip pain (can't lie on that side), having to urinate, etc. No PND or snoring. Does have legs moving all around and jerking at night, bothers boyfriend. Vaginal dryness, dyspareunia, anorgasmia, poor sex drive for approx past 2 mos. Medical History:     Patient Active Problem List   Diagnosis    Anxiety    Insomnia    HTN (hypertension)    Venous insufficiency    Overweight       Medications:       Prior to Admission medications    Medication Sig Start Date End Date Taking? Authorizing Provider   lamoTRIgine (LAMICTAL) 25 MG tablet Take 1 tablet by mouth 2 times daily 21  Yes Niels Mistry DO   gabapentin (NEURONTIN) 300 MG capsule 300 mg every morning, 300 mg every afternoon, 600 mg every evening at bedtime 21 Yes Niels Mistry DO   clobetasol (TEMOVATE) 0.05 % cream apply to affected area twice a day for 30 DAYS then if needed 3/18/21   Historical Provider, MD   estradiol (ESTRACE) 0.1 MG/GM vaginal cream apply ONE PEA SIZE DOLLOP ON EXTERNAL VAGINAL AREA AND SWIPE INTO. ..  (REFER TO PRESCRIPTION NOTES).  3/18/21   Historical Provider, MD   traMADol (ULTRAM) 50 MG tablet Take 1 tablet by mouth every 8 hours as needed for Pain for up to 7 days. 4/9/21 4/16/21  Hai Quintanilla MD   omeprazole (PRILOSEC) 40 MG delayed release capsule take 1 capsule by mouth every morning before breakfast 3/22/21   Hai Quintanilla MD   citalopram (CELEXA) 40 MG tablet take 1 tablet by mouth once daily 2/8/21   Becki Salmon DO   buPROPion (WELLBUTRIN XL) 150 MG extended release tablet take 1 tablet by mouth every morning 1/28/21   Becki Salmon DO   diclofenac sodium (VOLTAREN) 1 % GEL Apply topically 2 times daily    Historical Provider, MD   Multiple Vitamins-Minerals (HAIR SKIN AND NAILS FORMULA PO) Take by mouth    Historical Provider, MD   TURMERIC PO Take by mouth    Historical Provider, MD   lidocaine (LIDODERM) 5 % Place 1 patch onto the skin daily 12 hours on, 12 hours off. 8/21/20   Becki Salmon DO   FOLIC ACID PO Take 9,427 mg by mouth daily     Historical Provider, MD   fluticasone (FLONASE) 50 MCG/ACT nasal spray 1 spray by Nasal route daily for 7 days Dispose of after course is completed. 3/30/20 2/8/21  Gearold Meals, APRN - CNP   saline nasal gel (AYR) GEL by Nasal route as needed for Congestion 5/31/19   Becki Salmon DO   Vitamin D-Vitamin K [facility]) 5500-200 UNIT-MCG TABS take 1 tablet by mouth daily 2/7/19   Historical Provider, MD   acetaminophen (TYLENOL) 325 MG tablet Take 650 mg by mouth every 6 hours as needed. Historical Provider, MD        Allergies:       Vicodin [hydrocodone-acetaminophen]    Review ofSystems:     Positive and Negative as described in HPI    Review of Systems    Physical Exam:     Vitals:  /86   Pulse 81   Ht 5' 8\" (1.727 m)   Wt 174 lb (78.9 kg)   LMP 10/03/2017 (Exact Date)   SpO2 98%   BMI 26.46 kg/m²   Physical Exam  Constitutional:       Appearance: Normal appearance. She is not ill-appearing.    Psychiatric:         Mood and Affect: Mood normal.         Behavior: Behavior normal.         Data:     Lab Results   Component Value Date     03/08/2021    K 3.7 03/08/2021     03/08/2021    CO2 28 03/08/2021    BUN 7 03/08/2021    CREATININE 0.85 03/08/2021    GLUCOSE 96 03/08/2021    PROT 7.0 03/08/2021    LABALBU 4.7 03/08/2021    BILITOT 0.45 03/08/2021    ALKPHOS 89 03/08/2021    AST 19 03/08/2021    ALT <5 03/08/2021     Lab Results   Component Value Date    WBC 8.7 03/08/2021    RBC 4.58 03/08/2021    HGB 12.9 03/08/2021    HCT 39.3 03/08/2021    MCV 85.8 03/08/2021    MCH 28.2 03/08/2021    MCHC 32.9 03/08/2021    RDW 13.6 03/08/2021     03/08/2021    MPV NOT REPORTED 03/08/2021     Lab Results   Component Value Date    TSH 1.28 03/08/2021     Lab Results   Component Value Date    CHOL 216 03/08/2021    HDL 60 03/08/2021         Assessment & Plan:        Diagnosis Orders   1. Mood swings  lamoTRIgine (LAMICTAL) 25 MG tablet   2. Dyspareunia, female     3. Chronic right hip pain     4. Restless leg     1. Mood swings greatly improved with addition of Lamictal.  Patient may have bipolar type 2. Continue same dosing. She wondered about getting off of some of her depression pills as she is also on Celexa and Wellbutrin, but I advised that we keep those at same dosing for the time being as its only been a month. Follow-up 3 months. 2. Recent onset of dyspareunia, also anorgasmia and poor sex drive. Patient and boyfriend had been through some relationship difficulties recently which may be contributing. She saw gynecology and had an exam.  When I asked patient about lichen sclerosus she said it sounded familiar, thought that the gynecology NP may have mentioned that to her. I advised that she do go ahead and use the clobetasol that was prescribed, reassured about safety. If things do not improve with a month of twice daily dosing, follow-up with gynecology again as she may need another exam, possible biopsy. Discussed the effects of relationship trouble, depression, and medications on libido.   Advised Celexa could cause anorgasmia, but she has been on the Celexa for quite a long time and this symptom is new. Listed in less than 1% on Lamictal.  Advised to give things more time, try to work on the dyspareunia with the clobetasol, and we may adjust meds in the future if needed. 3. Chronic right hip pain, has had hip MRI, bursitis, did not respond adequately to physical therapy or greater trochanteric bursa injection. She does have known lumbar disc disease and I think the plan for epidural steroid injection is a solid 1. Discussed. 4.  Restless legs, increasing gabapentin dosing so she is taking it 3 times a day including a 600 mg dose at night. I am reluctant to add Mirapex or Requip as patient is already on so many meds that affect the nervous system. Did advise of potential increase in seizure risk with use of tramadol with Wellbutrin, but essentially she is on 2 antiseizure meds also. Follow-up 3 months. Requested Prescriptions     Signed Prescriptions Disp Refills    lamoTRIgine (LAMICTAL) 25 MG tablet 60 tablet 2     Sig: Take 1 tablet by mouth 2 times daily    gabapentin (NEURONTIN) 300 MG capsule 120 capsule 2     Si mg every morning, 300 mg every afternoon, 600 mg every evening at bedtime         There are no Patient Instructions on file for this visit. Clinton Suresh received counseling on the following healthy behaviors: medication adherence  Reviewed prior labs and health maintenance. Continue current medications, diet and exercise. Discussed use, benefit, and side effects of prescribed medications. Barriers to medication compliance addressed. Patient given educational materials - see patient instructions. All patient questions answered. Patient voiced understanding.        Electronically signed by Hermelindo Chambers DO on 2021 at 5:41 PM  [address]  Dept: [phone]

## 2021-05-07 ENCOUNTER — APPOINTMENT (OUTPATIENT)
Dept: GENERAL RADIOLOGY | Age: 51
End: 2021-05-07
Attending: PHYSICAL MEDICINE & REHABILITATION
Payer: COMMERCIAL

## 2021-05-07 ENCOUNTER — HOSPITAL ENCOUNTER (OUTPATIENT)
Dept: PREADMISSION TESTING | Age: 51
Setting detail: SPECIMEN
Discharge: HOME | End: 2021-05-07
Payer: COMMERCIAL

## 2021-05-07 ENCOUNTER — HOSPITAL ENCOUNTER (OUTPATIENT)
Age: 51
Setting detail: OUTPATIENT SURGERY
Discharge: HOME | End: 2021-05-07
Attending: PHYSICAL MEDICINE & REHABILITATION | Admitting: PHYSICAL MEDICINE & REHABILITATION
Payer: COMMERCIAL

## 2021-05-07 VITALS
HEIGHT: 68 IN | OXYGEN SATURATION: 100 % | HEART RATE: 68 BPM | RESPIRATION RATE: 16 BRPM | DIASTOLIC BLOOD PRESSURE: 72 MMHG | BODY MASS INDEX: 27.11 KG/M2 | WEIGHT: 178.9 LBS | SYSTOLIC BLOOD PRESSURE: 139 MMHG | TEMPERATURE: 97.9 F

## 2021-05-07 LAB
SARS-COV-2, RAPID: NOT DETECTED
SPECIMEN DESCRIPTION: NORMAL

## 2021-05-07 PROCEDURE — 76000 FLUOROSCOPY <1 HR PHYS/QHP: CPT

## 2021-05-07 PROCEDURE — 2709999900 HC NON-CHARGEABLE SUPPLY: Performed by: PHYSICAL MEDICINE & REHABILITATION

## 2021-05-07 PROCEDURE — 2500000003 HC RX 250 WO HCPCS: Performed by: PHYSICAL MEDICINE & REHABILITATION

## 2021-05-07 PROCEDURE — 3600000055 HC PAIN LEVEL 3 ADDL 15 MIN: Performed by: PHYSICAL MEDICINE & REHABILITATION

## 2021-05-07 PROCEDURE — 3600000054 HC PAIN LEVEL 3 BASE: Performed by: PHYSICAL MEDICINE & REHABILITATION

## 2021-05-07 PROCEDURE — 99152 MOD SED SAME PHYS/QHP 5/>YRS: CPT | Performed by: PHYSICAL MEDICINE & REHABILITATION

## 2021-05-07 PROCEDURE — 7100000010 HC PHASE II RECOVERY - FIRST 15 MIN: Performed by: PHYSICAL MEDICINE & REHABILITATION

## 2021-05-07 PROCEDURE — 62323 NJX INTERLAMINAR LMBR/SAC: CPT | Performed by: PHYSICAL MEDICINE & REHABILITATION

## 2021-05-07 PROCEDURE — C9803 HOPD COVID-19 SPEC COLLECT: HCPCS

## 2021-05-07 PROCEDURE — 87635 SARS-COV-2 COVID-19 AMP PRB: CPT

## 2021-05-07 PROCEDURE — 6360000002 HC RX W HCPCS: Performed by: PHYSICAL MEDICINE & REHABILITATION

## 2021-05-07 PROCEDURE — 7100000011 HC PHASE II RECOVERY - ADDTL 15 MIN: Performed by: PHYSICAL MEDICINE & REHABILITATION

## 2021-05-07 RX ORDER — MIDAZOLAM HYDROCHLORIDE 1 MG/ML
INJECTION INTRAMUSCULAR; INTRAVENOUS PRN
Status: DISCONTINUED | OUTPATIENT
Start: 2021-05-07 | End: 2021-05-07 | Stop reason: ALTCHOICE

## 2021-05-07 RX ORDER — FENTANYL CITRATE 50 UG/ML
INJECTION, SOLUTION INTRAMUSCULAR; INTRAVENOUS PRN
Status: DISCONTINUED | OUTPATIENT
Start: 2021-05-07 | End: 2021-05-07 | Stop reason: ALTCHOICE

## 2021-05-07 RX ORDER — BETAMETHASONE SODIUM PHOSPHATE AND BETAMETHASONE ACETATE 3; 3 MG/ML; MG/ML
INJECTION, SUSPENSION INTRA-ARTICULAR; INTRALESIONAL; INTRAMUSCULAR; SOFT TISSUE PRN
Status: DISCONTINUED | OUTPATIENT
Start: 2021-05-07 | End: 2021-05-07 | Stop reason: ALTCHOICE

## 2021-05-07 RX ORDER — LIDOCAINE HYDROCHLORIDE 10 MG/ML
INJECTION, SOLUTION INFILTRATION; PERINEURAL PRN
Status: DISCONTINUED | OUTPATIENT
Start: 2021-05-07 | End: 2021-05-07 | Stop reason: ALTCHOICE

## 2021-05-07 ASSESSMENT — PAIN - FUNCTIONAL ASSESSMENT: PAIN_FUNCTIONAL_ASSESSMENT: 0-10

## 2021-05-07 ASSESSMENT — PAIN SCALES - GENERAL: PAINLEVEL_OUTOF10: 0

## 2021-05-07 NOTE — OP NOTE
Operative Note      Patient: Lyric Garzon  YOB: 1970  MRN: [id number]    Date of Procedure: 5/7/2021    Pre-Op Diagnosis: LUMBAR RADICULOPATHY    Post-Op Diagnosis: Same       Procedure(s):  L 4-5 RIGHT INTERLAMINAR EPIDURAL INJECTION-  IMAGING GUIDANCE    Surgeon(s):  Charla Henry MD    Assistant:   * No surgical staff found *    Anesthesia: IV Sedation    Estimated Blood Loss (mL): Minimal    Complications: None    CONSENT:     The risks, benefits, alternatives, plan, complications, and personnel were   Discussed prior to the procedure. The patient understood and agreed to proceed. Anesthesia: Moderate sedation using 2 mg IV versed & 50 mcg IV fentanyl. The patient was positioned prone. Sign-in and time-out was performed. Identifying the   site, in this case, right side lumbar paramedian, at  L4-5 interspace. Sterile technique was done in the usual manner using chlorhexidine. This was followed by infiltration of a 9 ml of 1% preservative-free lidocaine. A 5 inch  22-gauge Tuohey needle was positioned under fluoroscopic guidance. Upon confirmation that the needle was properly positioned using loss-of-resistance technique, 0.5 cc of 240 mg of Omnipaque was injected. This was followed by injecting solution of 12 mg betamethasone and  2 ml of 1% preservative-free lidocaine was injected without difficulty. Moderate Sedation Time: > 15 minutes with a nurse administering the medication(s) under my supervision. The patient was independently monitored by a Registered Nurse assigned to the Procedure Room ( automated blood pressure, continuous EKG, Capnography and continuous pulse oximetry)      The patient tolerated the procedure well and went to the recovery room with stable vital signs. PLAN:     Home instructions were provided. The patient will follow up in 4 to 6 weeks or earlier if needed.        Electronically signed by Charla Henry MD on 5/7/2021 at 9:01 AM

## 2021-05-07 NOTE — PROGRESS NOTES

## 2021-05-07 NOTE — H&P
Neena Raya MD      Katt Adams is a 52 y.o. female, who came in for a procedure,  Right L4-5 interlaminar epidural injection. No change since last visit.        Treatment done: physical therapy, anti-inflammatory medication and muscle relaxant    Allergies   Allergen Reactions    Vicodin [Hydrocodone-Acetaminophen] Nausea And Vomiting       Social History     Socioeconomic History    Marital status:      Spouse name: Not on file    Number of children: Not on file    Years of education: Not on file    Highest education level: Not on file   Occupational History    Not on file   Social Needs    Financial resource strain: Not on file    Food insecurity     Worry: Not on file     Inability: Not on file    Transportation needs     Medical: Not on file     Non-medical: Not on file   Tobacco Use    Smoking status: Former Smoker     Years: 6.00     Quit date: 2001     Years since quittin.0    Smokeless tobacco: Never Used   Substance and Sexual Activity    Alcohol use: No    Drug use: No    Sexual activity: Yes     Partners: Male   Lifestyle    Physical activity     Days per week: Not on file     Minutes per session: Not on file    Stress: Not on file   Relationships    Social connections     Talks on phone: Not on file     Gets together: Not on file     Attends [employer] service: Not on file     Active member of club or organization: Not on file     Attends meetings of clubs or organizations: Not on file     Relationship status: Not on file    Intimate partner violence     Fear of current or ex partner: Not on file     Emotionally abused: Not on file     Physically abused: Not on file     Forced sexual activity: Not on file   Other Topics Concern    Not on file   Social History Narrative    Not on file       Past Medical History:   Diagnosis Date    Anxiety     Arthritis     Asthma     as child    Depression     Hypertension     Osteoarthritis     Ulcer stomach    Wears glasses        Past Surgical History:   Procedure Laterality Date     SECTION      CHOLECYSTECTOMY, LAPAROSCOPIC N/A 2017    CHOLECYSTECTOMY LAPAROSCOPIC; photos performed by Luisa Lopez MD at [address] [place] [address]         Prior to Visit Medications    Medication Sig Taking? Authorizing Provider   clobetasol (TEMOVATE) 0.05 % cream apply to affected area twice a day for 30 DAYS then if needed Yes Historical Provider, MD   estradiol (ESTRACE) 0.1 MG/GM vaginal cream apply ONE PEA SIZE DOLLOP ON EXTERNAL VAGINAL AREA AND SWIPE INTO. ..  (REFER TO PRESCRIPTION NOTES). Yes Historical Provider, MD   lamoTRIgine (LAMICTAL) 25 MG tablet Take 1 tablet by mouth 2 times daily Yes Alvino Swenson, DO   gabapentin (NEURONTIN) 300 MG capsule 300 mg every morning, 300 mg every afternoon, 600 mg every evening at bedtime Yes Alvino Swenson, DO   omeprazole (PRILOSEC) 40 MG delayed release capsule take 1 capsule by mouth every morning before breakfast Yes Katja Agee MD   citalopram (CELEXA) 40 MG tablet take 1 tablet by mouth once daily Yes Alvino Swenson, DO   buPROPion (WELLBUTRIN XL) 150 MG extended release tablet take 1 tablet by mouth every morning Yes Alvino Swenson, DO   diclofenac sodium (VOLTAREN) 1 % GEL Apply topically 2 times daily Yes Historical Provider, MD   Multiple Vitamins-Minerals (HAIR SKIN AND NAILS FORMULA PO) Take by mouth Yes Historical Provider, MD   TURMERIC PO Take by mouth Yes Historical Provider, MD   FOLIC ACID PO Take 2,292 mg by mouth daily  Yes Historical Provider, MD   Vitamin D-Vitamin K (DOSOQUIN) 5500-200 UNIT-MCG TABS take 1 tablet by mouth daily Yes Historical Provider, MD   acetaminophen (TYLENOL) 325 MG tablet Take 650 mg by mouth every 6 hours as needed.    Yes Historical Provider, MD   saline nasal gel (AYR) GEL by Nasal route as needed for Congestion  Alvino Swenson, DO Review of Systems : All systems reviewed, all unremarkable other than HPI. No change since last visit. Physical Exam  Constitutional:       General: She is not in acute distress. HENT:      Head: Atraumatic. Neck:      Musculoskeletal: Neck supple. Cardiovascular:      Rate and Rhythm: Normal rate. Pulses: Normal pulses. Pulmonary:      Effort: Pulmonary effort is normal. No respiratory distress. Neurological:      Mental Status: She is alert and oriented to person, place, and time. Psychiatric:         Behavior: Behavior normal.       Cervical Spine Exam: Full ROM, without limitation     Access: Adequate mouth opening       Assessment:   Lumbar stenosis    PLAN:     As advertised. Planned duration of treatment: 4 weeks. Further assessment and followup in  4 weeks for follow up post injection.        Electronically signed by Charla Henry MD on 5/7/2021 at 7:40 AM

## 2021-05-21 RX ORDER — OMEPRAZOLE 40 MG/1
CAPSULE, DELAYED RELEASE ORAL
Qty: 60 CAPSULE | Refills: 0 | Status: SHIPPED | OUTPATIENT
Start: 2021-05-21 | End: 2021-08-24 | Stop reason: SDUPTHER

## 2021-06-04 ENCOUNTER — OFFICE VISIT (OUTPATIENT)
Dept: PAIN MANAGEMENT | Age: 51
End: 2021-06-04
Payer: COMMERCIAL

## 2021-06-04 VITALS
BODY MASS INDEX: 26.67 KG/M2 | TEMPERATURE: 97.8 F | HEIGHT: 68 IN | WEIGHT: 176 LBS | HEART RATE: 78 BPM | RESPIRATION RATE: 16 BRPM

## 2021-06-04 DIAGNOSIS — M47.817 LUMBOSACRAL SPONDYLOSIS WITHOUT MYELOPATHY: Primary | ICD-10-CM

## 2021-06-04 DIAGNOSIS — G89.29 CHRONIC PAIN OF RIGHT HIP: ICD-10-CM

## 2021-06-04 DIAGNOSIS — M25.551 CHRONIC PAIN OF RIGHT HIP: ICD-10-CM

## 2021-06-04 PROCEDURE — 99213 OFFICE O/P EST LOW 20 MIN: CPT | Performed by: PHYSICAL MEDICINE & REHABILITATION

## 2021-06-04 PROCEDURE — G8419 CALC BMI OUT NRM PARAM NOF/U: HCPCS | Performed by: PHYSICAL MEDICINE & REHABILITATION

## 2021-06-04 PROCEDURE — G8427 DOCREV CUR MEDS BY ELIG CLIN: HCPCS | Performed by: PHYSICAL MEDICINE & REHABILITATION

## 2021-06-04 PROCEDURE — 1036F TOBACCO NON-USER: CPT | Performed by: PHYSICAL MEDICINE & REHABILITATION

## 2021-06-04 NOTE — PROGRESS NOTES
FOLLOW UP APPOINTMENT:           2021       Gloria Santos is a 52 y.o. female, who came for a   post injection follow up    Cause of the symptom(s): right trochanteric bursitis, tendinitis      Chronic      Prior reatment done: physical therapy, injection, anti-inflammatory medication and muscle relaxant     Current pain level: 10 on the scale of 0-10 ( 10 being worst) with walking.      Quality of Symptoms: aching, throbbing, numbness and tingling     Aggravating factors: activity     Relieving factors: rest, lying down, bending and use of medication        Allergies   Allergen Reactions    Vicodin [Hydrocodone-Acetaminophen] Nausea And Vomiting       Social History     Socioeconomic History    Marital status:      Spouse name: Not on file    Number of children: Not on file    Years of education: Not on file    Highest education level: Not on file   Occupational History    Not on file   Tobacco Use    Smoking status: Former Smoker     Years: 6.00     Quit date: 2001     Years since quittin.1    Smokeless tobacco: Never Used   Vaping Use    Vaping Use: Never used   Substance and Sexual Activity    Alcohol use: No    Drug use: No    Sexual activity: Yes     Partners: Male   Other Topics Concern    Not on file   Social History Narrative    Not on file     Social Determinants of Health     Financial Resource Strain:     Difficulty of Paying Living Expenses:    Food Insecurity:     Worried About Running Out of Food in the Last Year:     Ran Out of Food in the Last Year:    Transportation Needs:     Lack of Transportation (Medical):      Lack of Transportation (Non-Medical):    Physical Activity:     Days of Exercise per Week:     Minutes of Exercise per Session:    Stress:     Feeling of Stress :    Social Connections:     Frequency of Communication with Friends and Family:     Frequency of Social Gatherings with Friends and Family:     Attends Hoahaoism Services:     Active Member of Clubs or Organizations:     Attends Club or Organization Meetings:     Marital Status:    Intimate Partner Violence:     Fear of Current or Ex-Partner:     Emotionally Abused:     Physically Abused:     Sexually Abused:        Past Medical History:   Diagnosis Date    Anxiety     Arthritis     Asthma     as child    Depression     Hypertension     Osteoarthritis     Ulcer     stomach    Wears glasses        Past Surgical History:   Procedure Laterality Date     SECTION      CHOLECYSTECTOMY, LAPAROSCOPIC N/A 2017    CHOLECYSTECTOMY LAPAROSCOPIC; photos performed by Franky Huerta MD at [facility] Right 2021    L 4-5 RIGHT INTERLAMINAR EPIDURAL INJECTION-  IMAGING GUIDANCE performed by Lisa Cruz MD at [facility]    WISDOM TOOTH EXTRACTION         Family History   Problem Relation Age of Onset    Diabetes Mother     Cancer Mother         Liver Cancer    Heart Disease Father         Prior to Visit Medications    Medication Sig Taking? Authorizing Provider   omeprazole (PRILOSEC) 40 MG delayed release capsule take 1 capsule by mouth every morning before breakfast Yes Lisa Cruz MD   clobetasol (TEMOVATE) 0.05 % cream apply to affected area twice a day for 30 DAYS then if needed Yes Historical Provider, MD   estradiol (ESTRACE) 0.1 MG/GM vaginal cream apply ONE PEA SIZE DOLLOP ON EXTERNAL VAGINAL AREA AND SWIPE INTO. ..  (REFER TO PRESCRIPTION NOTES).  Yes Historical Provider, MD   lamoTRIgine (LAMICTAL) 25 MG tablet Take 1 tablet by mouth 2 times daily Yes An Harper DO   gabapentin (NEURONTIN) 300 MG capsule 300 mg every morning, 300 mg every afternoon, 600 mg every evening at bedtime Yes An Harper DO   citalopram (CELEXA) 40 MG tablet take 1 tablet by mouth once daily Yes An Harper DO   buPROPion (WELLBUTRIN XL) 150 MG extended release tablet take 1 tablet by mouth every morning Yes Asad Paz, DO   diclofenac sodium (VOLTAREN) 1 % GEL Apply topically 2 times daily Yes Historical Provider, MD   Multiple Vitamins-Minerals (HAIR SKIN AND NAILS FORMULA PO) Take by mouth Yes Historical Provider, MD   TURMERIC PO Take by mouth Yes Historical Provider, MD   FOLIC ACID PO Take 0,475 mg by mouth daily  Yes Historical Provider, MD   saline nasal gel (AYR) GEL by Nasal route as needed for Congestion Yes Asad Paz DO   Vitamin D-Vitamin K (DOSOQUIN) 5500-200 UNIT-MCG TABS take 1 tablet by mouth daily Yes Historical Provider, MD   acetaminophen (TYLENOL) 325 MG tablet Take 650 mg by mouth every 6 hours as needed. Yes Historical Provider, MD         Review of Systems :All systems reviewed, all unremarkable other than HPI. No fever, chills, shortness breath, gastric ulcer or acid reflux, anticoagulation, diabetes, MI/CAD, stroke, cancer. .       Pulse 78   Temp 97.8 °F (36.6 °C) (Infrared)   Resp 16   Ht 5' 8\" (1.727 m)   Wt 176 lb (79.8 kg)   LMP 10/03/2017 (Exact Date)   BMI 26.76 kg/m²       Physical Exam  Constitutional:       General: She is not in acute distress. HENT:      Head: Atraumatic. Pulmonary:      Effort: Pulmonary effort is normal. No respiratory distress. Musculoskeletal:      Cervical back: Neck supple. Comments: Positive painful ROM hip   Neurological:      Mental Status: She is alert and oriented to person, place, and time. Psychiatric:         Behavior: Behavior normal.       Assessment and Plan:      Diagnosis Orders   1. Lumbosacral spondylosis without myelopathy     2. Chronic pain of right hip  DE ARTHROCENTESIS ASPIR&/INJ MAJOR JT/BURSA W/O US    30411 - DE DRAIN/INJECT LARGE JOINT/BURSA         Right hip pain. External rotation makes it worse. Potentially a labral tear or iliopsoas tendonitis, right side. Schedule for right intra-articular hip injection under fluoroscopy.   I will try to send fluid for analysis if I can draw enough sample. Schedule injection in 2 weeks     Schedule ff up post injection virtually 2 weeks post op     All questions were answered and imaging studies reviewed with the patient. I have reviewed the chief complaint and HPI including the [facility] and Vital documentation by my staff and I agree with their documentation and have added where applicable. Time spent with patient was  25 minutes. More than 50% was spent counseling/coordinating the patient's care.          Alexander Mitchell MD   Spine Medicine/PM&R

## 2021-06-08 ENCOUNTER — OFFICE VISIT (OUTPATIENT)
Dept: FAMILY MEDICINE CLINIC | Age: 51
End: 2021-06-08
Payer: COMMERCIAL

## 2021-06-08 VITALS
SYSTOLIC BLOOD PRESSURE: 130 MMHG | HEART RATE: 83 BPM | BODY MASS INDEX: 27.74 KG/M2 | DIASTOLIC BLOOD PRESSURE: 70 MMHG | HEIGHT: 68 IN | WEIGHT: 183 LBS | OXYGEN SATURATION: 99 %

## 2021-06-08 DIAGNOSIS — E66.3 OVERWEIGHT: ICD-10-CM

## 2021-06-08 DIAGNOSIS — F41.9 ANXIETY: ICD-10-CM

## 2021-06-08 DIAGNOSIS — S76.011D TEAR OF RIGHT GLUTEUS MEDIUS TENDON, SUBSEQUENT ENCOUNTER: Primary | ICD-10-CM

## 2021-06-08 PROCEDURE — 1036F TOBACCO NON-USER: CPT | Performed by: FAMILY MEDICINE

## 2021-06-08 PROCEDURE — 99214 OFFICE O/P EST MOD 30 MIN: CPT | Performed by: FAMILY MEDICINE

## 2021-06-08 PROCEDURE — G8427 DOCREV CUR MEDS BY ELIG CLIN: HCPCS | Performed by: FAMILY MEDICINE

## 2021-06-08 PROCEDURE — G8419 CALC BMI OUT NRM PARAM NOF/U: HCPCS | Performed by: FAMILY MEDICINE

## 2021-06-08 RX ORDER — BUPROPION HYDROCHLORIDE 150 MG/1
150 TABLET ORAL EVERY MORNING
Qty: 90 TABLET | Refills: 1 | Status: CANCELLED | OUTPATIENT
Start: 2021-06-08

## 2021-06-08 RX ORDER — CITALOPRAM 40 MG/1
TABLET ORAL
Qty: 90 TABLET | Refills: 1 | Status: SHIPPED | OUTPATIENT
Start: 2021-06-08 | End: 2022-02-08 | Stop reason: SDUPTHER

## 2021-06-08 RX ORDER — ALPRAZOLAM 0.5 MG/1
0.5 TABLET ORAL 3 TIMES DAILY PRN
Qty: 60 TABLET | Refills: 0 | Status: SHIPPED | OUTPATIENT
Start: 2021-06-08 | End: 2021-10-08 | Stop reason: SDUPTHER

## 2021-06-08 SDOH — ECONOMIC STABILITY: FOOD INSECURITY: WITHIN THE PAST 12 MONTHS, THE FOOD YOU BOUGHT JUST DIDN'T LAST AND YOU DIDN'T HAVE MONEY TO GET MORE.: NEVER TRUE

## 2021-06-08 SDOH — ECONOMIC STABILITY: FOOD INSECURITY: WITHIN THE PAST 12 MONTHS, YOU WORRIED THAT YOUR FOOD WOULD RUN OUT BEFORE YOU GOT MONEY TO BUY MORE.: NEVER TRUE

## 2021-06-08 ASSESSMENT — SOCIAL DETERMINANTS OF HEALTH (SDOH): HOW HARD IS IT FOR YOU TO PAY FOR THE VERY BASICS LIKE FOOD, HOUSING, MEDICAL CARE, AND HEATING?: NOT HARD AT ALL

## 2021-06-08 NOTE — PROGRESS NOTES
Name: Bruce Jewell  : 1970         Chief Complaint:     Chief Complaint   Patient presents with    Anxiety    Hip Pain       History of Present Illness:      Bruce Jewell is a 52 y.o.  female who presents with Anxiety and Hip Pain      HPI    Continues to have significant R hip pain. Injection  which helped for about a day and then pain gradually worsened. Bothers most at night or when she's been sitting and then stands up and starts walking. Anxiety much better, tolerating current meds. Requests xanax refill, uses rarely. C/o weight gain and difficulty losing weight. Exercise limited d/t hip pain though she does have an active job. Appetite sporadic. Finds self eating a lot when not around boyfriend. He bothers her about her weight gain, complains about it, so she avoids eating large amts or certain things around him. They continue not to get along well. Pt had been on adipex in the past and did very well on it, helped with appetite and weight but also with energy levels. Interested in restarting adipex or similar. Medical History:     Patient Active Problem List   Diagnosis    Anxiety    Insomnia    HTN (hypertension)    Venous insufficiency    Overweight       Medications:       Prior to Admission medications    Medication Sig Start Date End Date Taking? Authorizing Provider   citalopram (CELEXA) 40 MG tablet take 1 tablet by mouth once daily 21  Yes Alden De Leon, DO   naltrexone-buPROPion (CONTRAVE) 8-90 MG per extended release tablet 1 po QAM for 1 week, then 1 po bid for 1 wk, then 2 po QAM and 1 po in the evening for 1 wk, then 2 po BID for 1 wk 21  Yes Alden De Leon DO   ALPRAZolam Thurmon Dice) 0.5 MG tablet Take 1 tablet by mouth 3 times daily as needed for Anxiety for up to 30 days.  21 Yes Alden De Leon,    omeprazole (PRILOSEC) 40 MG delayed release capsule take 1 capsule by mouth every morning before breakfast 21  Yes Orestes Ríos MD Nichelle   clobetasol (TEMOVATE) 0.05 % cream apply to affected area twice a day for 30 DAYS then if needed 3/18/21  Yes Historical Provider, MD   estradiol (ESTRACE) 0.1 MG/GM vaginal cream apply ONE PEA SIZE DOLLOP ON EXTERNAL VAGINAL AREA AND SWIPE INTO. ..  (REFER TO PRESCRIPTION NOTES). 3/18/21  Yes Historical Provider, MD   lamoTRIgine (LAMICTAL) 25 MG tablet Take 1 tablet by mouth 2 times daily 4/12/21  Yes Alden De Leon, DO   gabapentin (NEURONTIN) 300 MG capsule 300 mg every morning, 300 mg every afternoon, 600 mg every evening at bedtime 4/12/21 7/12/21 Yes Alden De Leon, DO   diclofenac sodium (VOLTAREN) 1 % GEL Apply topically 2 times daily   Yes Historical Provider, MD   Multiple Vitamins-Minerals (HAIR SKIN AND NAILS FORMULA PO) Take by mouth   Yes Historical Provider, MD   TURMERIC PO Take by mouth   Yes Historical Provider, MD   FOLIC ACID PO Take 8,986 mg by mouth daily    Yes Historical Provider, MD   saline nasal gel (AYR) GEL by Nasal route as needed for Congestion 5/31/19  Yes Alden De Leon, DO   Vitamin D-Vitamin K [facility]) 5500-200 UNIT-MCG TABS take 1 tablet by mouth daily 2/7/19  Yes Historical Provider, MD   acetaminophen (TYLENOL) 325 MG tablet Take 650 mg by mouth every 6 hours as needed. Yes Historical Provider, MD        Allergies:       Vicodin [hydrocodone-acetaminophen]    Physical Exam:     Vitals:  /70   Pulse 83   Ht 5' 8\" (1.727 m)   Wt 183 lb (83 kg)   LMP 10/03/2017 (Exact Date)   SpO2 99%   BMI 27.83 kg/m²   Physical Exam  Vitals and nursing note reviewed. Constitutional:       General: She is not in acute distress. Appearance: Normal appearance. She is well-developed. She is not ill-appearing. Cardiovascular:      Rate and Rhythm: Normal rate and regular rhythm. Heart sounds: Normal heart sounds. Pulmonary:      Effort: Pulmonary effort is normal.      Breath sounds: Normal breath sounds.    Neurological:      Mental Status: She is alert and oriented to person, place, and time. Psychiatric:         Mood and Affect: Mood normal.         Behavior: Behavior normal.         Data:     Lab Results   Component Value Date     03/08/2021    K 3.7 03/08/2021     03/08/2021    CO2 28 03/08/2021    BUN 7 03/08/2021    CREATININE 0.85 03/08/2021    GLUCOSE 96 03/08/2021    PROT 7.0 03/08/2021    LABALBU 4.7 03/08/2021    BILITOT 0.45 03/08/2021    ALKPHOS 89 03/08/2021    AST 19 03/08/2021    ALT <5 03/08/2021     Lab Results   Component Value Date    WBC 8.7 03/08/2021    RBC 4.58 03/08/2021    HGB 12.9 03/08/2021    HCT 39.3 03/08/2021    MCV 85.8 03/08/2021    MCH 28.2 03/08/2021    MCHC 32.9 03/08/2021    RDW 13.6 03/08/2021     03/08/2021    MPV NOT REPORTED 03/08/2021     Lab Results   Component Value Date    TSH 1.28 03/08/2021     Lab Results   Component Value Date    CHOL 216 03/08/2021    HDL 60 03/08/2021         Assessment & Plan:        Diagnosis Orders   1. Tear of right gluteus medius tendon, subsequent encounter  External Referral To Orthopedic Surgery   2. Anxiety  ALPRAZolam (XANAX) 0.5 MG tablet   3. Overweight     Ongoing right lateral hip pain, does have lumbar disc disease but has not had significant relief of pain with lumbar injections. Had abnl hip MRI with glut tear. I had recommended ortho sports med referral after MRI but pt was dealing with something else (neither of us remember) at that time and didn't go through referral. Referred to local ortho. Anxiety in ok control on current meds, cont same  3. Overweight, also has venous insufficiency and the chronic hip pain which does worsen when her weight is higher. Has done well on adipex but it's too soon to refill. Trial of contrave. Stop wellbutrin d/t bupropion as ingredient in contrave. Continue working on diet and exercise. F/u 1 mo.         Requested Prescriptions     Signed Prescriptions Disp Refills    citalopram (CELEXA) 40 MG tablet 90 tablet 1 Sig: take 1 tablet by mouth once daily    naltrexone-buPROPion (CONTRAVE) 8-90 MG per extended release tablet 70 tablet 0     Si po QAM for 1 week, then 1 po bid for 1 wk, then 2 po QAM and 1 po in the evening for 1 wk, then 2 po BID for 1 wk    ALPRAZolam (XANAX) 0.5 MG tablet 60 tablet 0     Sig: Take 1 tablet by mouth 3 times daily as needed for Anxiety for up to 30 days. Patient Instructions   SURVEY:    You may be receiving a survey from [employer] regarding your visit today. You may get this in the mail, through your MyChart or in your email. Please complete the survey to enable us to provide the highest quality of care to you and your family. If you cannot score us as very good ( 5 Stars) on any question, please feel free to call the office to discuss how we could have made your experience exceptional.     Thank you. Clinical Care Team:  Dr. Lidia Townsend DO                                           [facility], [address] [place] [address] Team:  Jessica Amor received counseling on the following healthy behaviors: medication adherence  Reviewed prior labs and health maintenance. Continue current medications, diet and exercise. Discussed use, benefit, and side effects of prescribed medications. Barriers to medication compliance addressed. Patient given educational materials - see patient instructions. All patient questions answered.   Patient voiced understanding.     signed by Lidia Townsend DO on 2021 at 10:30 PM  [address]  Dept: [phone]

## 2021-06-08 NOTE — PATIENT INSTRUCTIONS
SURVEY:    You may be receiving a survey from [employer] regarding your visit today. You may get this in the mail, through your MyChart or in your email. Please complete the survey to enable us to provide the highest quality of care to you and your family. If you cannot score us as very good ( 5 Stars) on any question, please feel free to call the office to discuss how we could have made your experience exceptional.     Thank you.     Clinical Care Team:  Dr. An Harper, DO [facility], [address] [place] [address] Team:  [address] [place] [address]

## 2021-06-16 ENCOUNTER — HOSPITAL ENCOUNTER (OUTPATIENT)
Dept: MAMMOGRAPHY | Age: 51
Discharge: HOME | End: 2021-06-18
Payer: COMMERCIAL

## 2021-06-16 ENCOUNTER — HOSPITAL ENCOUNTER (OUTPATIENT)
Dept: BONE DENSITY | Age: 51
Discharge: HOME | End: 2021-06-18
Payer: COMMERCIAL

## 2021-06-16 ENCOUNTER — HOSPITAL ENCOUNTER (OUTPATIENT)
Age: 51
Discharge: HOME | End: 2021-06-16
Payer: COMMERCIAL

## 2021-06-16 DIAGNOSIS — Z12.31 SCREENING MAMMOGRAM, ENCOUNTER FOR: ICD-10-CM

## 2021-06-16 DIAGNOSIS — Z78.0 POST-MENOPAUSE: ICD-10-CM

## 2021-06-16 LAB
CALCIUM SERPL-MCNC: 9.7 MG/DL (ref 8.6–10.4)
CREAT SERPL-MCNC: 0.85 MG/DL (ref 0.5–0.9)
GFR AFRICAN AMERICAN: >60 ML/MIN
GFR NON-AFRICAN AMERICAN: >60 ML/MIN
GFR SERPL CREATININE-BSD FRML MDRD: NORMAL ML/MIN/{1.73_M2}
GFR SERPL CREATININE-BSD FRML MDRD: NORMAL ML/MIN/{1.73_M2}
VITAMIN D 25-HYDROXY: 98 NG/ML (ref 30–100)

## 2021-06-16 PROCEDURE — 36415 COLL VENOUS BLD VENIPUNCTURE: CPT

## 2021-06-16 PROCEDURE — 82306 VITAMIN D 25 HYDROXY: CPT

## 2021-06-16 PROCEDURE — 77063 BREAST TOMOSYNTHESIS BI: CPT

## 2021-06-16 PROCEDURE — 82310 ASSAY OF CALCIUM: CPT

## 2021-06-16 PROCEDURE — 82565 ASSAY OF CREATININE: CPT

## 2021-06-16 PROCEDURE — 77080 DXA BONE DENSITY AXIAL: CPT

## 2021-07-05 DIAGNOSIS — R45.86 MOOD SWINGS: ICD-10-CM

## 2021-07-06 RX ORDER — LAMOTRIGINE 25 MG/1
TABLET ORAL
Qty: 60 TABLET | Refills: 2 | Status: SHIPPED | OUTPATIENT
Start: 2021-07-06 | End: 2021-10-06

## 2021-07-06 NOTE — TELEPHONE ENCOUNTER
Last OV: 6/8/2021Anxiety Hip pain   Last RX:   Next scheduled apt: Visit date not found      Sure scripts request      RX pending

## 2021-08-24 ENCOUNTER — HOSPITAL ENCOUNTER (OUTPATIENT)
Age: 51
Setting detail: SPECIMEN
Discharge: HOME | End: 2021-08-24
Payer: COMMERCIAL

## 2021-08-24 ENCOUNTER — OFFICE VISIT (OUTPATIENT)
Dept: FAMILY MEDICINE CLINIC | Age: 51
End: 2021-08-24
Payer: COMMERCIAL

## 2021-08-24 VITALS
SYSTOLIC BLOOD PRESSURE: 120 MMHG | WEIGHT: 186 LBS | DIASTOLIC BLOOD PRESSURE: 74 MMHG | OXYGEN SATURATION: 98 % | HEART RATE: 83 BPM | BODY MASS INDEX: 28.19 KG/M2 | HEIGHT: 68 IN

## 2021-08-24 DIAGNOSIS — R30.0 DYSURIA: ICD-10-CM

## 2021-08-24 DIAGNOSIS — I87.2 VENOUS INSUFFICIENCY: ICD-10-CM

## 2021-08-24 DIAGNOSIS — F41.9 ANXIETY: ICD-10-CM

## 2021-08-24 DIAGNOSIS — R30.0 DYSURIA: Primary | ICD-10-CM

## 2021-08-24 DIAGNOSIS — M25.551 CHRONIC RIGHT HIP PAIN: ICD-10-CM

## 2021-08-24 DIAGNOSIS — E66.3 OVERWEIGHT: ICD-10-CM

## 2021-08-24 DIAGNOSIS — G89.29 CHRONIC RIGHT HIP PAIN: ICD-10-CM

## 2021-08-24 LAB
BILIRUBIN, POC: NORMAL
BLOOD URINE, POC: NORMAL
CLARITY, POC: CLEAR
COLOR, POC: YELLOW
GLUCOSE URINE, POC: NORMAL
KETONES, POC: NORMAL
LEUKOCYTE EST, POC: NORMAL
NITRITE, POC: NORMAL
PH, POC: 5.5
PROTEIN, POC: NORMAL
SPECIFIC GRAVITY, POC: 1.03
UROBILINOGEN, POC: 0.2

## 2021-08-24 PROCEDURE — G8427 DOCREV CUR MEDS BY ELIG CLIN: HCPCS | Performed by: FAMILY MEDICINE

## 2021-08-24 PROCEDURE — 87086 URINE CULTURE/COLONY COUNT: CPT

## 2021-08-24 PROCEDURE — G8419 CALC BMI OUT NRM PARAM NOF/U: HCPCS | Performed by: FAMILY MEDICINE

## 2021-08-24 PROCEDURE — 81002 URINALYSIS NONAUTO W/O SCOPE: CPT | Performed by: FAMILY MEDICINE

## 2021-08-24 PROCEDURE — 99214 OFFICE O/P EST MOD 30 MIN: CPT | Performed by: FAMILY MEDICINE

## 2021-08-24 PROCEDURE — 1036F TOBACCO NON-USER: CPT | Performed by: FAMILY MEDICINE

## 2021-08-24 RX ORDER — OMEPRAZOLE 40 MG/1
CAPSULE, DELAYED RELEASE ORAL
Qty: 30 CAPSULE | Refills: 5 | Status: SHIPPED | OUTPATIENT
Start: 2021-08-24 | End: 2022-01-14 | Stop reason: SINTOL

## 2021-08-24 RX ORDER — GABAPENTIN 300 MG/1
CAPSULE ORAL
Qty: 120 CAPSULE | Refills: 2 | Status: SHIPPED | OUTPATIENT
Start: 2021-08-24 | End: 2022-02-08 | Stop reason: SDUPTHER

## 2021-08-24 RX ORDER — PHENTERMINE HYDROCHLORIDE 37.5 MG/1
37.5 TABLET ORAL
Qty: 30 TABLET | Refills: 0 | Status: SHIPPED | OUTPATIENT
Start: 2021-08-24 | End: 2021-09-07 | Stop reason: SDUPTHER

## 2021-08-24 NOTE — PROGRESS NOTES
Name: Nancy Monzon  : 1970         Chief Complaint:     Chief Complaint   Patient presents with    Weight Gain    Dysuria       History of Present Illness:      Nancy Monzon is a 52 y.o.  female who presents with Weight Gain and Dysuria      HPI    Patient complains for the past few days she has been experiencing burning with urination, some suprapubic pain. Trying to inc water intake which has helped. Anxiety uncontrolled, having panic attacks r/t fear of COVID. Continues meds which are helpful. Considering vaccine but is concerned about possible adverse effects. R hip feeling much better, getting stronger. Wonders if r/t last right lateral hip steroid injection, which was in May. Also did move out of boyfriend's place so she has a different mattress which she thinks could contribute - feels better on a firm mattress. Obesity with weight gain. Tried contrave but it didn't help at all. Eating bologna sandwiches, chips, but small amounts. Admits she eats the wrong things at work. Has cut back on some of the things she used to eat in secret r/t boyfriend not wanting her to eat those things, but that hasn't been for very long. Spot L foot. Will see podiatry later this wk for pain. Medical History:     Patient Active Problem List   Diagnosis    Anxiety    Insomnia    HTN (hypertension)    Venous insufficiency    Overweight       Medications:       Prior to Admission medications    Medication Sig Start Date End Date Taking? Authorizing Provider   omeprazole (PRILOSEC) 40 MG delayed release capsule take 1 capsule by mouth every morning before breakfast 21  Yes Ruddy Okeefe DO   gabapentin (NEURONTIN) 300 MG capsule 300 mg every morning, 300 mg every afternoon, 600 mg every evening at bedtime 21 Yes Ruddy Okeefe DO   phentermine (ADIPEX-P) 37.5 MG tablet Take 1 tablet by mouth every morning (before breakfast) for 30 days.  21 Yes Lindsey LORENZO Leigh Alvarado,    lamoTRIgine (LAMICTAL) 25 MG tablet take 1 tablet by mouth twice a day 7/6/21  Yes Analia Shaw DO   citalopram (CELEXA) 40 MG tablet take 1 tablet by mouth once daily 6/8/21  Yes Analia Shaw DO   naltrexone-buPROPion (CONTRAVE) 8-90 MG per extended release tablet 1 po QAM for 1 week, then 1 po bid for 1 wk, then 2 po QAM and 1 po in the evening for 1 wk, then 2 po BID for 1 wk 6/8/21  Yes Analia Shaw DO   clobetasol (TEMOVATE) 0.05 % cream apply to affected area twice a day for 30 DAYS then if needed 3/18/21  Yes Historical Provider, MD   estradiol (ESTRACE) 0.1 MG/GM vaginal cream apply ONE PEA SIZE DOLLOP ON EXTERNAL VAGINAL AREA AND SWIPE INTO. ..  (REFER TO PRESCRIPTION NOTES). 3/18/21  Yes Historical Provider, MD   diclofenac sodium (VOLTAREN) 1 % GEL Apply topically 2 times daily   Yes Historical Provider, MD   Multiple Vitamins-Minerals (HAIR SKIN AND NAILS FORMULA PO) Take by mouth   Yes Historical Provider, MD   TURMERIC PO Take by mouth   Yes Historical Provider, MD   FOLIC ACID PO Take 7,421 mg by mouth daily    Yes Historical Provider, MD   saline nasal gel (AYR) GEL by Nasal route as needed for Congestion 5/31/19  Yes Analia Shaw DO   Vitamin D-Vitamin K [facility]) 5500-200 UNIT-MCG TABS take 1 tablet by mouth daily 2/7/19  Yes Historical Provider, MD   acetaminophen (TYLENOL) 325 MG tablet Take 650 mg by mouth every 6 hours as needed. Yes Historical Provider, MD        Allergies:       Vicodin [hydrocodone-acetaminophen]    Physical Exam:     Vitals:  /74   Pulse 83   Ht 5' 8\" (1.727 m)   Wt 186 lb (84.4 kg)   LMP 10/03/2017 (Exact Date)   SpO2 98%   BMI 28.28 kg/m²   Physical Exam  Vitals and nursing note reviewed. Constitutional:       General: She is not in acute distress. Appearance: Normal appearance. She is well-developed. She is not ill-appearing. Cardiovascular:      Rate and Rhythm: Normal rate and regular rhythm.       Heart sounds: Normal heart sounds. Pulmonary:      Effort: Pulmonary effort is normal.      Breath sounds: Normal breath sounds. Skin:     Comments: Plantar wart L posterior arch   Neurological:      Mental Status: She is alert and oriented to person, place, and time. Psychiatric:         Mood and Affect: Mood normal.         Behavior: Behavior normal.         Data:     Lab Results   Component Value Date     03/08/2021    K 3.7 03/08/2021     03/08/2021    CO2 28 03/08/2021    BUN 7 03/08/2021    CREATININE 0.85 06/16/2021    GLUCOSE 96 03/08/2021    PROT 7.0 03/08/2021    LABALBU 4.7 03/08/2021    BILITOT 0.45 03/08/2021    ALKPHOS 89 03/08/2021    AST 19 03/08/2021    ALT <5 03/08/2021     Lab Results   Component Value Date    WBC 8.7 03/08/2021    RBC 4.58 03/08/2021    HGB 12.9 03/08/2021    HCT 39.3 03/08/2021    MCV 85.8 03/08/2021    MCH 28.2 03/08/2021    MCHC 32.9 03/08/2021    RDW 13.6 03/08/2021     03/08/2021    MPV NOT REPORTED 03/08/2021     Lab Results   Component Value Date    TSH 1.28 03/08/2021     Lab Results   Component Value Date    CHOL 216 03/08/2021    HDL 60 03/08/2021     Results for POC orders placed in visit on 08/24/21   POCT Urinalysis no Micro   Result Value Ref Range    Color, UA yellow     Clarity, UA clear     Glucose, UA POC neg     Bilirubin, UA neg     Ketones, UA neg     Spec Grav, UA 1.030     Blood, UA POC neg     pH, UA 5.5     Protein, UA POC neg     Urobilinogen, UA 0.2     Leukocytes, UA small     Nitrite, UA nit          Assessment & Plan:        Diagnosis Orders   1. Dysuria  POCT Urinalysis no Micro    Culture, Urine   2. Anxiety     3. Chronic right hip pain  phentermine (ADIPEX-P) 37.5 MG tablet   4. Overweight  phentermine (ADIPEX-P) 37.5 MG tablet   5. Venous insufficiency  phentermine (ADIPEX-P) 37.5 MG tablet   1. Dysuria, mildly abnormal UA.   Symptoms have already been going on for several days and are not severe, so we will await culture result before deciding on treatment. Advise good hydration, avoidance of bladder irritants, may use Azo as needed. 2.  Anxiety with some uncontrolled symptoms recently. Advised that she do get COVID vaccine to protect self from serious disease. Continue same Rx.  3-5. Overweight with associated conditions that worsen when she's at higher weight. Needs to work aggressively on diet choices, discussed specifics. May also restart adipex. F/u 1 mo. Requested Prescriptions     Signed Prescriptions Disp Refills    omeprazole (PRILOSEC) 40 MG delayed release capsule 30 capsule 5     Sig: take 1 capsule by mouth every morning before breakfast    gabapentin (NEURONTIN) 300 MG capsule 120 capsule 2     Si mg every morning, 300 mg every afternoon, 600 mg every evening at bedtime    phentermine (ADIPEX-P) 37.5 MG tablet 30 tablet 0     Sig: Take 1 tablet by mouth every morning (before breakfast) for 30 days. There are no Patient Instructions on file for this visit. Marcos Post received counseling on the following healthy behaviors: medication adherence  Reviewed prior labs and health maintenance. Continue current medications, diet and exercise. Discussed use, benefit, and side effects of prescribed medications. Barriers to medication compliance addressed. Patient given educational materials - see patient instructions. All patient questions answered.   Patient voiced understanding.     signed by Kirsten Dickerson DO on 2021 at 5:25 PM  [address]  Dept: [phone]

## 2021-08-26 LAB
CULTURE: NORMAL
Lab: NORMAL
SPECIMEN DESCRIPTION: NORMAL

## 2021-09-07 DIAGNOSIS — E66.3 OVERWEIGHT: ICD-10-CM

## 2021-09-07 DIAGNOSIS — M25.551 CHRONIC RIGHT HIP PAIN: ICD-10-CM

## 2021-09-07 DIAGNOSIS — I87.2 VENOUS INSUFFICIENCY: ICD-10-CM

## 2021-09-07 DIAGNOSIS — G89.29 CHRONIC RIGHT HIP PAIN: ICD-10-CM

## 2021-09-07 RX ORDER — PHENTERMINE HYDROCHLORIDE 37.5 MG/1
37.5 TABLET ORAL
Qty: 30 TABLET | Refills: 0 | Status: SHIPPED | OUTPATIENT
Start: 2021-09-07 | End: 2021-10-07

## 2021-09-13 ENCOUNTER — HOSPITAL ENCOUNTER (OUTPATIENT)
Dept: PREADMISSION TESTING | Age: 51
Setting detail: SPECIMEN
Discharge: HOME | End: 2021-09-13
Payer: COMMERCIAL

## 2021-09-13 ENCOUNTER — TELEPHONE (OUTPATIENT)
Dept: FAMILY MEDICINE CLINIC | Age: 51
End: 2021-09-13

## 2021-09-13 DIAGNOSIS — Z20.822 SUSPECTED COVID-19 VIRUS INFECTION: Primary | ICD-10-CM

## 2021-09-13 DIAGNOSIS — Z20.822 SUSPECTED COVID-19 VIRUS INFECTION: ICD-10-CM

## 2021-09-13 PROCEDURE — C9803 HOPD COVID-19 SPEC COLLECT: HCPCS

## 2021-09-13 NOTE — TELEPHONE ENCOUNTER
Lillie Garcia called and said her allergies are bothering her. She said her head and nose feel plugged and she is c/o having a headache. This has been going on since this weekend. Please let Lillie Garcia know if she should get tested or something called in for her.     Health Maintenance   Topic Date Due    Hepatitis C screen  Never done    COVID-19 Vaccine (1) Never done    HIV screen  Never done    Cervical cancer screen  Never done    Colon cancer screen colonoscopy  Never done    Flu vaccine (1) Never done    Lipid screen  03/08/2026    DTaP/Tdap/Td vaccine (2 - Td or Tdap) 11/29/2028    Hepatitis A vaccine  Aged Out    Hepatitis B vaccine  Aged Out    Hib vaccine  Aged Out    Meningococcal (ACWY) vaccine  Aged Out    Pneumococcal 0-64 years Vaccine  Aged Out             (applicable per patient's age: Cancer Screenings, Depression Screening, Fall Risk Screening, Immunizations)    LDL Cholesterol (mg/dL)   Date Value   03/08/2021 130     AST (U/L)   Date Value   03/08/2021 19     ALT (U/L)   Date Value   03/08/2021 <5 (L)     BUN (mg/dL)   Date Value   03/08/2021 7      (goal A1C is < 7)   (goal LDL is <100) need 30-50% reduction from baseline     BP Readings from Last 3 Encounters:   08/24/21 120/74   06/08/21 130/70   05/07/21 139/72    (goal /80)      All Future Testing planned in [facility]:  Lab Frequency Next Occurrence   WA NJX DX/THER SBST INTRLMNR LMBR/SAC W/IMG GDN Once 04/23/2021       Next Visit Date:  Future Appointments   Date Time Provider Reginaldo Echavarria   9/24/2021  2:20 PM DO Fernanda Alexis [facility]            Patient Active Problem List:     Anxiety     Insomnia     HTN (hypertension)     Venous insufficiency     Overweight

## 2021-10-08 ENCOUNTER — OFFICE VISIT (OUTPATIENT)
Dept: FAMILY MEDICINE CLINIC | Age: 51
End: 2021-10-08
Payer: COMMERCIAL

## 2021-10-08 VITALS
WEIGHT: 183 LBS | HEIGHT: 67 IN | BODY MASS INDEX: 28.72 KG/M2 | SYSTOLIC BLOOD PRESSURE: 120 MMHG | HEART RATE: 76 BPM | DIASTOLIC BLOOD PRESSURE: 60 MMHG | OXYGEN SATURATION: 99 %

## 2021-10-08 DIAGNOSIS — M79.672 CHRONIC FOOT PAIN, LEFT: ICD-10-CM

## 2021-10-08 DIAGNOSIS — G89.29 CHRONIC FOOT PAIN, LEFT: ICD-10-CM

## 2021-10-08 DIAGNOSIS — F41.9 ANXIETY: ICD-10-CM

## 2021-10-08 DIAGNOSIS — G93.32 CHRONIC FATIGUE SYNDROME: Primary | ICD-10-CM

## 2021-10-08 PROCEDURE — 1036F TOBACCO NON-USER: CPT | Performed by: FAMILY MEDICINE

## 2021-10-08 PROCEDURE — G8427 DOCREV CUR MEDS BY ELIG CLIN: HCPCS | Performed by: FAMILY MEDICINE

## 2021-10-08 PROCEDURE — G8484 FLU IMMUNIZE NO ADMIN: HCPCS | Performed by: FAMILY MEDICINE

## 2021-10-08 PROCEDURE — 99214 OFFICE O/P EST MOD 30 MIN: CPT | Performed by: FAMILY MEDICINE

## 2021-10-08 PROCEDURE — G8419 CALC BMI OUT NRM PARAM NOF/U: HCPCS | Performed by: FAMILY MEDICINE

## 2021-10-08 RX ORDER — PHENTERMINE HYDROCHLORIDE 37.5 MG/1
37.5 TABLET ORAL
Qty: 30 TABLET | Refills: 0 | Status: CANCELLED | OUTPATIENT
Start: 2021-10-08 | End: 2021-11-07

## 2021-10-08 RX ORDER — DEXTROAMPHETAMINE SACCHARATE, AMPHETAMINE ASPARTATE MONOHYDRATE, DEXTROAMPHETAMINE SULFATE AND AMPHETAMINE SULFATE 3.75; 3.75; 3.75; 3.75 MG/1; MG/1; MG/1; MG/1
15 CAPSULE, EXTENDED RELEASE ORAL DAILY
Qty: 30 CAPSULE | Refills: 0 | Status: SHIPPED | OUTPATIENT
Start: 2021-10-08 | End: 2021-11-08 | Stop reason: SDUPTHER

## 2021-10-08 RX ORDER — ALPRAZOLAM 0.5 MG/1
0.5 TABLET ORAL 3 TIMES DAILY PRN
Qty: 60 TABLET | Refills: 0 | Status: SHIPPED | OUTPATIENT
Start: 2021-10-08 | End: 2022-08-30 | Stop reason: SDUPTHER

## 2021-10-08 RX ORDER — BUPROPION HYDROCHLORIDE 150 MG/1
150 TABLET ORAL EVERY MORNING
Qty: 90 TABLET | Refills: 1 | Status: SHIPPED | OUTPATIENT
Start: 2021-10-08 | End: 2021-11-08 | Stop reason: ALTCHOICE

## 2021-10-08 NOTE — PROGRESS NOTES
Name: Chalino Salas  : 1970         Chief Complaint:     Chief Complaint   Patient presents with    Obesity       History of Present Illness:      Chalino Salas is a 54 y.o.  female who presents with Obesity      HPI    More trouble with L foot, more weakness in LLE. Getting fitted for AFO. Recent sinus congestion, better since getting mold out of workplace. Overweight, has been on adipex which helps appetite but hasn't been able to work out because of the L foot trouble. What she actually likes most about adipex is the energy it gives her - very fatigued otherwise regardless of sleeping well and getting decent amt of sleep. 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 Actually has had 4 family members die from it plus a coworker. She got her first shot and is nervous about getting second one next wk. Medical History:     Patient Active Problem List   Diagnosis    Anxiety    Insomnia    HTN (hypertension)    Venous insufficiency    Overweight       Medications:       Prior to Admission medications    Medication Sig Start Date End Date Taking? Authorizing Provider   ALPRAZojose Pierre) 0.5 MG tablet Take 1 tablet by mouth 3 times daily as needed for Anxiety for up to 30 days. 10/8/21 11/7/21 Yes Chandrika Calvillo,    amphetamine-dextroamphetamine (ADDERALL XR) 15 MG extended release capsule Take 1 capsule by mouth daily for 30 days.  10/8/21 11/7/21 Yes Chandrika Calvillo DO   buPROPion (WELLBUTRIN XL) 150 MG extended release tablet Take 1 tablet by mouth every morning 10/8/21  Yes Chandrika Calvillo DO   lamoTRIgine (LAMICTAL) 25 MG tablet take 1 tablet by mouth twice a day 10/6/21  Yes Chandrika Calvillo DO   omeprazole (PRILOSEC) 40 MG delayed release capsule take 1 capsule by mouth every morning before breakfast 21  Yes Chandrika Calvillo DO   gabapentin (NEURONTIN) 300 MG capsule 300 mg every morning, 300 mg every afternoon, 600 mg every evening at bedtime 8/24/21 1/5/22 Yes Nav Lai DO   citalopram (CELEXA) 40 MG tablet take 1 tablet by mouth once daily 6/8/21  Yes Nav Lai DO   clobetasol (TEMOVATE) 0.05 % cream apply to affected area twice a day for 30 DAYS then if needed 3/18/21  Yes Historical Provider, MD   estradiol (ESTRACE) 0.1 MG/GM vaginal cream apply ONE PEA SIZE DOLLOP ON EXTERNAL VAGINAL AREA AND SWIPE INTO. ..  (REFER TO PRESCRIPTION NOTES). 3/18/21  Yes Historical Provider, MD   diclofenac sodium (VOLTAREN) 1 % GEL Apply topically 2 times daily   Yes Historical Provider, MD   Multiple Vitamins-Minerals (HAIR SKIN AND NAILS FORMULA PO) Take by mouth   Yes Historical Provider, MD   TURMERIC PO Take by mouth   Yes Historical Provider, MD   FOLIC ACID PO Take 1,582 mg by mouth daily    Yes Historical Provider, MD   saline nasal gel (AYR) GEL by Nasal route as needed for Congestion 5/31/19  Yes Nav Lai DO   Vitamin D-Vitamin K [facility]) 5500-200 UNIT-MCG TABS take 1 tablet by mouth daily 2/7/19  Yes Historical Provider, MD   acetaminophen (TYLENOL) 325 MG tablet Take 650 mg by mouth every 6 hours as needed.      Yes Historical Provider, MD        Allergies:       Vicodin [hydrocodone-acetaminophen]    Physical Exam:     Vitals:  /60   Pulse 76   Ht 5' 6.9\" (1.699 m)   Wt 183 lb (83 kg)   LMP 10/03/2017 (Exact Date)   SpO2 99%   BMI 28.75 kg/m²   Physical Exam    Data:     Lab Results   Component Value Date     03/08/2021    K 3.7 03/08/2021     03/08/2021    CO2 28 03/08/2021    BUN 7 03/08/2021    CREATININE 0.85 06/16/2021    GLUCOSE 96 03/08/2021    PROT 7.0 03/08/2021    LABALBU 4.7 03/08/2021    BILITOT 0.45 03/08/2021    ALKPHOS 89 03/08/2021    AST 19 03/08/2021    ALT <5 03/08/2021     Lab Results   Component Value Date    WBC 8.7 03/08/2021    RBC 4.58 03/08/2021    HGB 12.9 03/08/2021    HCT 39.3 03/08/2021    MCV 85.8 03/08/2021    MCH 28.2 03/08/2021    MCHC 32.9 03/08/2021    RDW 13.6 03/08/2021     03/08/2021    MPV NOT REPORTED 03/08/2021     Lab Results   Component Value Date    TSH 1.28 03/08/2021     Lab Results   Component Value Date    CHOL 216 03/08/2021    HDL 60 03/08/2021         Assessment & Plan:        Diagnosis Orders   1. Chronic fatigue syndrome  amphetamine-dextroamphetamine (ADDERALL XR) 15 MG extended release capsule   2. Anxiety  ALPRAZolam (XANAX) 0.5 MG tablet    External Referral To Counseling Services   3. Chronic foot pain, left     CFS, has been helped by adipex in the past. Changing to adderall xr d/t ability to use med long-term. F/u 1 mo to assess response. Anxiety uncontrolled, many situational factors. Discussed. STRONGLY advised she go ahead with 2nd COVID injection. Ok to use xanax prn.  3. Chronic L foot pain, foot drop. Seeing podiatry and getting AFO. Requested Prescriptions     Signed Prescriptions Disp Refills    ALPRAZolam (XANAX) 0.5 MG tablet 60 tablet 0     Sig: Take 1 tablet by mouth 3 times daily as needed for Anxiety for up to 30 days.  amphetamine-dextroamphetamine (ADDERALL XR) 15 MG extended release capsule 30 capsule 0     Sig: Take 1 capsule by mouth daily for 30 days.  buPROPion (WELLBUTRIN XL) 150 MG extended release tablet 90 tablet 1     Sig: Take 1 tablet by mouth every morning         There are no Patient Instructions on file for this visit. Eliza Dipak received counseling on the following healthy behaviors: medication adherence  Reviewed prior labs and health maintenance. Continue current medications, diet and exercise. Discussed use, benefit, and side effects of prescribed medications. Barriers to medication compliance addressed. Patient given educational materials - see patient instructions. All patient questions answered.   Patient voiced understanding.     signed by Danielle Almaraz DO on 10/10/2021 at 10:27 PM  [address] [facility] [place] [id number]  Dept: [phone]

## 2021-10-18 ENCOUNTER — HOSPITAL ENCOUNTER (EMERGENCY)
Age: 51
Discharge: HOME | End: 2021-10-18
Attending: EMERGENCY MEDICINE
Payer: COMMERCIAL

## 2021-10-18 ENCOUNTER — APPOINTMENT (OUTPATIENT)
Dept: GENERAL RADIOLOGY | Age: 51
End: 2021-10-18
Payer: COMMERCIAL

## 2021-10-18 VITALS
SYSTOLIC BLOOD PRESSURE: 119 MMHG | DIASTOLIC BLOOD PRESSURE: 69 MMHG | TEMPERATURE: 98.9 F | BODY MASS INDEX: 28.34 KG/M2 | OXYGEN SATURATION: 96 % | HEART RATE: 86 BPM | WEIGHT: 180.4 LBS | RESPIRATION RATE: 20 BRPM

## 2021-10-18 DIAGNOSIS — S93.602A FOOT SPRAIN, LEFT, INITIAL ENCOUNTER: Primary | ICD-10-CM

## 2021-10-18 PROCEDURE — 6370000000 HC RX 637 (ALT 250 FOR IP): Performed by: EMERGENCY MEDICINE

## 2021-10-18 PROCEDURE — 99285 EMERGENCY DEPT VISIT HI MDM: CPT

## 2021-10-18 PROCEDURE — 73630 X-RAY EXAM OF FOOT: CPT

## 2021-10-18 RX ORDER — IBUPROFEN 200 MG
600 TABLET ORAL ONCE
Status: COMPLETED | OUTPATIENT
Start: 2021-10-18 | End: 2021-10-18

## 2021-10-18 RX ADMIN — IBUPROFEN 600 MG: 200 TABLET, FILM COATED ORAL at 00:44

## 2021-10-18 ASSESSMENT — ENCOUNTER SYMPTOMS
CHOKING: 0
COLOR CHANGE: 0

## 2021-10-18 ASSESSMENT — PAIN DESCRIPTION - LOCATION: LOCATION: FOOT

## 2021-10-18 ASSESSMENT — PAIN SCALES - GENERAL
PAINLEVEL_OUTOF10: 6
PAINLEVEL_OUTOF10: 0
PAINLEVEL_OUTOF10: 6

## 2021-10-18 ASSESSMENT — PAIN DESCRIPTION - ORIENTATION: ORIENTATION: LEFT

## 2021-10-18 NOTE — ED PROVIDER NOTES
[facility] ED  eMERGENCY dEPARTMENT eNCOUnter      Pt Name: Yuval Matson  MRN: [id number]  [place] 1970  Date of evaluation: 10/18/2021  Provider: Rory Elise MD    CHIEF COMPLAINT       Chief Complaint   Patient presents with    Foot Injury     Pt fell two nights ago and landed on her left foot two nights ago. Pain is a 6/10. Patient is supposed to have surgery the on left foot        Patient is a 53-year-old female who presents to the emergency department complaining of left foot pain. She states 2 nights ago she stumbled and twisted her foot. She states that it hurts now when she walks on it. She denies any fever or chills. She denies any numbness or tingling or weakness. Nursing Notes were reviewed. REVIEW OF SYSTEMS    (2-9 systems for level 4, 10 or more for level 5)     Review of Systems   Constitutional: Negative for chills and fever. Respiratory: Negative for choking. Skin: Negative for color change and rash. Neurological: Negative for weakness and numbness. Except as noted above the remainder of the review of systems was reviewed and negative.        PAST MEDICAL HISTORY     Past Medical History:   Diagnosis Date    Anxiety     Arthritis     Asthma     as child    Depression     Osteoarthritis     Ulcer     stomach    Wears glasses          SURGICAL HISTORY       Past Surgical History:   Procedure Laterality Date     SECTION      CHOLECYSTECTOMY      CHOLECYSTECTOMY, LAPAROSCOPIC N/A 2017    CHOLECYSTECTOMY LAPAROSCOPIC; photos performed by Morgan Chandler MD at [address] Right 2021    L 4-5 RIGHT INTERLAMINAR EPIDURAL INJECTION-  IMAGING GUIDANCE performed by Terrance Zamarrpia MD at [address] EXTRACTION           ALLERGIES     Vicodin [hydrocodone-acetaminophen]    FAMILY HISTORY       Family History   Problem Relation Age of Onset    Diabetes Mother     Cancer Mother         Liver Cancer    Heart Disease Father           SOCIAL HISTORY       Social History     Socioeconomic History    Marital status:      Spouse name: None    Number of children: None    Years of education: None    Highest education level: None   Occupational History    None   Tobacco Use    Smoking status: Former Smoker     Years: 6.00     Quit date: 2001     Years since quittin.4    Smokeless tobacco: Never Used   Vaping Use    Vaping Use: Never used   Substance and Sexual Activity    Alcohol use: No    Drug use: No    Sexual activity: Yes     Partners: Male   Other Topics Concern    None   Social History Narrative    None     Social Determinants of Health     Financial Resource Strain: Low Risk     Difficulty of Paying Living Expenses: Not hard at all   Food Insecurity: No Food Insecurity    Worried About [address] in the Last Year: Never true    [address] [employer] in the Last Year: Never true   Transportation Needs:     Lack of Transportation (Medical):      Lack of Transportation (Non-Medical):    Physical Activity:     Days of Exercise per Week:     Minutes of Exercise per Session:    Stress:     Feeling of Stress :    Social Connections:     Frequency of Communication with Friends and Family:     Frequency of Social Gatherings with Friends and Family:     Attends [employer] Services:     Active Member of Clubs or Organizations:     Attends Club or Organization Meetings:     Marital Status:    Intimate Partner Violence:     Fear of Current or Ex-Partner:     Emotionally Abused:     Physically Abused:     Sexually Abused:            PHYSICAL EXAM    (up to 7 for level 4, 8 ormore for level 5)     ED Triage Vitals   BP Temp Temp Source Pulse Resp SpO2 Height Weight   10/18/21 0035 10/18/21 0030 10/18/21 0030 10/18/21 0030 10/18/21 0030 10/18/21 0030 -- 10/18/21 0030   119/69 98.9 °F (37.2 °C) Oral 86 20 96 %  180 lb 6.4 oz (81.8 kg)       Physical Exam     Physical    Vital signs and nursing notes were reviewed as well as the social, family, and past medical history. Gen. appearance: Patient is alert and oriented and in no acute distress    Head: Atraumatic, normocephalic    Neck: Supple, trachea/thyroid normal    EENT: PERRLA, EOMI, conjunctiva normal.    Skin: Warm and dry with no rash    Musculoskeletal: Patient has tenderness and mild swelling to the dorsum of the left foot. There is no erythema or warmth. There is no tenderness at the ankle. Neurological: Patient is alert and oriented ×3, no focal motor or sensory deficits noted      DIAGNOSTIC RESULTS             LABS:  Labs Reviewed - No data to display    All other labs were within normal range or not returned as of this dictation. EMERGENCY DEPARTMENT COURSE and DIFFERENTIAL DIAGNOSIS/MDM:   Vitals:    Vitals:    10/18/21 0030 10/18/21 0035   BP:  119/69   Pulse: 86    Resp: 20    Temp: 98.9 °F (37.2 °C)    TempSrc: Oral    SpO2: 96%    Weight: 180 lb 6.4 oz (81.8 kg)                  REASSESSMENT      Here in the ED x-ray was done and was negative and we will discharged home to follow-up with her primary doctor. PROCEDURES:  Unless otherwise noted below, none     Procedures    FINAL IMPRESSION      1.  Foot sprain, left, initial encounter          DISPOSITION/PLAN   DISPOSITION Decision To Discharge 10/18/2021 01:37:19 AM      PATIENT REFERRED TO:  Roger Rebolledo DO  [facility] [address] [phone]    In 2 days        DISCHARGE MEDICATIONS:  New Prescriptions    No medications on file          (Please note that portions ofthis note were completed with a voice recognition program.  Efforts were made to edit the dictations but occasionally words are mis-transcribed.)    Mirna Kamara MD(electronically signed)  Attending Emergency Physician            Mirna Kamara MD  10/18/21 3219

## 2021-11-08 ENCOUNTER — HOSPITAL ENCOUNTER (OUTPATIENT)
Age: 51
Discharge: HOME | End: 2021-11-08
Payer: COMMERCIAL

## 2021-11-08 ENCOUNTER — OFFICE VISIT (OUTPATIENT)
Dept: FAMILY MEDICINE CLINIC | Age: 51
End: 2021-11-08
Payer: COMMERCIAL

## 2021-11-08 VITALS
BODY MASS INDEX: 28.41 KG/M2 | HEIGHT: 67 IN | WEIGHT: 181 LBS | SYSTOLIC BLOOD PRESSURE: 120 MMHG | DIASTOLIC BLOOD PRESSURE: 70 MMHG

## 2021-11-08 DIAGNOSIS — F41.9 ANXIETY AND DEPRESSION: ICD-10-CM

## 2021-11-08 DIAGNOSIS — Z11.4 SCREENING FOR HIV (HUMAN IMMUNODEFICIENCY VIRUS): ICD-10-CM

## 2021-11-08 DIAGNOSIS — G93.32 CHRONIC FATIGUE SYNDROME: Primary | ICD-10-CM

## 2021-11-08 DIAGNOSIS — Z11.59 ENCOUNTER FOR HEPATITIS C SCREENING TEST FOR LOW RISK PATIENT: ICD-10-CM

## 2021-11-08 DIAGNOSIS — F32.A ANXIETY AND DEPRESSION: ICD-10-CM

## 2021-11-08 DIAGNOSIS — M21.372 LEFT FOOT DROP: ICD-10-CM

## 2021-11-08 PROCEDURE — 36415 COLL VENOUS BLD VENIPUNCTURE: CPT

## 2021-11-08 PROCEDURE — 99214 OFFICE O/P EST MOD 30 MIN: CPT | Performed by: FAMILY MEDICINE

## 2021-11-08 PROCEDURE — G8427 DOCREV CUR MEDS BY ELIG CLIN: HCPCS | Performed by: FAMILY MEDICINE

## 2021-11-08 PROCEDURE — 86803 HEPATITIS C AB TEST: CPT

## 2021-11-08 PROCEDURE — G8484 FLU IMMUNIZE NO ADMIN: HCPCS | Performed by: FAMILY MEDICINE

## 2021-11-08 PROCEDURE — G8419 CALC BMI OUT NRM PARAM NOF/U: HCPCS | Performed by: FAMILY MEDICINE

## 2021-11-08 PROCEDURE — 87389 HIV-1 AG W/HIV-1&-2 AB AG IA: CPT

## 2021-11-08 PROCEDURE — 1036F TOBACCO NON-USER: CPT | Performed by: FAMILY MEDICINE

## 2021-11-08 RX ORDER — DEXTROAMPHETAMINE SACCHARATE, AMPHETAMINE ASPARTATE MONOHYDRATE, DEXTROAMPHETAMINE SULFATE AND AMPHETAMINE SULFATE 3.75; 3.75; 3.75; 3.75 MG/1; MG/1; MG/1; MG/1
15 CAPSULE, EXTENDED RELEASE ORAL DAILY
Qty: 30 CAPSULE | Refills: 0 | Status: SHIPPED | OUTPATIENT
Start: 2021-11-08 | End: 2021-12-20 | Stop reason: SDUPTHER

## 2021-11-08 NOTE — PROGRESS NOTES
Provider, MD   TURMERIC PO Take by mouth   Yes Historical Provider, MD   FOLIC ACID PO Take 9,021 mg by mouth daily    Yes Historical Provider, MD   saline nasal gel (AYR) GEL by Nasal route as needed for Congestion 5/31/19  Yes Steven Willis,    Vitamin D-Vitamin K [facility]) 5500-200 UNIT-MCG TABS take 1 tablet by mouth daily 2/7/19  Yes Historical Provider, MD   acetaminophen (TYLENOL) 325 MG tablet Take 650 mg by mouth every 6 hours as needed. Yes Historical Provider, MD        Allergies:       Vicodin [hydrocodone-acetaminophen]    Physical Exam:     Vitals:  /70   Ht 5' 6.9\" (1.699 m)   Wt 181 lb (82.1 kg)   LMP 10/03/2017 (Exact Date)   BMI 28.43 kg/m²   Physical Exam  Vitals and nursing note reviewed. Constitutional:       General: She is not in acute distress. Appearance: Normal appearance. She is well-developed. She is not ill-appearing. Cardiovascular:      Rate and Rhythm: Normal rate and regular rhythm. Heart sounds: Normal heart sounds. Pulmonary:      Effort: Pulmonary effort is normal.      Breath sounds: Normal breath sounds. Neurological:      Mental Status: She is alert and oriented to person, place, and time.    Psychiatric:         Mood and Affect: Mood normal.         Behavior: Behavior normal.         Data:     Lab Results   Component Value Date     03/08/2021    K 3.7 03/08/2021     03/08/2021    CO2 28 03/08/2021    BUN 7 03/08/2021    CREATININE 0.85 06/16/2021    GLUCOSE 96 03/08/2021    PROT 7.0 03/08/2021    LABALBU 4.7 03/08/2021    BILITOT 0.45 03/08/2021    ALKPHOS 89 03/08/2021    AST 19 03/08/2021    ALT <5 03/08/2021     Lab Results   Component Value Date    WBC 8.7 03/08/2021    RBC 4.58 03/08/2021    HGB 12.9 03/08/2021    HCT 39.3 03/08/2021    MCV 85.8 03/08/2021    MCH 28.2 03/08/2021    MCHC 32.9 03/08/2021    RDW 13.6 03/08/2021     03/08/2021    MPV NOT REPORTED 03/08/2021     Lab Results   Component Value Date    TSH 1.28 03/08/2021     Lab Results   Component Value Date    CHOL 216 03/08/2021    HDL 60 03/08/2021         Assessment & Plan:        Diagnosis Orders   1. Chronic fatigue syndrome  amphetamine-dextroamphetamine (ADDERALL XR) 15 MG extended release capsule   2. Anxiety and depression     3. Left foot drop     4. Encounter for hepatitis C screening test for low risk patient  Hepatitis C Antibody   5. Screening for HIV (human immunodeficiency virus)  HIV Screen   CFS controlled, cont adderall same dosing  Anxiety & depression controlled, cont current rx  Chronic L foot drop, has seen neurosurgery and no intervention was recommended. Has AFO which is working well. Discussed shoes, can buy single shoes on [employer]. [employer] to get extra wide shoe and 2 different sizes. Requested Prescriptions     Signed Prescriptions Disp Refills    amphetamine-dextroamphetamine (ADDERALL XR) 15 MG extended release capsule 30 capsule 0     Sig: Take 1 capsule by mouth daily for 30 days. Patient Instructions   SURVEY:    You may be receiving a survey from [employer] regarding your visit today. You may get this in the mail, through your MyChart or in your email. Please complete the survey to enable us to provide the highest quality of care to you and your family. If you cannot score us as very good ( 5 Stars) on any question, please feel free to call the office to discuss how we could have made your experience exceptional.     Thank you. Clinical Care Team:  Dr. Chandrika Calvillo, DO Eric Cavazos, [address] [place] [address] Team:  Candace Theodore received counseling on the following healthy behaviors: medication adherence  Reviewed prior labs and health maintenance. Continue current medications, diet and exercise. Discussed use, benefit, and side effects of prescribed medications.  Barriers to medication compliance addressed. Patient given educational materials - see patient instructions. All patient questions answered.   Patient voiced understanding.     signed by Jameson Iqbal DO on 11/9/2021 at 10:20 PM  [address]  Dept: [phone]

## 2021-11-08 NOTE — PATIENT INSTRUCTIONS
SURVEY:    You may be receiving a survey from [employer] regarding your visit today. You may get this in the mail, through your MyChart or in your email. Please complete the survey to enable us to provide the highest quality of care to you and your family. If you cannot score us as very good ( 5 Stars) on any question, please feel free to call the office to discuss how we could have made your experience exceptional.     Thank you.     Clinical Care Team:  Dr. Ирина Martinez, DO Aquilino Stokes, [address] [place] [address] Team:  [address] [place] [address]

## 2021-11-09 LAB
HEPATITIS C ANTIBODY: NONREACTIVE
HIV AG/AB: NONREACTIVE

## 2021-12-09 ENCOUNTER — TELEPHONE (OUTPATIENT)
Dept: FAMILY MEDICINE CLINIC | Age: 51
End: 2021-12-09

## 2021-12-09 RX ORDER — BUPROPION HYDROCHLORIDE 150 MG/1
150 TABLET ORAL EVERY MORNING
Qty: 90 TABLET | Refills: 1 | Status: SHIPPED | OUTPATIENT
Start: 2021-12-09 | End: 2022-08-30 | Stop reason: SDUPTHER

## 2021-12-09 NOTE — TELEPHONE ENCOUNTER
Antony Carrillo said at her last appointment Dr. Osiris Hu had told her to stop taking one of her anti-depressants. She doesn't know what its called but thinks it starts with a N. She believes she needs to go back on it. She wants to speak with Armando Hicks.     Health Maintenance   Topic Date Due    Cervical cancer screen  Never done    Colon cancer screen colonoscopy  Never done    Flu vaccine (1) Never done    COVID-19 Vaccine (2 - Pfizer 3-dose booster series) 10/13/2021    Lipid screen  03/08/2026    DTaP/Tdap/Td vaccine (2 - Td or Tdap) 11/29/2028    Hepatitis C screen  Completed    HIV screen  Completed    Hepatitis A vaccine  Aged Out    Hepatitis B vaccine  Aged Out    Hib vaccine  Aged Out    Meningococcal (ACWY) vaccine  Aged Out    Pneumococcal 0-64 years Vaccine  Aged Out             (applicable per patient's age: Cancer Screenings, Depression Screening, Fall Risk Screening, Immunizations)    LDL Cholesterol (mg/dL)   Date Value   03/08/2021 130     AST (U/L)   Date Value   03/08/2021 19     ALT (U/L)   Date Value   03/08/2021 <5 (L)     BUN (mg/dL)   Date Value   03/08/2021 7      (goal A1C is < 7)   (goal LDL is <100) need 30-50% reduction from baseline     BP Readings from Last 3 Encounters:   11/08/21 120/70   10/18/21 119/69   10/08/21 120/60    (goal /80)      All Future Testing planned in [facility]:  Lab Frequency Next Occurrence   ME NJX DX/THER SBST INTRLMNR LMBR/SAC W/IMG GDN Once 04/23/2021       Next Visit Date:  Future Appointments   Date Time Provider Reginaldo Echavarria   2/8/2022  2:40 PM DO Lavern Duenas [facility]            Patient Active Problem List:     Anxiety     Insomnia     HTN (hypertension)     Venous insufficiency     Overweight

## 2021-12-20 ENCOUNTER — TELEPHONE (OUTPATIENT)
Dept: FAMILY MEDICINE CLINIC | Age: 51
End: 2021-12-20

## 2021-12-20 DIAGNOSIS — G93.32 CHRONIC FATIGUE SYNDROME: ICD-10-CM

## 2021-12-20 RX ORDER — DEXTROAMPHETAMINE SACCHARATE, AMPHETAMINE ASPARTATE MONOHYDRATE, DEXTROAMPHETAMINE SULFATE AND AMPHETAMINE SULFATE 3.75; 3.75; 3.75; 3.75 MG/1; MG/1; MG/1; MG/1
15 CAPSULE, EXTENDED RELEASE ORAL DAILY
Qty: 30 CAPSULE | Refills: 0 | Status: SHIPPED | OUTPATIENT
Start: 2021-12-20 | End: 2022-02-08 | Stop reason: SDUPTHER

## 2021-12-20 NOTE — TELEPHONE ENCOUNTER
----- Message from Heidi Perla sent at 12/18/2021  4:31 PM EST -----  Subject: Message to Provider    QUESTIONS  Information for Provider? Pt is requesting that the Adderall XR be   renewed. ---------------------------------------------------------------------------  --------------  [employer] INFO  What is the best way for the office to contact you? OK to leave message on   voicemail  Preferred Call Back Phone Number? [phone]  ---------------------------------------------------------------------------  --------------  SCRIPT ANSWERS  Relationship to Patient?  Self
rx sent to DM
(TYLENOL) 325 MG tablet Take 650 mg by mouth every 6 hours as needed.       Historical Provider, MD

## 2022-01-14 RX ORDER — PANTOPRAZOLE SODIUM 40 MG/1
40 TABLET, DELAYED RELEASE ORAL
Qty: 30 TABLET | Refills: 5 | Status: SHIPPED | OUTPATIENT
Start: 2022-01-14 | End: 2022-07-07

## 2022-01-14 RX ORDER — CHOLECALCIFEROL (VITD3)/VIT K2 137.5-2
TABLET ORAL
Qty: 90 TABLET | Refills: 1 | Status: SHIPPED | OUTPATIENT
Start: 2022-01-14 | End: 2022-08-10 | Stop reason: SDUPTHER

## 2022-01-14 NOTE — TELEPHONE ENCOUNTER
Send protonix in place omeprazole      Last visit:  11/8/2021  Next Visit Date:    Future Appointments   Date Time Provider Reginaldo Italia   2/8/2022  2:40 PM Mike Curran DO [facility]         Medication List:  Prior to Admission medications    Medication Sig Start Date End Date Taking? Authorizing Provider   amphetamine-dextroamphetamine (ADDERALL XR) 15 MG extended release capsule Take 1 capsule by mouth daily for 30 days. 12/20/21 1/19/22  Mike Curran DO   buPROPion (WELLBUTRIN XL) 150 MG extended release tablet Take 1 tablet by mouth every morning 12/9/21   Mike Curran DO   lamoTRIgine (LAMICTAL) 25 MG tablet take 1 tablet by mouth twice a day 10/6/21   Mike Curran,    omeprazole (PRILOSEC) 40 MG delayed release capsule take 1 capsule by mouth every morning before breakfast 8/24/21   Mike Curran DO   gabapentin (NEURONTIN) 300 MG capsule 300 mg every morning, 300 mg every afternoon, 600 mg every evening at bedtime 8/24/21 1/5/22  Mike Curran DO   citalopram (CELEXA) 40 MG tablet take 1 tablet by mouth once daily 6/8/21   Mike Curran DO   clobetasol (TEMOVATE) 0.05 % cream apply to affected area twice a day for 30 DAYS then if needed 3/18/21   Historical Provider, MD   estradiol (ESTRACE) 0.1 MG/GM vaginal cream apply ONE PEA SIZE DOLLOP ON EXTERNAL VAGINAL AREA AND SWIPE INTO. ..  (REFER TO PRESCRIPTION NOTES).  3/18/21   Historical Provider, MD   diclofenac sodium (VOLTAREN) 1 % GEL Apply topically 2 times daily    Historical Provider, MD   Multiple Vitamins-Minerals (HAIR SKIN AND NAILS FORMULA PO) Take by mouth    Historical Provider, MD   TURMERIC PO Take by mouth    Historical Provider, MD   FOLIC ACID PO Take 3,396 mg by mouth daily     Historical Provider, MD   saline nasal gel (AYR) GEL by Nasal route as needed for Congestion 5/31/19   Mike Curran DO   Vitamin D-Vitamin K [facility]) 5500-200 UNIT-MCG TABS take 1 tablet by mouth daily 2/7/19 Historical Provider, MD   acetaminophen (TYLENOL) 325 MG tablet Take 650 mg by mouth every 6 hours as needed.       Historical Provider, MD

## 2022-02-08 ENCOUNTER — OFFICE VISIT (OUTPATIENT)
Dept: FAMILY MEDICINE CLINIC | Age: 52
End: 2022-02-08
Payer: COMMERCIAL

## 2022-02-08 VITALS
OXYGEN SATURATION: 99 % | WEIGHT: 181 LBS | SYSTOLIC BLOOD PRESSURE: 130 MMHG | BODY MASS INDEX: 28.41 KG/M2 | HEIGHT: 67 IN | HEART RATE: 72 BPM | DIASTOLIC BLOOD PRESSURE: 80 MMHG

## 2022-02-08 DIAGNOSIS — F33.1 MODERATE EPISODE OF RECURRENT MAJOR DEPRESSIVE DISORDER (HCC): ICD-10-CM

## 2022-02-08 DIAGNOSIS — G47.19 EXCESSIVE DAYTIME SLEEPINESS: Primary | ICD-10-CM

## 2022-02-08 DIAGNOSIS — G93.32 CHRONIC FATIGUE SYNDROME: ICD-10-CM

## 2022-02-08 DIAGNOSIS — G47.61 PERIODIC LIMB MOVEMENT: ICD-10-CM

## 2022-02-08 PROCEDURE — 99214 OFFICE O/P EST MOD 30 MIN: CPT | Performed by: FAMILY MEDICINE

## 2022-02-08 PROCEDURE — 1036F TOBACCO NON-USER: CPT | Performed by: FAMILY MEDICINE

## 2022-02-08 PROCEDURE — G8419 CALC BMI OUT NRM PARAM NOF/U: HCPCS | Performed by: FAMILY MEDICINE

## 2022-02-08 PROCEDURE — G8427 DOCREV CUR MEDS BY ELIG CLIN: HCPCS | Performed by: FAMILY MEDICINE

## 2022-02-08 PROCEDURE — G8484 FLU IMMUNIZE NO ADMIN: HCPCS | Performed by: FAMILY MEDICINE

## 2022-02-08 RX ORDER — MAGNESIUM OXIDE 400 MG/1
400 TABLET ORAL NIGHTLY
Qty: 30 TABLET | Refills: 5 | Status: SHIPPED | OUTPATIENT
Start: 2022-02-08 | End: 2022-08-30 | Stop reason: SDUPTHER

## 2022-02-08 RX ORDER — GABAPENTIN 300 MG/1
CAPSULE ORAL
Qty: 120 CAPSULE | Refills: 2 | Status: SHIPPED | OUTPATIENT
Start: 2022-02-08 | End: 2022-08-10 | Stop reason: SDUPTHER

## 2022-02-08 RX ORDER — CITALOPRAM 40 MG/1
TABLET ORAL
Qty: 90 TABLET | Refills: 1 | Status: SHIPPED | OUTPATIENT
Start: 2022-02-08 | End: 2022-02-09

## 2022-02-08 RX ORDER — DEXTROAMPHETAMINE SACCHARATE, AMPHETAMINE ASPARTATE MONOHYDRATE, DEXTROAMPHETAMINE SULFATE AND AMPHETAMINE SULFATE 3.75; 3.75; 3.75; 3.75 MG/1; MG/1; MG/1; MG/1
15 CAPSULE, EXTENDED RELEASE ORAL DAILY
Qty: 30 CAPSULE | Refills: 0 | Status: SHIPPED | OUTPATIENT
Start: 2022-02-08 | End: 2022-03-07

## 2022-02-08 ASSESSMENT — PATIENT HEALTH QUESTIONNAIRE - PHQ9
5. POOR APPETITE OR OVEREATING: 0
6. FEELING BAD ABOUT YOURSELF - OR THAT YOU ARE A FAILURE OR HAVE LET YOURSELF OR YOUR FAMILY DOWN: 0
9. THOUGHTS THAT YOU WOULD BE BETTER OFF DEAD, OR OF HURTING YOURSELF: 0
3. TROUBLE FALLING OR STAYING ASLEEP: 0
2. FEELING DOWN, DEPRESSED OR HOPELESS: 0
SUM OF ALL RESPONSES TO PHQ QUESTIONS 1-9: 0
1. LITTLE INTEREST OR PLEASURE IN DOING THINGS: 0
8. MOVING OR SPEAKING SO SLOWLY THAT OTHER PEOPLE COULD HAVE NOTICED. OR THE OPPOSITE, BEING SO FIGETY OR RESTLESS THAT YOU HAVE BEEN MOVING AROUND A LOT MORE THAN USUAL: 0
10. IF YOU CHECKED OFF ANY PROBLEMS, HOW DIFFICULT HAVE THESE PROBLEMS MADE IT FOR YOU TO DO YOUR WORK, TAKE CARE OF THINGS AT HOME, OR GET ALONG WITH OTHER PEOPLE: 0
SUM OF ALL RESPONSES TO PHQ9 QUESTIONS 1 & 2: 0
SUM OF ALL RESPONSES TO PHQ QUESTIONS 1-9: 0
7. TROUBLE CONCENTRATING ON THINGS, SUCH AS READING THE NEWSPAPER OR WATCHING TELEVISION: 0
4. FEELING TIRED OR HAVING LITTLE ENERGY: 0

## 2022-02-08 NOTE — PROGRESS NOTES
Name: Kristina Fernando  : 1970         Chief Complaint:     Chief Complaint   Patient presents with    Anxiety    Fatigue     chronic       History of Present Illness:      Kristina Fernando is a 54 y.o.  female who presents with Anxiety and Fatigue (chronic)      HPI    Patient complains of drowsiness, sleeping ok at night but still very tired during the day, struggling to stay awake sometimes. Had been better while on adderall but ran out a couple wks ago. Had done well while on it. She does not believe any of her meds because excessive sleepiness. Takes most meds at night. Gabapentin she sometimes only takes 1 tab at night so that she can take an extra pill during the day d/t R hip bothering her more lately. Sometimes takes tramadol for the hip pain too. No snoring or other breathing trouble at night but has been told she \"jerks\" at night. She doesn't notice it. Was having trouble with depression and had to restart wellbutrin, which she had stopped taking. Without it she was wanting to stay in bed, feeling very apathetic. More duties at work, working at [employer] as well as cooking and cleaning. Feeling better now. Medical History:     Patient Active Problem List   Diagnosis    Anxiety    Insomnia    HTN (hypertension)    Venous insufficiency    Overweight       Medications:       Prior to Admission medications    Medication Sig Start Date End Date Taking? Authorizing Provider   citalopram (CELEXA) 40 MG tablet take 1 tablet by mouth once daily 22  Yes Rose Mary Mccarthy DO   amphetamine-dextroamphetamine (ADDERALL XR) 15 MG extended release capsule Take 1 capsule by mouth daily for 30 days.  2/8/22 3/10/22 Yes Rose Mary Mccarthy DO   gabapentin (NEURONTIN) 300 MG capsule 300 mg every morning, 300 mg every afternoon, 600 mg every evening at bedtime 22 Yes Rose Mary Mccarthy DO   magnesium oxide (MAG-OX) 400 MG tablet Take 1 tablet by mouth at bedtime 22  Yes Amaris LORENZO Joan Sarabia, DO   Vitamin D-Vitamin K [facility]) 5500-200 UNIT-MCG TABS take 1 tablet by mouth daily 1/14/22  Yes Sunny Tucker DO   pantoprazole (PROTONIX) 40 MG tablet Take 1 tablet by mouth every morning (before breakfast) 1/14/22  Yes Sunny Tucker DO   buPROPion (WELLBUTRIN XL) 150 MG extended release tablet Take 1 tablet by mouth every morning 12/9/21  Yes Sunny Tucker DO   lamoTRIgine (LAMICTAL) 25 MG tablet take 1 tablet by mouth twice a day 10/6/21  Yes Sunny Tucker DO   clobetasol (TEMOVATE) 0.05 % cream apply to affected area twice a day for 30 DAYS then if needed 3/18/21  Yes Historical Provider, MD   estradiol (ESTRACE) 0.1 MG/GM vaginal cream apply ONE PEA SIZE DOLLOP ON EXTERNAL VAGINAL AREA AND SWIPE INTO. ..  (REFER TO PRESCRIPTION NOTES). 3/18/21  Yes Historical Provider, MD   diclofenac sodium (VOLTAREN) 1 % GEL Apply topically 2 times daily   Yes Historical Provider, MD   Multiple Vitamins-Minerals (HAIR SKIN AND NAILS FORMULA PO) Take by mouth   Yes Historical Provider, MD   TURMERIC PO Take by mouth   Yes Historical Provider, MD   FOLIC ACID PO Take 6,401 mg by mouth daily    Yes Historical Provider, MD   saline nasal gel (AYR) GEL by Nasal route as needed for Congestion 5/31/19  Yes Sunny Tucker DO   acetaminophen (TYLENOL) 325 MG tablet Take 650 mg by mouth every 6 hours as needed. Yes Historical Provider, MD        Allergies:       Vicodin [hydrocodone-acetaminophen]    Physical Exam:     Vitals:  /80   Pulse 72   Ht 5' 6.9\" (1.699 m)   Wt 181 lb (82.1 kg)   LMP 10/03/2017 (Exact Date)   SpO2 99%   BMI 28.43 kg/m²   Physical Exam  Vitals and nursing note reviewed. Constitutional:       General: She is not in acute distress. Appearance: Normal appearance. She is well-developed. She is not ill-appearing. Cardiovascular:      Rate and Rhythm: Normal rate and regular rhythm. Heart sounds: Normal heart sounds.    Pulmonary:      Effort: Pulmonary effort is normal.      Breath sounds: Normal breath sounds. Neurological:      Mental Status: She is alert and oriented to person, place, and time. Psychiatric:         Mood and Affect: Mood normal.         Behavior: Behavior normal.         Data:     Lab Results   Component Value Date     03/08/2021    K 3.7 03/08/2021     03/08/2021    CO2 28 03/08/2021    BUN 7 03/08/2021    CREATININE 0.85 06/16/2021    GLUCOSE 96 03/08/2021    PROT 7.0 03/08/2021    LABALBU 4.7 03/08/2021    BILITOT 0.45 03/08/2021    ALKPHOS 89 03/08/2021    AST 19 03/08/2021    ALT <5 03/08/2021     Lab Results   Component Value Date    WBC 8.7 03/08/2021    RBC 4.58 03/08/2021    HGB 12.9 03/08/2021    HCT 39.3 03/08/2021    MCV 85.8 03/08/2021    MCH 28.2 03/08/2021    MCHC 32.9 03/08/2021    RDW 13.6 03/08/2021     03/08/2021    MPV NOT REPORTED 03/08/2021     Lab Results   Component Value Date    TSH 1.28 03/08/2021     Lab Results   Component Value Date    CHOL 216 03/08/2021    HDL 60 03/08/2021         Assessment & Plan:        Diagnosis Orders   1. Excessive daytime sleepiness  Baseline Diagnostic Sleep Study   2. Chronic fatigue syndrome  amphetamine-dextroamphetamine (ADDERALL XR) 15 MG extended release capsule   3. Periodic limb movement  Baseline Diagnostic Sleep Study   4. Moderate episode of recurrent major depressive disorder (HCC)     Extreme fatigue, does have some seemingly abnormal movements during sleep though no breathing trouble. Patient unaware of arousals during the night. However, she is extremely tired during the day. This was helped somewhat by Adderall so we will restart that. Advised patient that she just needs to call for refills between appointments. Sleep study ordered to assess for periodic limb movement disorder or any other sleep pathology.   With regard to the limb movements, I am reluctant to start Mirapex or Requip with her already on gabapentin, Celexa, Wellbutrin, and Lamictal.  However, if she does have confirmed PLMD, we would consider adjusting gabapentin dosing or trial of another med. Adding magnesium oxide at bedtime. Depression controlled, continue current Rx  Follow-up 3 months        Requested Prescriptions     Signed Prescriptions Disp Refills    citalopram (CELEXA) 40 MG tablet 90 tablet 1     Sig: take 1 tablet by mouth once daily    amphetamine-dextroamphetamine (ADDERALL XR) 15 MG extended release capsule 30 capsule 0     Sig: Take 1 capsule by mouth daily for 30 days.  gabapentin (NEURONTIN) 300 MG capsule 120 capsule 2     Si mg every morning, 300 mg every afternoon, 600 mg every evening at bedtime    magnesium oxide (MAG-OX) 400 MG tablet 30 tablet 5     Sig: Take 1 tablet by mouth at bedtime         There are no Patient Instructions on file for this visit. Margareth Cross received counseling on the following healthy behaviors: medication adherence  Reviewed prior labs and health maintenance. Continue current medications, diet and exercise. Discussed use, benefit, and side effects of prescribed medications. Barriers to medication compliance addressed. Patient given educational materials - see patient instructions. All patient questions answered.   Patient voiced understanding.     signed by Rose Mary Mccarthy DO on 2022 at 8:53 AM  [address]  Dept: [phone]

## 2022-02-09 RX ORDER — CITALOPRAM 40 MG/1
TABLET ORAL
Qty: 90 TABLET | Refills: 1 | Status: SHIPPED | OUTPATIENT
Start: 2022-02-09 | End: 2022-08-10 | Stop reason: SDUPTHER

## 2022-02-21 ENCOUNTER — TELEPHONE (OUTPATIENT)
Dept: FAMILY MEDICINE CLINIC | Age: 52
End: 2022-02-21

## 2022-02-21 NOTE — TELEPHONE ENCOUNTER
Wonder if she ate a lot of sodium over the weekend? Recommend drinking extra water and giving things some time.

## 2022-02-21 NOTE — TELEPHONE ENCOUNTER
Patient woke up this morning with her hands very swelled and her leg with her brace is swelled can she take a diuretic to help?

## 2022-02-21 NOTE — TELEPHONE ENCOUNTER
----- Message from Donita South sent at 2/21/2022 12:51 PM EST -----  Subject: Message to Provider    QUESTIONS  Information for Provider? Pt is retaining water in her feet and hands, Pt   would like to speak with a nurse or her provider Andrew Hanna. ---------------------------------------------------------------------------  --------------  Natalia YEN  What is the best way for the office to contact you? OK to leave message on   voicemail  Preferred Call Back Phone Number? [phone]  ---------------------------------------------------------------------------  --------------  SCRIPT ANSWERS  Relationship to Patient?  Self

## 2022-03-07 DIAGNOSIS — G93.32 CHRONIC FATIGUE SYNDROME: ICD-10-CM

## 2022-03-07 RX ORDER — DEXTROAMPHETAMINE SACCHARATE, AMPHETAMINE ASPARTATE MONOHYDRATE, DEXTROAMPHETAMINE SULFATE AND AMPHETAMINE SULFATE 3.75; 3.75; 3.75; 3.75 MG/1; MG/1; MG/1; MG/1
CAPSULE, EXTENDED RELEASE ORAL
Qty: 30 CAPSULE | Refills: 0 | Status: SHIPPED | OUTPATIENT
Start: 2022-03-07 | End: 2022-03-31 | Stop reason: SDUPTHER

## 2022-03-07 NOTE — TELEPHONE ENCOUNTER
----- Message from Andreas Hillman sent at 3/7/2022 12:42 PM EST -----  Subject: Refill Request    QUESTIONS  Name of Medication? amphetamine-dextroamphetamine (ADDERALL XR) 15 MG   extended release capsule  Patient-reported dosage and instructions? 1 x daily   How many days do you have left? 4  Preferred Pharmacy? [address] phone number (if available)? [phone]  Additional Information for Provider? Pt only has 4 days left and would   like the medication sent to [facility]  ---------------------------------------------------------------------------  --------------  [address]  What is the best way for the office to contact you? OK to leave message on   voicemail  Preferred Call Back Phone Number?  [phone]

## 2022-03-07 NOTE — TELEPHONE ENCOUNTER
Last visit:  2/8/2022  Next Visit Date:    Future Appointments   Date Time Provider Reginaldo Echavarria   3/30/2022  8:15 PM Jing, [address] HOD   5/9/2022  3:20 PM DO Mercedes Dexter [facility]         Medication List:  Prior to Admission medications    Medication Sig Start Date End Date Taking? Authorizing Provider   citalopram (CELEXA) 40 MG tablet take 1 tablet by mouth once daily 2/9/22   Kendall Farias, DO   amphetamine-dextroamphetamine (ADDERALL XR) 15 MG extended release capsule Take 1 capsule by mouth daily for 30 days. 2/8/22 3/10/22  Kendall Farias, DO   gabapentin (NEURONTIN) 300 MG capsule 300 mg every morning, 300 mg every afternoon, 600 mg every evening at bedtime 2/8/22 7/26/22  Kendall Farias, DO   magnesium oxide (MAG-OX) 400 MG tablet Take 1 tablet by mouth at bedtime 2/8/22   Kendall Farias, DO   Vitamin D-Vitamin K [facility]) 5500-200 UNIT-MCG TABS take 1 tablet by mouth daily 1/14/22   Kendall Farias, DO   pantoprazole (PROTONIX) 40 MG tablet Take 1 tablet by mouth every morning (before breakfast) 1/14/22   Kendall Farias, DO   buPROPion (WELLBUTRIN XL) 150 MG extended release tablet Take 1 tablet by mouth every morning 12/9/21   Kendall Farias, DO   lamoTRIgine (LAMICTAL) 25 MG tablet take 1 tablet by mouth twice a day 10/6/21   Kendall Farias, DO   clobetasol (TEMOVATE) 0.05 % cream apply to affected area twice a day for 30 DAYS then if needed 3/18/21   Historical Provider, MD   estradiol (ESTRACE) 0.1 MG/GM vaginal cream apply ONE PEA SIZE DOLLOP ON EXTERNAL VAGINAL AREA AND SWIPE INTO. ..  (REFER TO PRESCRIPTION NOTES).  3/18/21   Historical Provider, MD   diclofenac sodium (VOLTAREN) 1 % GEL Apply topically 2 times daily    Historical Provider, MD   Multiple Vitamins-Minerals (HAIR SKIN AND NAILS FORMULA PO) Take by mouth    Historical Provider, MD   TURMERIC PO Take by mouth    Historical Provider, MD   FOLIC ACID PO Take 0,254 mg by mouth daily     Historical Provider, MD   saline nasal gel (AYR) GEL by Nasal route as needed for Congestion 5/31/19   Sarahy Post DO   acetaminophen (TYLENOL) 325 MG tablet Take 650 mg by mouth every 6 hours as needed.       Historical Provider, MD

## 2022-03-11 DIAGNOSIS — G93.32 CHRONIC FATIGUE SYNDROME: ICD-10-CM

## 2022-03-11 RX ORDER — DEXTROAMPHETAMINE SACCHARATE, AMPHETAMINE ASPARTATE MONOHYDRATE, DEXTROAMPHETAMINE SULFATE AND AMPHETAMINE SULFATE 3.75; 3.75; 3.75; 3.75 MG/1; MG/1; MG/1; MG/1
CAPSULE, EXTENDED RELEASE ORAL
Qty: 30 CAPSULE | Refills: 0 | OUTPATIENT
Start: 2022-03-11

## 2022-03-11 NOTE — TELEPHONE ENCOUNTER
Last visit:  2/8/2022  Next Visit Date:    Future Appointments   Date Time Provider Reginaldo Echavarria   3/30/2022  8:15 PM Jing, [address],[place] HOD   5/9/2022  3:20 PM DO Liya Foy [facility] [[email or web address]]         Medication List:  Prior to Admission medications    Medication Sig Start Date End Date Taking? Authorizing Provider   amphetamine-dextroamphetamine (ADDERALL XR) 15 MG extended release capsule TAKE 1 CAPSULE BY MOUTH DAILY 3/7/22 4/6/22  Luis A Pérez MD   citalopram (CELEXA) 40 MG tablet take 1 tablet by mouth once daily 2/9/22   Elvi Alvares DO   gabapentin (NEURONTIN) 300 MG capsule 300 mg every morning, 300 mg every afternoon, 600 mg every evening at bedtime 2/8/22 7/26/22  Elvi Alvares DO   magnesium oxide (MAG-OX) 400 MG tablet Take 1 tablet by mouth at bedtime 2/8/22   Elvi Alvares DO   Vitamin D-Vitamin K [facility]) 5500-200 UNIT-MCG TABS take 1 tablet by mouth daily 1/14/22   Elvi Alvares DO   pantoprazole (PROTONIX) 40 MG tablet Take 1 tablet by mouth every morning (before breakfast) 1/14/22   Elvi Alvares DO   buPROPion (WELLBUTRIN XL) 150 MG extended release tablet Take 1 tablet by mouth every morning 12/9/21   Elvi Alvares DO   lamoTRIgine (LAMICTAL) 25 MG tablet take 1 tablet by mouth twice a day 10/6/21   Elvi Alvares DO   clobetasol (TEMOVATE) 0.05 % cream apply to affected area twice a day for 30 DAYS then if needed 3/18/21   Historical Provider, MD   estradiol (ESTRACE) 0.1 MG/GM vaginal cream apply ONE PEA SIZE DOLLOP ON EXTERNAL VAGINAL AREA AND SWIPE INTO. ..  (REFER TO PRESCRIPTION NOTES).  3/18/21   Historical Provider, MD   diclofenac sodium (VOLTAREN) 1 % GEL Apply topically 2 times daily    Historical Provider, MD   Multiple Vitamins-Minerals (HAIR SKIN AND NAILS FORMULA PO) Take by mouth    Historical Provider, MD   TURMERIC PO Take by mouth    Historical Provider, MD   FOLIC ACID PO Take 6,107 mg by mouth daily Historical Provider, MD   saline nasal gel (AYR) GEL by Nasal route as needed for Congestion 5/31/19   Caitlin Donahue,    acetaminophen (TYLENOL) 325 MG tablet Take 650 mg by mouth every 6 hours as needed.       Historical Provider, MD

## 2022-03-11 NOTE — TELEPHONE ENCOUNTER
Per DANNI she [facility]. Please call [employer] to verify and let me know what you find out. Thanks!

## 2022-03-11 NOTE — TELEPHONE ENCOUNTER
,  [facility] for   I did call patient and told [facility].    She said thank you

## 2022-03-11 NOTE — TELEPHONE ENCOUNTER
----- Message from Kin Windy sent at 3/10/2022  5:52 PM EST -----  Subject: Refill Request    QUESTIONS  Name of Medication? amphetamine-dextroamphetamine (ADDERALL XR) 15 MG   extended release capsule  Patient-reported dosage and instructions? 1 tablet daily  How many days do you have left? 0  Preferred Pharmacy? [id number]  Pharmacy phone number (if available)? [phone]  ---------------------------------------------------------------------------  --------------  CALL BACK INFO  What is the best way for the office to contact you? OK to leave message on   voicemail  Preferred Call Back Phone Number?  [phone]

## 2022-03-30 ENCOUNTER — HOSPITAL ENCOUNTER (OUTPATIENT)
Dept: SLEEP CENTER | Age: 52
Discharge: HOME | End: 2022-03-30
Payer: COMMERCIAL

## 2022-03-30 DIAGNOSIS — G47.19 EXCESSIVE DAYTIME SLEEPINESS: ICD-10-CM

## 2022-03-30 DIAGNOSIS — G47.61 PERIODIC LIMB MOVEMENT: ICD-10-CM

## 2022-03-30 PROCEDURE — 95810 POLYSOM 6/> YRS 4/> PARAM: CPT

## 2022-03-30 ASSESSMENT — SLEEP AND FATIGUE QUESTIONNAIRES
HOW LIKELY ARE YOU TO NOD OFF OR FALL ASLEEP WHILE SITTING QUIETLY AFTER LUNCH WITHOUT ALCOHOL: 3
HOW LIKELY ARE YOU TO NOD OFF OR FALL ASLEEP WHILE SITTING AND READING: 3
NECK CIRCUMFERENCE (INCHES): 13
HOW LIKELY ARE YOU TO NOD OFF OR FALL ASLEEP WHEN YOU ARE A PASSENGER IN A CAR FOR AN HOUR WITHOUT A BREAK: 3
ESS TOTAL SCORE: 21
HOW LIKELY ARE YOU TO NOD OFF OR FALL ASLEEP WHILE SITTING INACTIVE IN A PUBLIC PLACE: 3
HOW LIKELY ARE YOU TO NOD OFF OR FALL ASLEEP WHILE WATCHING TV: 3
HOW LIKELY ARE YOU TO NOD OFF OR FALL ASLEEP IN A CAR, WHILE STOPPED FOR A FEW MINUTES IN TRAFFIC: 2
HOW LIKELY ARE YOU TO NOD OFF OR FALL ASLEEP WHILE SITTING AND TALKING TO SOMEONE: 2
HOW LIKELY ARE YOU TO NOD OFF OR FALL ASLEEP WHILE LYING DOWN TO REST IN THE AFTERNOON WHEN CIRCUMSTANCES PERMIT: 2

## 2022-03-31 ENCOUNTER — OFFICE VISIT (OUTPATIENT)
Dept: FAMILY MEDICINE CLINIC | Age: 52
End: 2022-03-31
Payer: COMMERCIAL

## 2022-03-31 VITALS
HEART RATE: 77 BPM | SYSTOLIC BLOOD PRESSURE: 128 MMHG | BODY MASS INDEX: 27.89 KG/M2 | HEIGHT: 68 IN | WEIGHT: 184 LBS | DIASTOLIC BLOOD PRESSURE: 72 MMHG | OXYGEN SATURATION: 98 %

## 2022-03-31 DIAGNOSIS — R20.0 RIGHT UPPER EXTREMITY NUMBNESS: Primary | ICD-10-CM

## 2022-03-31 PROCEDURE — G8427 DOCREV CUR MEDS BY ELIG CLIN: HCPCS | Performed by: FAMILY MEDICINE

## 2022-03-31 PROCEDURE — G8484 FLU IMMUNIZE NO ADMIN: HCPCS | Performed by: FAMILY MEDICINE

## 2022-03-31 PROCEDURE — G8419 CALC BMI OUT NRM PARAM NOF/U: HCPCS | Performed by: FAMILY MEDICINE

## 2022-03-31 PROCEDURE — 99213 OFFICE O/P EST LOW 20 MIN: CPT | Performed by: FAMILY MEDICINE

## 2022-03-31 PROCEDURE — 1036F TOBACCO NON-USER: CPT | Performed by: FAMILY MEDICINE

## 2022-03-31 RX ORDER — DEXTROAMPHETAMINE SACCHARATE, AMPHETAMINE ASPARTATE MONOHYDRATE, DEXTROAMPHETAMINE SULFATE AND AMPHETAMINE SULFATE 3.75; 3.75; 3.75; 3.75 MG/1; MG/1; MG/1; MG/1
CAPSULE, EXTENDED RELEASE ORAL
Qty: 30 CAPSULE | Refills: 0 | Status: SHIPPED | OUTPATIENT
Start: 2022-04-06 | End: 2022-05-31 | Stop reason: SDUPTHER

## 2022-03-31 RX ORDER — NAPROXEN 500 MG/1
500 TABLET ORAL 2 TIMES DAILY WITH MEALS
Qty: 28 TABLET | Refills: 0 | Status: SHIPPED | OUTPATIENT
Start: 2022-03-31 | End: 2022-08-10 | Stop reason: SDUPTHER

## 2022-03-31 RX ORDER — LAMOTRIGINE 25 MG/1
TABLET ORAL
Qty: 60 TABLET | Refills: 5 | Status: SHIPPED | OUTPATIENT
Start: 2022-03-31 | End: 2022-08-30 | Stop reason: SDUPTHER

## 2022-03-31 NOTE — PATIENT INSTRUCTIONS
SURVEY:    You may be receiving a survey from [employer] regarding your visit today. You may get this in the mail, through your MyChart or in your email. Please complete the survey to enable us to provide the highest quality of care to you and your family. If you cannot score us as very good ( 5 Stars) on any question, please feel free to call the office to discuss how we could have made your experience exceptional.     Thank you. Clinical Care Team:  Dr. Bill Wallace, DO Radha Serra, [address] [place] [address] Team:  [facility]    Patient Education        Carpal Tunnel Syndrome: Exercises  Introduction  Here are some examples of exercises for you to try. The exercises may be suggested for a condition or for rehabilitation. Start each exercise slowly. Ease off the exercises if you start to have pain. You will be told when to start these exercises and which ones will work bestfor you. Warm-up stretches  When you no longer have pain or numbness, you can do exercises to help prevent carpal tunnel syndrome from coming back. Do not do any stretch or movement thatis uncomfortable or painful. 1. Rotate your wrist up, down, and from side to side. Repeat 4 times. 2. Stretch your fingers far apart. Relax them, and then stretch them again. Repeat 4 times. 3. Stretch your thumb by pulling it back gently, holding it, and then releasing it. Repeat 4 times. How to do the exercises  Prayer stretch    1. Start with your palms together in front of your chest just below your chin. 2. Slowly lower your hands toward your waistline, keeping your hands close to your stomach and your palms together until you feel a mild to moderate stretch under your forearms. 3. Hold for at least 15 to 30 seconds. Repeat 2 to 4 times. Wrist flexor stretch    1. Extend your arm in front of you with your palm up.   2. [place] your wrist, pointing your hand toward the floor. 3. With your other hand, gently bend your wrist farther until you feel a mild to moderate stretch in your forearm. 4. Hold for at least 15 to 30 seconds. Repeat 2 to 4 times. Wrist extensor stretch    1. Repeat steps 1 through 4 of the stretch above, but begin with your extended hand palm down. Follow-up care is a key part of your treatment and safety. Be sure to make and go to all appointments, and call your doctor if you are having problems. It's also a good idea to know your test results and keep alist of the medicines you take. Where can you learn more? Go to https://[employer]. org and sign in to your [employer] account. Enter R663 in the [facility] box to learn more about \"Carpal Tunnel Syndrome: Exercises. \"     If you do not have an account, please click on the \"Sign Up Now\" link. Current as of: July 1, 2021               Content Version: 13.2  © 2006-2022 [employer]. Care instructions adapted under license by [facility] ([facility]). If you have questions about a medical condition or this instruction, always ask your healthcare professional. [place] [address] any warranty or liability for your use of this information.

## 2022-03-31 NOTE — PROGRESS NOTES
Name: Anton Null  : 1970         Chief Complaint:     Chief Complaint   Patient presents with    Arm Pain     right arm numbness and tingling when she sleeps       History of Present Illness:      Anton Null is a 54 y.o.  female who presents with Arm Pain (right arm numbness and tingling when she sleeps)      HPI    About 3 wks has had N/T in RUE overnight, starts in shoulder and goes the whole way down. Worst in thumb and index finger and index finger gets stiff when it happens. Whole arm is weak when it happens. No daytime symptoms, though sometimes in the morning it can take a long time for feeling & strength to return after she wakes up - reports it took 3h one day for the arm to feel normal.    Medical History:     Patient Active Problem List   Diagnosis    Anxiety    Insomnia    HTN (hypertension)    Venous insufficiency    Overweight       Medications:       Prior to Admission medications    Medication Sig Start Date End Date Taking?  Authorizing Provider   naproxen (EC-NAPROSYN) 500 MG EC tablet Take 1 tablet by mouth 2 times daily (with meals) for 14 days 3/31/22 4/14/22 Yes Mike Curran DO   amphetamine-dextroamphetamine (ADDERALL XR) 15 MG extended release capsule TAKE 1 CAPSULE BY MOUTH DAILY 22 Yes Mike Curran DO   lamoTRIgine (LAMICTAL) 25 MG tablet take 1 tablet by mouth twice a day 3/31/22  Yes Mike Curran DO   citalopram (CELEXA) 40 MG tablet take 1 tablet by mouth once daily 22  Terri Curran DO   gabapentin (NEURONTIN) 300 MG capsule 300 mg every morning, 300 mg every afternoon, 600 mg every evening at bedtime 22 Yes Mike Curran DO   magnesium oxide (MAG-OX) 400 MG tablet Take 1 tablet by mouth at bedtime 22  Yes Mike Curran DO   Vitamin D-Vitamin K [facility]) 5500-200 UNIT-MCG TABS take 1 tablet by mouth daily 22  Yes Mike Curran DO   pantoprazole (PROTONIX) 40 MG tablet Take 1 tablet by mouth every morning (before breakfast) 1/14/22  Yes Natasha Pelletier DO   buPROPion (WELLBUTRIN XL) 150 MG extended release tablet Take 1 tablet by mouth every morning 12/9/21  Yes Natasha Pelletier DO   clobetasol (TEMOVATE) 0.05 % cream apply to affected area twice a day for 30 DAYS then if needed 3/18/21  Yes Historical Provider, MD   estradiol (ESTRACE) 0.1 MG/GM vaginal cream apply ONE PEA SIZE DOLLOP ON EXTERNAL VAGINAL AREA AND SWIPE INTO. ..  (REFER TO PRESCRIPTION NOTES). 3/18/21  Yes Historical Provider, MD   diclofenac sodium (VOLTAREN) 1 % GEL Apply topically 2 times daily   Yes Historical Provider, MD   Multiple Vitamins-Minerals (HAIR SKIN AND NAILS FORMULA PO) Take by mouth   Yes Historical Provider, MD   TURMERIC PO Take by mouth   Yes Historical Provider, MD   FOLIC ACID PO Take 6,961 mg by mouth daily    Yes Historical Provider, MD   saline nasal gel (AYR) GEL by Nasal route as needed for Congestion 5/31/19  Yes Natasha Pelletier DO   acetaminophen (TYLENOL) 325 MG tablet Take 650 mg by mouth every 6 hours as needed. Yes Historical Provider, MD        Allergies:       Vicodin [hydrocodone-acetaminophen]    Physical Exam:     Vitals:  /72   Pulse 77   Ht 5' 8\" (1.727 m)   Wt 184 lb (83.5 kg)   LMP 10/03/2017 (Exact Date)   SpO2 98%   BMI 27.98 kg/m²   Physical Exam  Musculoskeletal:      Comments: Neg tinel, pos phalen, neg spurling. Mild thenar atrophy of R hand but strength intact. Sensation of R hand WNL. Right shoulder normal range of motion and strength. Pain with empty can and Leo. Mild hypertonicity L T4-6 area, nothing unusual on R. Cervical lordosis WNL. Psychiatric:         Mood and Affect: Mood normal.         Behavior: Behavior normal.         Assessment & Plan:        Diagnosis Orders   1.  Right upper extremity numbness       Nocturnal symptoms in the right upper extremity, definitely starts at the shoulder, indicated posterior lateral proximal humeral area, goes through the extremity and is worst at the thumb and index finger. Negative Spurling test and she does have some physical exam findings of carpal tunnel including mild thenar atrophy and positive Phalen's test.  Differential diagnosis carpal tunnel syndrome with significant retrograde symptoms, C5-6 radiculopathy, brachial plexus pathology. Advised trying over-the-counter wrist splint and wearing that overnight. Naproxen twice a day for 2 weeks. May try chiropractic treatment. Exercises as below. Call if not improving and would order EMG. Requested Prescriptions     Signed Prescriptions Disp Refills    naproxen (EC-NAPROSYN) 500 MG EC tablet 28 tablet 0     Sig: Take 1 tablet by mouth 2 times daily (with meals) for 14 days    amphetamine-dextroamphetamine (ADDERALL XR) 15 MG extended release capsule 30 capsule 0     Sig: TAKE 1 CAPSULE BY MOUTH DAILY    lamoTRIgine (LAMICTAL) 25 MG tablet 60 tablet 5     Sig: take 1 tablet by mouth twice a day         Patient Instructions   SURVEY:    You may be receiving a survey from [employer] regarding your visit today. You may get this in the mail, through your MyChart or in your email. Please complete the survey to enable us to provide the highest quality of care to you and your family. If you cannot score us as very good ( 5 Stars) on any question, please feel free to call the office to discuss how we could have made your experience exceptional.     Thank you. Clinical Care Team:  Dr. Freda Tran, DO Angeles Akins, [address] [place] [address] Team:  [facility]    Patient Education        Carpal Tunnel Syndrome: Exercises  Introduction  Here are some examples of exercises for you to try. The exercises may be suggested for a condition or for rehabilitation. Start each exercise slowly. Ease off the exercises if you start to have pain.   You will be told when to start these exercises and which ones will work bestfor you. Warm-up stretches  When you no longer have pain or numbness, you can do exercises to help prevent carpal tunnel syndrome from coming back. Do not do any stretch or movement thatis uncomfortable or painful. 1. Rotate your wrist up, down, and from side to side. Repeat 4 times. 2. Stretch your fingers far apart. Relax them, and then stretch them again. Repeat 4 times. 3. Stretch your thumb by pulling it back gently, holding it, and then releasing it. Repeat 4 times. How to do the exercises  Prayer stretch    1. Start with your palms together in front of your chest just below your chin. 2. Slowly lower your hands toward your waistline, keeping your hands close to your stomach and your palms together until you feel a mild to moderate stretch under your forearms. 3. Hold for at least 15 to 30 seconds. Repeat 2 to 4 times. Wrist flexor stretch    1. Extend your arm in front of you with your palm up. 2. Bend your wrist, pointing your hand toward the floor. 3. With your other hand, gently bend your wrist farther until you feel a mild to moderate stretch in your forearm. 4. Hold for at least 15 to 30 seconds. Repeat 2 to 4 times. Wrist extensor stretch    1. Repeat steps 1 through 4 of the stretch above, but begin with your extended hand palm down. Follow-up care is a key part of your treatment and safety. Be sure to make and go to all appointments, and call your doctor if you are having problems. It's also a good idea to know your test results and keep alist of the medicines you take. Where can you learn more? Go to [email or web address] / [email or web address]. org and sign in to your [employer] account. Enter R657 in the [employer] box to learn more about \"Carpal Tunnel Syndrome: Exercises. \"     If you do not have an account, please click on the \"Sign Up Now\" link.   Current as of: July 1, 2021               Content Version: 13.2  © 2006-2022 Healthwise, Incorporated. Care instructions adapted under license by [facility] ([facility]). If you have questions about a medical condition or this instruction, always ask your healthcare professional. [address] any warranty or liability for your use of this information. [facility] received counseling on the following healthy behaviors: medication adherence  Reviewed prior labs and health maintenance. Continue current medications, diet and exercise. Discussed use, benefit, and side effects of prescribed medications. Barriers to medication compliance addressed. Patient given educational materials - see patient instructions. All patient questions answered.   Patient voiced understanding.     signed by Francisco Avendaño DO on 3/31/2022 at 8:54 AM  [address]  Dept: [phone]

## 2022-03-31 NOTE — PROGRESS NOTES
Swathi Hiren arrived on time for her Diagnostic Sleep Study. Setup and polysomnogram progressed successfully.

## 2022-04-05 LAB — STATUS: NORMAL

## 2022-04-06 ENCOUNTER — TELEPHONE (OUTPATIENT)
Dept: FAMILY MEDICINE CLINIC | Age: 52
End: 2022-04-06

## 2022-04-06 NOTE — TELEPHONE ENCOUNTER
----- Message from Carson Wu sent at 4/5/2022  4:58 PM EDT -----  Subject: Message to Provider    QUESTIONS  Information for Provider? pt returning call from office   ---------------------------------------------------------------------------  --------------  [address]  What is the best way for the office to contact you? OK to leave message on   voicemail  Preferred Call Back Phone Number? [phone]  ---------------------------------------------------------------------------  --------------  SCRIPT ANSWERS  Relationship to Patient?  Self

## 2022-04-29 ENCOUNTER — TELEPHONE (OUTPATIENT)
Dept: FAMILY MEDICINE CLINIC | Age: 52
End: 2022-04-29

## 2022-04-29 NOTE — TELEPHONE ENCOUNTER
----- Message from Naldo Anderson sent at 4/29/2022  1:16 PM EDT -----  Subject: Message to Provider    QUESTIONS  Information for Provider? Pt has an appt on 5/5/22 @ 8:40 am. Pt is asking   if she could be put on a cancellation list, should an sooner appt come   avail. Please call pt  ---------------------------------------------------------------------------  --------------  CALL BACK INFO  What is the best way for the office to contact you? OK to leave message on   voicemail  Preferred Call Back Phone Number? [phone]  ---------------------------------------------------------------------------  --------------  SCRIPT ANSWERS  Relationship to Patient?  Self

## 2022-05-16 ENCOUNTER — TELEPHONE (OUTPATIENT)
Dept: FAMILY MEDICINE CLINIC | Age: 52
End: 2022-05-16

## 2022-05-16 NOTE — TELEPHONE ENCOUNTER
----- Message from Shannon Barbosa sent at 5/16/2022  7:22 AM EDT -----  Subject: Appointment Request    Reason for Call: Urgent Back Neck Pain    QUESTIONS  Type of Appointment? Established Patient  Reason for appointment request? Available appointments did not meet   patient need  Additional Information for Provider? Tested positive on Saturday 5/14 for   Covid 19. She would like to be seen when the doctor feels it is the time   after recovering from Covid  ---------------------------------------------------------------------------  --------------  [address]  What is the best way for the office to contact you? OK to leave message on   voicemail  Preferred Call Back Phone Number? [phone]  ---------------------------------------------------------------------------  --------------  SCRIPT ANSWERS  Relationship to Patient? Self  Have your symptoms changed? Yes  Did you have an injury or trauma within the past 3 days? No  Are you having new problems with your bowel or bladder control? No  Are you having new onset numbness, tingling, or weakness in your arms   and/or legs with this pain? No  Did your pain begin within the past 14 days? Yes  Have you been diagnosed with, awaiting test results for, or told that you   are suspected of having COVID-19 (Coronavirus)? (If patient has tested   negative or was tested as a requirement for work, school, or travel and   not based on symptoms, answer no)? Yes  Did your symptoms begin within the past 10 days or was your positive test   result within the past 10 days?  Yes

## 2022-05-18 ENCOUNTER — HOSPITAL ENCOUNTER (EMERGENCY)
Age: 52
Discharge: HOME | End: 2022-05-18
Attending: EMERGENCY MEDICINE
Payer: COMMERCIAL

## 2022-05-18 VITALS
TEMPERATURE: 98.6 F | OXYGEN SATURATION: 100 % | HEIGHT: 68 IN | RESPIRATION RATE: 18 BRPM | SYSTOLIC BLOOD PRESSURE: 141 MMHG | HEART RATE: 75 BPM | BODY MASS INDEX: 27.89 KG/M2 | DIASTOLIC BLOOD PRESSURE: 82 MMHG | WEIGHT: 184 LBS

## 2022-05-18 DIAGNOSIS — U07.1 COVID: Primary | ICD-10-CM

## 2022-05-18 PROCEDURE — 99283 EMERGENCY DEPT VISIT LOW MDM: CPT

## 2022-05-18 RX ORDER — PROMETHAZINE HYDROCHLORIDE AND CODEINE PHOSPHATE 6.25; 1 MG/5ML; MG/5ML
5 SYRUP ORAL 4 TIMES DAILY PRN
Qty: 118 ML | Refills: 0 | Status: SHIPPED | OUTPATIENT
Start: 2022-05-18 | End: 2022-05-25

## 2022-05-18 ASSESSMENT — PAIN - FUNCTIONAL ASSESSMENT: PAIN_FUNCTIONAL_ASSESSMENT: NONE - DENIES PAIN

## 2022-05-18 NOTE — Clinical Note
Maribell Cortes was seen and treated in our emergency department on 5/18/2022. She may return to work on 05/20/2022. If no respiratory symptoms or fever. Should wear a mask if going to be in close contact with others     If you have any questions or concerns, please don't hesitate to call.       Serenity Thomas MD

## 2022-05-31 ENCOUNTER — TELEPHONE (OUTPATIENT)
Dept: FAMILY MEDICINE CLINIC | Age: 52
End: 2022-05-31

## 2022-05-31 DIAGNOSIS — G93.32 CHRONIC FATIGUE SYNDROME: Primary | ICD-10-CM

## 2022-05-31 RX ORDER — DEXTROAMPHETAMINE SACCHARATE, AMPHETAMINE ASPARTATE MONOHYDRATE, DEXTROAMPHETAMINE SULFATE AND AMPHETAMINE SULFATE 3.75; 3.75; 3.75; 3.75 MG/1; MG/1; MG/1; MG/1
CAPSULE, EXTENDED RELEASE ORAL
Qty: 30 CAPSULE | Refills: 0 | Status: SHIPPED | OUTPATIENT
Start: 2022-05-31 | End: 2022-08-10 | Stop reason: SDUPTHER

## 2022-05-31 NOTE — TELEPHONE ENCOUNTER
rx sent. Please reschedule for a time that would work better for her, even if it's out a month or something.

## 2022-05-31 NOTE — TELEPHONE ENCOUNTER
Patient had to cancel today's visit because she is working a double - is out of Adderall - asking for script to be sent to [facility] Maintenance   Topic Date Due    Cervical cancer screen  Never done    Colorectal Cancer Screen  Never done    COVID-19 Vaccine (2 - Pfizer 3-dose series) 10/13/2021    Flu vaccine (Season Ended) 09/01/2022    Depression Monitoring  02/08/2023    Lipids  03/08/2026    DTaP/Tdap/Td vaccine (2 - Td or Tdap) 11/29/2028    Hepatitis C screen  Completed    HIV screen  Completed    Hepatitis A vaccine  Aged Out    Hepatitis B vaccine  Aged Out    Hib vaccine  Aged Out    Meningococcal (ACWY) vaccine  Aged Out    Pneumococcal 0-64 years Vaccine  Aged Out             (applicable per patient's age: Cancer Screenings, Depression Screening, Fall Risk Screening, Immunizations)    LDL Cholesterol (mg/dL)   Date Value   03/08/2021 130     AST (U/L)   Date Value   03/08/2021 19     ALT (U/L)   Date Value   03/08/2021 <5 (L)     BUN (mg/dL)   Date Value   03/08/2021 7      (goal A1C is < 7)   (goal LDL is <100) need 30-50% reduction from baseline     BP Readings from Last 3 Encounters:   05/18/22 (!) 141/82   03/31/22 128/72   02/08/22 130/80    (goal /80)      All Future Testing planned in [facility]:  Lab Frequency Next Occurrence   NELA DIGITAL SCREEN W OR WO CAD BILATERAL Once 03/17/2022       Next Visit Date:  Future Appointments   Date Time Provider [facility]   5/31/2022  3[facility]20 [facility]   6/20/2022 10:00 AM [address] [facility]            Patient Active Problem List:     Anxiety     Insomnia     HTN (hypertension)     Venous insufficiency     Overweight

## 2022-05-31 NOTE — TELEPHONE ENCOUNTER
Last visit:  2/8/22  Same day cancel and no show last 4 appts  Next Visit Date:    Future Appointments   Date Time Provider Reginaldo Echavarria   5/31/2022  3:20 PM Sarabjit Agustin DO [facility]   6/20/2022 10:00 AM [address] [facility] Rad         Medication List:  Prior to Admission medications    Medication Sig Start Date End Date Taking? Authorizing Provider   naproxen (EC-NAPROSYN) 500 MG EC tablet Take 1 tablet by mouth 2 times daily (with meals) for 14 days 3/31/22 4/14/22  Sarabjit Agustin DO   amphetamine-dextroamphetamine (ADDERALL XR) 15 MG extended release capsule TAKE 1 CAPSULE BY MOUTH DAILY 4/6/22 5/6/22  Sarabjit Agustin DO   lamoTRIgine (LAMICTAL) 25 MG tablet take 1 tablet by mouth twice a day 3/31/22   Sarabjit Agustin DO   citalopram (CELEXA) 40 MG tablet take 1 tablet by mouth once daily 2/9/22   Sarabjit Agustin DO   gabapentin (NEURONTIN) 300 MG capsule 300 mg every morning, 300 mg every afternoon, 600 mg every evening at bedtime 2/8/22 7/26/22  Sarabjit Agustin, DO   magnesium oxide (MAG-OX) 400 MG tablet Take 1 tablet by mouth at bedtime 2/8/22   Sarabjit Agustin, DO   Vitamin D-Vitamin K [facility]) 5500-200 UNIT-MCG TABS take 1 tablet by mouth daily 1/14/22   Sarabjit Agustin DO   pantoprazole (PROTONIX) 40 MG tablet Take 1 tablet by mouth every morning (before breakfast) 1/14/22   Sarabjit Agustin DO   buPROPion (WELLBUTRIN XL) 150 MG extended release tablet Take 1 tablet by mouth every morning 12/9/21   Sarabjit Agustin DO   clobetasol (TEMOVATE) 0.05 % cream apply to affected area twice a day for 30 DAYS then if needed 3/18/21   Historical Provider, MD   estradiol (ESTRACE) 0.1 MG/GM vaginal cream apply ONE PEA SIZE DOLLOP ON EXTERNAL VAGINAL AREA AND SWIPE INTO. ..  (REFER TO PRESCRIPTION NOTES).  3/18/21   Historical Provider, MD   diclofenac sodium (VOLTAREN) 1 % GEL Apply topically 2 times daily    Historical Provider, MD   Multiple Vitamins-Minerals (HAIR SKIN AND NAILS FORMULA PO) Take by mouth    Historical Provider, MD   TURMERIC PO Take by mouth    Historical Provider, MD   FOLIC ACID PO Take 0,856 mg by mouth daily     Historical Provider, MD   saline nasal gel (AYR) GEL by Nasal route as needed for Congestion 5/31/19   Tamie Daley DO   acetaminophen (TYLENOL) 325 MG tablet Take 650 mg by mouth every 6 hours as needed.       Historical Provider, MD

## 2022-06-20 ENCOUNTER — HOSPITAL ENCOUNTER (OUTPATIENT)
Dept: MAMMOGRAPHY | Age: 52
Discharge: HOME | End: 2022-06-22
Payer: COMMERCIAL

## 2022-06-20 DIAGNOSIS — Z12.31 SCREENING MAMMOGRAM FOR HIGH-RISK PATIENT: ICD-10-CM

## 2022-06-20 PROCEDURE — 77063 BREAST TOMOSYNTHESIS BI: CPT

## 2022-07-07 RX ORDER — PANTOPRAZOLE SODIUM 40 MG/1
TABLET, DELAYED RELEASE ORAL
Qty: 30 TABLET | Refills: 5 | Status: SHIPPED | OUTPATIENT
Start: 2022-07-07

## 2022-07-07 NOTE — TELEPHONE ENCOUNTER
Last visit:  3/31/2022  Next Visit Date:  No future appointments. Medication List:  Prior to Admission medications    Medication Sig Start Date End Date Taking? Authorizing Provider   amphetamine-dextroamphetamine (ADDERALL XR) 15 MG extended release capsule TAKE 1 CAPSULE BY MOUTH DAILY 5/31/22 6/30/22  Danielle Almaraz,    naproxen (EC-NAPROSYN) 500 MG EC tablet Take 1 tablet by mouth 2 times daily (with meals) for 14 days 3/31/22 4/14/22  Danielle Almaraz,    lamoTRIgine (LAMICTAL) 25 MG tablet take 1 tablet by mouth twice a day 3/31/22   Danielle Almaraz, DO   citalopram (CELEXA) 40 MG tablet take 1 tablet by mouth once daily 2/9/22   Danielle Almaraz, DO   gabapentin (NEURONTIN) 300 MG capsule 300 mg every morning, 300 mg every afternoon, 600 mg every evening at bedtime 2/8/22 7/26/22  Danielle Almaraz, DO   magnesium oxide (MAG-OX) 400 MG tablet Take 1 tablet by mouth at bedtime 2/8/22   Danielle Almaraz, DO   Vitamin D-Vitamin K [facility]) 5500-200 UNIT-MCG TABS take 1 tablet by mouth daily 1/14/22   Danielle Almaraz,    pantoprazole (PROTONIX) 40 MG tablet Take 1 tablet by mouth every morning (before breakfast) 1/14/22   Danielle Almaraz, DO   buPROPion (WELLBUTRIN XL) 150 MG extended release tablet Take 1 tablet by mouth every morning 12/9/21   Danielle Almaraz,    clobetasol (TEMOVATE) 0.05 % cream apply to affected area twice a day for 30 DAYS then if needed 3/18/21   Historical Provider, MD   estradiol (ESTRACE) 0.1 MG/GM vaginal cream apply ONE PEA SIZE DOLLOP ON EXTERNAL VAGINAL AREA AND SWIPE INTO. ..  (REFER TO PRESCRIPTION NOTES).  3/18/21   Historical Provider, MD   diclofenac sodium (VOLTAREN) 1 % GEL Apply topically 2 times daily    Historical Provider, MD   Multiple Vitamins-Minerals (HAIR SKIN AND NAILS FORMULA PO) Take by mouth    Historical Provider, MD   TURMERIC PO Take by mouth    Historical Provider, MD   FOLIC ACID PO Take 3,828 mg by mouth daily     Historical Provider, MD   saline nasal gel (AYR) GEL by Nasal route as needed for Congestion 5/31/19   [place], DO   acetaminophen (TYLENOL) 325 MG tablet Take 650 mg by mouth every 6 hours as needed.       Historical Provider, MD

## 2022-08-03 ENCOUNTER — HOSPITAL ENCOUNTER (OUTPATIENT)
Age: 52
Setting detail: SPECIMEN
Discharge: HOME | End: 2022-08-03
Payer: COMMERCIAL

## 2022-08-03 ENCOUNTER — OFFICE VISIT (OUTPATIENT)
Dept: PRIMARY CARE CLINIC | Age: 52
End: 2022-08-03
Payer: COMMERCIAL

## 2022-08-03 VITALS
SYSTOLIC BLOOD PRESSURE: 131 MMHG | BODY MASS INDEX: 27.89 KG/M2 | WEIGHT: 184 LBS | RESPIRATION RATE: 18 BRPM | DIASTOLIC BLOOD PRESSURE: 85 MMHG | HEART RATE: 65 BPM | HEIGHT: 68 IN | TEMPERATURE: 98.2 F | OXYGEN SATURATION: 100 %

## 2022-08-03 DIAGNOSIS — N30.00 ACUTE CYSTITIS WITHOUT HEMATURIA: ICD-10-CM

## 2022-08-03 DIAGNOSIS — N30.00 ACUTE CYSTITIS WITHOUT HEMATURIA: Primary | ICD-10-CM

## 2022-08-03 DIAGNOSIS — R39.9 UTI SYMPTOMS: ICD-10-CM

## 2022-08-03 LAB
BILIRUBIN, POC: NEGATIVE
BLOOD URINE, POC: NEGATIVE
CLARITY, POC: CLEAR
COLOR, POC: YELLOW
GLUCOSE URINE, POC: NEGATIVE
KETONES, POC: NEGATIVE
LEUKOCYTE EST, POC: ABNORMAL
NITRITE, POC: POSITIVE
PH, POC: 6
PROTEIN, POC: NEGATIVE
SPECIFIC GRAVITY, POC: 1.01
UROBILINOGEN, POC: 0.2

## 2022-08-03 PROCEDURE — 1036F TOBACCO NON-USER: CPT | Performed by: NURSE PRACTITIONER

## 2022-08-03 PROCEDURE — G8427 DOCREV CUR MEDS BY ELIG CLIN: HCPCS | Performed by: NURSE PRACTITIONER

## 2022-08-03 PROCEDURE — 87088 URINE BACTERIA CULTURE: CPT

## 2022-08-03 PROCEDURE — G8419 CALC BMI OUT NRM PARAM NOF/U: HCPCS | Performed by: NURSE PRACTITIONER

## 2022-08-03 PROCEDURE — 81003 URINALYSIS AUTO W/O SCOPE: CPT | Performed by: NURSE PRACTITIONER

## 2022-08-03 PROCEDURE — 87077 CULTURE AEROBIC IDENTIFY: CPT

## 2022-08-03 PROCEDURE — 87186 SC STD MICRODIL/AGAR DIL: CPT

## 2022-08-03 PROCEDURE — 99213 OFFICE O/P EST LOW 20 MIN: CPT | Performed by: NURSE PRACTITIONER

## 2022-08-03 PROCEDURE — 87086 URINE CULTURE/COLONY COUNT: CPT

## 2022-08-03 RX ORDER — NITROFURANTOIN 25; 75 MG/1; MG/1
100 CAPSULE ORAL 2 TIMES DAILY
Qty: 10 CAPSULE | Refills: 0 | Status: SHIPPED | OUTPATIENT
Start: 2022-08-03 | End: 2022-08-08

## 2022-08-03 NOTE — PATIENT INSTRUCTIONS
SURVEY:    You may be receiving a survey from [employer] regarding your visit today. Please complete the survey to enable us to provide the highest quality of care to you and your family. If you cannot score us a very good on any question, please call the office to discuss how we could of made your experience a very good one. Thank you for letting us take care of you today. We hope all your questions were addressed. If a question was overlooked or something else comes to mind after you return home, please contact a member of your Care Team listed below.     Thank you,  Rene Brasher MA      Your Care Team at [address] [facility]  Provider- JOSELYN Sarabia  Provider- JOSELYN Adams  [address] [address]  [place]- [place], [place]      Walk-in contact numbers:       Phone: [phone]                 Fax: [phone]    [facility] Walk-in Hours:  Mon-Thurs: 9:00 am - 5:30 pm     Friday: 8:00 am - 12:00 pm           Sat-Sun: CLOSED

## 2022-08-03 NOTE — PROGRESS NOTES
[facility] WALK-IN CARE  [address] [address]  Dept: [phone]  Dept Fax: [phone]    Sae Farmer is a 54 y.o. female who presents to the [address] [place] [address] in Care today for her medical conditions/complaints as noted below. Sae Farmer is c/o ofUrinary Tract Infection (Started a couple days ago- burning, irritation, frequency and lower back pain.)      HPI:   HPI    42-year-old female presents with concern for urinary tract infection after last 2 days of dysuria, urinary frequency and intermittent lower back pain. She denies fever, abdominal pain, nausea and vomiting, vaginal discharge or pelvic pain. Past Medical History:   Diagnosis Date    Anxiety     Arthritis     Asthma     as child    Depression     Osteoarthritis     Ulcer     stomach    Wears glasses         Current Outpatient Medications   Medication Sig Dispense Refill    nitrofurantoin, macrocrystal-monohydrate, (MACROBID) 100 MG capsule Take 1 capsule by mouth in the morning and 1 capsule before bedtime. Do all this for 5 days.  10 capsule 0    pantoprazole (PROTONIX) 40 MG tablet take 1 tablet by mouth every morning before breakfast 30 tablet 5    lamoTRIgine (LAMICTAL) 25 MG tablet take 1 tablet by mouth twice a day 60 tablet 5    citalopram (CELEXA) 40 MG tablet take 1 tablet by mouth once daily 90 tablet 1    magnesium oxide (MAG-OX) 400 MG tablet Take 1 tablet by mouth at bedtime 30 tablet 5    Vitamin D-Vitamin K (DOSOQUIN) 5500-200 UNIT-MCG TABS take 1 tablet by mouth daily 90 tablet 1    buPROPion (WELLBUTRIN XL) 150 MG extended release tablet Take 1 tablet by mouth every morning 90 tablet 1    clobetasol (TEMOVATE) 0.05 % cream apply to affected area twice a day for 30 DAYS then if needed      Multiple Vitamins-Minerals (HAIR SKIN AND NAILS FORMULA PO) Take by mouth      TURMERIC PO Take by mouth      FOLIC ACID PO Take 2,530 mg by mouth daily       acetaminophen (TYLENOL) 325 MG tablet Take 650 mg by mouth every 6 hours as needed. amphetamine-dextroamphetamine (ADDERALL XR) 15 MG extended release capsule TAKE 1 CAPSULE BY MOUTH DAILY 30 capsule 0    naproxen (EC-NAPROSYN) 500 MG EC tablet Take 1 tablet by mouth 2 times daily (with meals) for 14 days 28 tablet 0    gabapentin (NEURONTIN) 300 MG capsule 300 mg every morning, 300 mg every afternoon, 600 mg every evening at bedtime 120 capsule 2    estradiol (ESTRACE) 0.1 MG/GM vaginal cream apply ONE PEA SIZE DOLLOP ON EXTERNAL VAGINAL AREA AND SWIPE INTO. ..  (REFER TO PRESCRIPTION NOTES). (Patient not taking: Reported on 8/3/2022)      diclofenac sodium (VOLTAREN) 1 % GEL Apply topically 2 times daily (Patient not taking: Reported on 8/3/2022)      saline nasal gel (AYR) GEL by Nasal route as needed for Congestion (Patient not taking: Reported on 8/3/2022) 1 Tube 3     No current facility-administered medications for this visit. Allergies   Allergen Reactions    Vicodin [Hydrocodone-Acetaminophen] Nausea And Vomiting       Subjective:      Review of Systems   Constitutional:  Negative for fever. Genitourinary:  Positive for dysuria, flank pain, frequency and urgency. Objective:     Physical Exam  Vitals and nursing note reviewed. Constitutional:       Comments:  Appearing to be of stated age with warm and dry skin, no apparent distress; she is well-appearing and appears well-hydrated. She is non-toxic in no apparent distress. Cardiovascular:      Rate and Rhythm: Normal rate and regular rhythm. Pulmonary:      Effort: Pulmonary effort is normal.      Breath sounds: Normal breath sounds. Abdominal:      General: Bowel sounds are normal.      Palpations: Abdomen is soft. Tenderness: There is no abdominal tenderness.       Comments:        /85 (Site: Right Upper Arm, Position: Sitting, Cuff Size: Medium Adult)   Pulse 65   Temp 98.2 °F (36.8 °C) (Oral)   Resp 18   Ht 5' 8\" (1.727 m)   Wt 184 lb (83.5 kg)   LMP 10/03/2017 (Exact Date)   SpO2 100%   BMI 27.98 kg/m²   Results for orders placed or performed in visit on 08/03/22   POCT Urinalysis No Micro (Auto)   Result Value Ref Range    Color, UA Yellow     Clarity, UA Clear     Glucose, UA POC Negative     Bilirubin, UA Negative     Ketones, UA Negative     Spec Grav, UA 1.010     Blood, UA POC Negative     pH, UA 6.0     Protein, UA POC Negative     Urobilinogen, UA 0.2     Leukocytes, UA Trace (A)     Nitrite, UA Positive (A)      Assessment:      Diagnosis Orders   1. Acute cystitis without hematuria  nitrofurantoin, macrocrystal-monohydrate, (MACROBID) 100 MG capsule    Culture, Urine      2. UTI symptoms  POCT Urinalysis No Micro (Auto)          Plan:     - Treating empirically today with Macrobid;       administration side effects reviewed. Advised to take with food and a probiotic and examples were provided. - Discussed supportive management/lifestyle changes including good oral hydration (2 liters a day), decrease caffeine intake or carbonated drinks, nutritious food choices as well as adding a daily probiotic to diet via a live cultured yogurt or OTC Culturelle. For repeated UTI's can try sugar free cranberry juice or tablets at night. Advised to avoid full bladder, bubble batch, bath oils and hot tubs. Void after sexual intercourse. If postmenopausal, consider topical estrogens. - This office will call with urine culture results. - Discussed strict follow-up precautions for any worsening and/or concerns. Return for ED for worsening symptoms. Orders Placed This Encounter   Medications    nitrofurantoin, macrocrystal-monohydrate, (MACROBID) 100 MG capsule     Sig: Take 1 capsule by mouth in the morning and 1 capsule before bedtime. Do all this for 5 days.      Dispense:  10 capsule     Refill:  0          Electronically signed by DEANDRA Pritchett CNP on 8/3/2022 at 3:13 PM

## 2022-08-06 LAB
CULTURE: ABNORMAL
CULTURE: ABNORMAL
SPECIMEN DESCRIPTION: ABNORMAL

## 2022-08-08 DIAGNOSIS — N30.00 ACUTE CYSTITIS WITHOUT HEMATURIA: Primary | ICD-10-CM

## 2022-08-08 RX ORDER — CEPHALEXIN 500 MG/1
500 CAPSULE ORAL 4 TIMES DAILY
Qty: 28 CAPSULE | Refills: 0 | Status: SHIPPED | OUTPATIENT
Start: 2022-08-08 | End: 2022-08-15

## 2022-08-10 DIAGNOSIS — G93.32 CHRONIC FATIGUE SYNDROME: ICD-10-CM

## 2022-08-10 RX ORDER — CHOLECALCIFEROL (VITD3)/VIT K2 137.5-2
TABLET ORAL
Qty: 90 TABLET | Refills: 1 | Status: SHIPPED | OUTPATIENT
Start: 2022-08-10 | End: 2022-10-11

## 2022-08-10 RX ORDER — DEXTROAMPHETAMINE SACCHARATE, AMPHETAMINE ASPARTATE MONOHYDRATE, DEXTROAMPHETAMINE SULFATE AND AMPHETAMINE SULFATE 3.75; 3.75; 3.75; 3.75 MG/1; MG/1; MG/1; MG/1
CAPSULE, EXTENDED RELEASE ORAL
Qty: 30 CAPSULE | Refills: 0 | Status: SHIPPED | OUTPATIENT
Start: 2022-08-10 | End: 2022-10-07 | Stop reason: SDUPTHER

## 2022-08-10 RX ORDER — CITALOPRAM 40 MG/1
TABLET ORAL
Qty: 90 TABLET | Refills: 1 | Status: SHIPPED | OUTPATIENT
Start: 2022-08-10

## 2022-08-10 RX ORDER — GABAPENTIN 300 MG/1
CAPSULE ORAL
Qty: 120 CAPSULE | Refills: 2 | Status: CANCELLED | OUTPATIENT
Start: 2022-08-10 | End: 2023-01-25

## 2022-08-10 RX ORDER — GABAPENTIN 300 MG/1
CAPSULE ORAL
Qty: 120 CAPSULE | Refills: 2 | Status: SHIPPED | OUTPATIENT
Start: 2022-08-10 | End: 2022-10-31

## 2022-08-10 RX ORDER — NAPROXEN 500 MG/1
TABLET ORAL
Qty: 60 TABLET | Refills: 2 | Status: SHIPPED | OUTPATIENT
Start: 2022-08-10

## 2022-08-10 RX ORDER — NAPROXEN 500 MG/1
500 TABLET ORAL 2 TIMES DAILY WITH MEALS
Qty: 28 TABLET | Refills: 0 | Status: CANCELLED | OUTPATIENT
Start: 2022-08-10 | End: 2022-08-24

## 2022-08-30 ENCOUNTER — OFFICE VISIT (OUTPATIENT)
Dept: FAMILY MEDICINE CLINIC | Age: 52
End: 2022-08-30
Payer: COMMERCIAL

## 2022-08-30 VITALS
HEIGHT: 68 IN | SYSTOLIC BLOOD PRESSURE: 124 MMHG | DIASTOLIC BLOOD PRESSURE: 78 MMHG | WEIGHT: 186 LBS | BODY MASS INDEX: 28.19 KG/M2 | HEART RATE: 88 BPM | OXYGEN SATURATION: 99 %

## 2022-08-30 DIAGNOSIS — G89.29 CHRONIC RIGHT HIP PAIN: Primary | ICD-10-CM

## 2022-08-30 DIAGNOSIS — F41.9 ANXIETY: ICD-10-CM

## 2022-08-30 DIAGNOSIS — G93.32 CHRONIC FATIGUE SYNDROME: ICD-10-CM

## 2022-08-30 DIAGNOSIS — R07.89 ATYPICAL CHEST PAIN: ICD-10-CM

## 2022-08-30 DIAGNOSIS — M25.551 CHRONIC RIGHT HIP PAIN: Primary | ICD-10-CM

## 2022-08-30 DIAGNOSIS — Z12.11 SCREENING FOR COLORECTAL CANCER: ICD-10-CM

## 2022-08-30 DIAGNOSIS — Z12.12 SCREENING FOR COLORECTAL CANCER: ICD-10-CM

## 2022-08-30 PROCEDURE — 99214 OFFICE O/P EST MOD 30 MIN: CPT | Performed by: FAMILY MEDICINE

## 2022-08-30 PROCEDURE — 3017F COLORECTAL CA SCREEN DOC REV: CPT | Performed by: FAMILY MEDICINE

## 2022-08-30 PROCEDURE — 1036F TOBACCO NON-USER: CPT | Performed by: FAMILY MEDICINE

## 2022-08-30 PROCEDURE — G8427 DOCREV CUR MEDS BY ELIG CLIN: HCPCS | Performed by: FAMILY MEDICINE

## 2022-08-30 PROCEDURE — G8419 CALC BMI OUT NRM PARAM NOF/U: HCPCS | Performed by: FAMILY MEDICINE

## 2022-08-30 RX ORDER — ALPRAZOLAM 0.5 MG/1
0.5 TABLET ORAL 3 TIMES DAILY PRN
Qty: 60 TABLET | Refills: 0 | Status: SHIPPED | OUTPATIENT
Start: 2022-08-30 | End: 2022-09-29

## 2022-08-30 RX ORDER — BUPROPION HYDROCHLORIDE 150 MG/1
150 TABLET ORAL EVERY MORNING
Qty: 90 TABLET | Refills: 1 | Status: SHIPPED | OUTPATIENT
Start: 2022-08-30

## 2022-08-30 RX ORDER — LAMOTRIGINE 25 MG/1
TABLET ORAL
Qty: 60 TABLET | Refills: 5 | Status: SHIPPED | OUTPATIENT
Start: 2022-08-30

## 2022-08-30 RX ORDER — MAGNESIUM OXIDE 400 MG/1
400 TABLET ORAL NIGHTLY
Qty: 30 TABLET | Refills: 5 | Status: SHIPPED | OUTPATIENT
Start: 2022-08-30

## 2022-08-30 SDOH — ECONOMIC STABILITY: FOOD INSECURITY: WITHIN THE PAST 12 MONTHS, THE FOOD YOU BOUGHT JUST DIDN'T LAST AND YOU DIDN'T HAVE MONEY TO GET MORE.: NEVER TRUE

## 2022-08-30 SDOH — ECONOMIC STABILITY: FOOD INSECURITY: WITHIN THE PAST 12 MONTHS, YOU WORRIED THAT YOUR FOOD WOULD RUN OUT BEFORE YOU GOT MONEY TO BUY MORE.: NEVER TRUE

## 2022-08-30 ASSESSMENT — PATIENT HEALTH QUESTIONNAIRE - PHQ9
SUM OF ALL RESPONSES TO PHQ QUESTIONS 1-9: 2
3. TROUBLE FALLING OR STAYING ASLEEP: 1
8. MOVING OR SPEAKING SO SLOWLY THAT OTHER PEOPLE COULD HAVE NOTICED. OR THE OPPOSITE, BEING SO FIGETY OR RESTLESS THAT YOU HAVE BEEN MOVING AROUND A LOT MORE THAN USUAL: 0
2. FEELING DOWN, DEPRESSED OR HOPELESS: 0
5. POOR APPETITE OR OVEREATING: 0
SUM OF ALL RESPONSES TO PHQ QUESTIONS 1-9: 2
4. FEELING TIRED OR HAVING LITTLE ENERGY: 1
7. TROUBLE CONCENTRATING ON THINGS, SUCH AS READING THE NEWSPAPER OR WATCHING TELEVISION: 0
SUM OF ALL RESPONSES TO PHQ QUESTIONS 1-9: 2
9. THOUGHTS THAT YOU WOULD BE BETTER OFF DEAD, OR OF HURTING YOURSELF: 0
10. IF YOU CHECKED OFF ANY PROBLEMS, HOW DIFFICULT HAVE THESE PROBLEMS MADE IT FOR YOU TO DO YOUR WORK, TAKE CARE OF THINGS AT HOME, OR GET ALONG WITH OTHER PEOPLE: 0
SUM OF ALL RESPONSES TO PHQ QUESTIONS 1-9: 2
6. FEELING BAD ABOUT YOURSELF - OR THAT YOU ARE A FAILURE OR HAVE LET YOURSELF OR YOUR FAMILY DOWN: 0

## 2022-08-30 ASSESSMENT — SOCIAL DETERMINANTS OF HEALTH (SDOH): HOW HARD IS IT FOR YOU TO PAY FOR THE VERY BASICS LIKE FOOD, HOUSING, MEDICAL CARE, AND HEATING?: NOT HARD AT ALL

## 2022-08-30 NOTE — TELEPHONE ENCOUNTER
Pt  stated her insurance will no longer cover brenna Lynn is there another medication to cover it?        Last OV: 8/30/2022 chronic  Last RX:    Next scheduled apt: 10/11/2022 6 wks       Pt requesting a refill

## 2022-08-30 NOTE — PROGRESS NOTES
Name: Angeles Gillis  : 1970         Chief Complaint:     Chief Complaint   Patient presents with    Hip Pain     Right hip pain, radiates down her right leg. Hypertension    Anxiety    Chest Pain     Last week had an episode of left sided chest pain, radiated into left side of neck. Last for 20 minutes. History of Present Illness:      Angeles Gillis is a 50 y.o.  female who presents with Hip Pain (Right hip pain, radiates down her right leg. ), Hypertension, Anxiety, and Chest Pain (Last week had an episode of left sided chest pain, radiated into left side of neck. Last for 20 minutes. )      HPI    About 10 days ago at work had episode of pain going from L neck down into chest, felt like a tightness. Took baby aspirin that someone gave her and then felt like she needed to burp but couldn't. Then felt like she could be going into a panic attack so she took a xanax and within 20 min started feeling better. Went home and then felt shaky, weak, laid down. Has been ok since then, no CP or SOB with exertion - walks a lot of stairs at work. C/o pain in R hip, thigh, and knee. Going on for a couple yrs, had gotten better but then recently got a lot worse. Some days worse than others, some of which seems to go along with weather changes. Wears a belt around hips and that seems to help. Tries to do stretches but is limited by range of R hip rotation - difficulty getting into figure 4 and also putting on sock and shoes. Had back injections which helped for a few days. I had given her at least one GTB injection which didn't help and oral steroids haven't seemed to help. Reports strong family h/o PAD. Fatigue greatly improved on adderall, no adverse effects.      Medical History:     Patient Active Problem List   Diagnosis    Anxiety    Insomnia    HTN (hypertension)    Venous insufficiency    Overweight       Medications:       Prior to Admission medications    Medication Sig Start Date End Date Taking? Authorizing Provider   magnesium oxide (MAG-OX) 400 MG tablet Take 1 tablet by mouth at bedtime 8/30/22  Yes Shirlyn Primrose, DO   buPROPion (WELLBUTRIN XL) 150 MG extended release tablet Take 1 tablet by mouth every morning 8/30/22  Yes Shirlyn Primrose, DO   lamoTRIgine (LAMICTAL) 25 MG tablet take 1 tablet by mouth twice a day 8/30/22  Yes Shirlyn Primrose, DO   ALPRAZolam Kimberly Sos) 0.5 MG tablet Take 1 tablet by mouth 3 times daily as needed for Anxiety for up to 30 days. 8/30/22 9/29/22  Shirlyn Primrose, DO   Vitamin D-Vitamin K [facility]) 5500-200 UNIT-MCG TABS take 1 tablet by mouth daily 8/10/22   Shirlyn Primrose, DO   amphetamine-dextroamphetamine (ADDERALL XR) 15 MG extended release capsule TAKE 1 CAPSULE BY MOUTH DAILY 8/10/22 9/9/22  Shirlyn Primrose, DO   citalopram (CELEXA) 40 MG tablet take 1 tablet by mouth once daily 8/10/22   Shirlyn Primrose, DO   naproxen (EC-NAPROSYN) 500 MG EC tablet Take 1 tablet by mouth 2 times daily (with meals) as needed for pain 8/10/22   Shirlyn Primrose, DO   gabapentin (NEURONTIN) 300 MG capsule 300 mg every morning, 300 mg every afternoon, 600 mg every evening at bedtime 8/10/22 1/25/23  Shirlyn Primrose, DO   pantoprazole (PROTONIX) 40 MG tablet take 1 tablet by mouth every morning before breakfast 7/7/22   Shirlyn Primrose, DO   clobetasol (TEMOVATE) 0.05 % cream apply to affected area twice a day for 30 DAYS then if needed 3/18/21   Historical Provider, MD   estradiol (ESTRACE) 0.1 MG/GM vaginal cream apply ONE PEA SIZE DOLLOP ON EXTERNAL VAGINAL AREA AND SWIPE INTO. ..  (REFER TO PRESCRIPTION NOTES).   Patient not taking: Reported on 8/3/2022 3/18/21   Historical Provider, MD   diclofenac sodium (VOLTAREN) 1 % GEL Apply topically 2 times daily  Patient not taking: Reported on 8/3/2022    Historical Provider, MD   Multiple Vitamins-Minerals (HAIR SKIN AND NAILS FORMULA PO) Take by mouth    Historical Provider, MD   TURMERIC PO Take by mouth Historical Provider, MD   FOLIC ACID PO Take 2,414 mg by mouth daily     Historical Provider, MD   saline nasal gel (AYR) GEL by Nasal route as needed for Congestion  Patient not taking: Reported on 8/3/2022 5/31/19   Ana Maria Martin DO   acetaminophen (TYLENOL) 325 MG tablet Take 650 mg by mouth every 6 hours as needed. Historical Provider, MD        Allergies:       Vicodin [hydrocodone-acetaminophen]    Physical Exam:     Vitals:  /78   Pulse 88   Ht 5' 8\" (1.727 m)   Wt 186 lb (84.4 kg)   LMP 10/03/2017 (Exact Date)   SpO2 99%   BMI 28.28 kg/m²   Physical Exam  Vitals and nursing note reviewed. Constitutional:       General: She is not in acute distress. Appearance: Normal appearance. She is well-developed. She is not ill-appearing. Cardiovascular:      Rate and Rhythm: Normal rate and regular rhythm. Heart sounds: Normal heart sounds. Comments: R foot PT and DP pulses 2+, toes warm and well-perfused, brisk cap refill  Pulmonary:      Effort: Pulmonary effort is normal.      Breath sounds: Normal breath sounds. Musculoskeletal:      Comments: R hip mildly decreased ROM, pain with passive internal and external rotation. Tenderness over greater trochanter. Neurological:      Mental Status: She is alert and oriented to person, place, and time.    Psychiatric:         Mood and Affect: Mood normal.         Behavior: Behavior normal.       Data:     Lab Results   Component Value Date/Time     03/08/2021 02:30 PM    K 3.7 03/08/2021 02:30 PM     03/08/2021 02:30 PM    CO2 28 03/08/2021 02:30 PM    BUN 7 03/08/2021 02:30 PM    CREATININE 0.85 06/16/2021 08:41 AM    GLUCOSE 96 03/08/2021 02:30 PM    PROT 7.0 03/08/2021 02:30 PM    LABALBU 4.7 03/08/2021 02:30 PM    BILITOT 0.45 03/08/2021 02:30 PM    ALKPHOS 89 03/08/2021 02:30 PM    AST 19 03/08/2021 02:30 PM    ALT <5 03/08/2021 02:30 PM     Lab Results   Component Value Date/Time    WBC 8.7 03/08/2021 02:30 PM    RBC 4.58 03/08/2021 02:30 PM    HGB 12.9 03/08/2021 02:30 PM    HCT 39.3 03/08/2021 02:30 PM    MCV 85.8 03/08/2021 02:30 PM    MCH 28.2 03/08/2021 02:30 PM    MCHC 32.9 03/08/2021 02:30 PM    RDW 13.6 03/08/2021 02:30 PM     03/08/2021 02:30 PM    MPV NOT REPORTED 03/08/2021 02:30 PM     Lab Results   Component Value Date/Time    TSH 1.28 03/08/2021 02:30 PM     Lab Results   Component Value Date/Time    CHOL 216 03/08/2021 02:30 PM    HDL 60 03/08/2021 02:30 PM     1/7/21 R hip MRI  1. Mild osteoarthrosis in the bilateral hips and sacroiliac joints. 2. No acute fracture, avascular necrosis, or significant bone marrow edema. 3. A 1.5 cm x 1 cm partial-thickness tear of the junction of the distalmost    right gluteus minimus and medius tendons, with surrounding tendinopathy. Mild    right greater trochanteric bursitis. Assessment & Plan:        Diagnosis Orders   1. Chronic right hip pain  [facility]      2. Chronic fatigue syndrome        3. Atypical chest pain        4. Screening for colorectal cancer  Vanesa Leach MD, Gastroenterology, [facility]        Chronic R hip pain, has had some radicular features with pain in anterior thigh and knee but most of pain in the lateral hip. Previous MRI showing GT Bursitis, partial gluteal tears. Has done PT but for lumbar radic, so PT ordered for the hip. CFS helped by adderall, cont same  Episode of chest pain radiating to L neck, had some GI symptoms, belching, and anxiety so I believe those were the cause of pain. No anginal symptoms since then. Monitor symptoms.          Requested Prescriptions     Signed Prescriptions Disp Refills    magnesium oxide (MAG-OX) 400 MG tablet 30 tablet 5     Sig: Take 1 tablet by mouth at bedtime    buPROPion (WELLBUTRIN XL) 150 MG extended release tablet 90 tablet 1     Sig: Take 1 tablet by mouth every morning    lamoTRIgine (LAMICTAL) 25 MG tablet 60 tablet 5     Sig: take 1 tablet by mouth twice a day         There are no Patient Instructions on file for this visit.      signed by Edgar Flores DO on 8/31/2022 at 11:34 PM  [place] [id number]  Dept: [phone]

## 2022-08-31 ENCOUNTER — TELEPHONE (OUTPATIENT)
Dept: GASTROENTEROLOGY | Age: 52
End: 2022-08-31

## 2022-10-07 DIAGNOSIS — G93.32 CHRONIC FATIGUE SYNDROME: ICD-10-CM

## 2022-10-07 RX ORDER — DEXTROAMPHETAMINE SACCHARATE, AMPHETAMINE ASPARTATE MONOHYDRATE, DEXTROAMPHETAMINE SULFATE AND AMPHETAMINE SULFATE 3.75; 3.75; 3.75; 3.75 MG/1; MG/1; MG/1; MG/1
CAPSULE, EXTENDED RELEASE ORAL
Qty: 30 CAPSULE | Refills: 0 | Status: SHIPPED | OUTPATIENT
Start: 2022-10-07 | End: 2022-11-04 | Stop reason: SDUPTHER

## 2022-10-07 NOTE — TELEPHONE ENCOUNTER
Pt called in would like her Adderral and Wellbutrin refilled.  To [facility] in [place] Reminded pt of her appt on 10/11/2022 at 9:20am .    Pt states she is not received her text reminders from [employer] advised her to contact them to figure out why it is not working       Last OV: 8/30/2022 Hip pain   Last RX:  Next scheduled apt: 10/11/2022

## 2022-10-10 ENCOUNTER — HOSPITAL ENCOUNTER (OUTPATIENT)
Dept: PHYSICAL THERAPY | Age: 52
Setting detail: THERAPIES SERIES
Discharge: HOME | End: 2022-10-10

## 2022-10-11 ENCOUNTER — OFFICE VISIT (OUTPATIENT)
Dept: FAMILY MEDICINE CLINIC | Age: 52
End: 2022-10-11
Payer: COMMERCIAL

## 2022-10-11 ENCOUNTER — HOSPITAL ENCOUNTER (OUTPATIENT)
Age: 52
Discharge: HOME | End: 2022-10-11
Payer: COMMERCIAL

## 2022-10-11 VITALS
DIASTOLIC BLOOD PRESSURE: 80 MMHG | HEART RATE: 84 BPM | HEIGHT: 68 IN | SYSTOLIC BLOOD PRESSURE: 128 MMHG | WEIGHT: 184 LBS | BODY MASS INDEX: 27.89 KG/M2 | OXYGEN SATURATION: 98 %

## 2022-10-11 DIAGNOSIS — G93.32 CHRONIC FATIGUE SYNDROME: Primary | ICD-10-CM

## 2022-10-11 DIAGNOSIS — G93.32 CHRONIC FATIGUE SYNDROME: ICD-10-CM

## 2022-10-11 DIAGNOSIS — F41.9 ANXIETY: ICD-10-CM

## 2022-10-11 DIAGNOSIS — Z12.12 ENCOUNTER FOR COLORECTAL CANCER SCREENING: ICD-10-CM

## 2022-10-11 DIAGNOSIS — Z12.11 ENCOUNTER FOR COLORECTAL CANCER SCREENING: ICD-10-CM

## 2022-10-11 DIAGNOSIS — G89.29 CHRONIC RIGHT HIP PAIN: ICD-10-CM

## 2022-10-11 DIAGNOSIS — Z13.6 SCREENING FOR CARDIOVASCULAR CONDITION: ICD-10-CM

## 2022-10-11 DIAGNOSIS — M25.551 CHRONIC RIGHT HIP PAIN: ICD-10-CM

## 2022-10-11 LAB
ABSOLUTE EOS #: 0.1 K/UL (ref 0–0.4)
ABSOLUTE LYMPH #: 1.6 K/UL (ref 1–4.8)
ABSOLUTE MONO #: 0.6 K/UL (ref 0–1)
ALBUMIN SERPL-MCNC: 4.2 G/DL (ref 3.5–5.2)
ALP BLD-CCNC: 95 U/L (ref 35–104)
ALT SERPL-CCNC: 11 U/L (ref 5–33)
ANION GAP SERPL CALCULATED.3IONS-SCNC: 10 MMOL/L (ref 9–17)
AST SERPL-CCNC: 17 U/L
BASOPHILS # BLD: 0 % (ref 0–2)
BASOPHILS ABSOLUTE: 0 K/UL (ref 0–0.2)
BILIRUB SERPL-MCNC: 0.5 MG/DL (ref 0.3–1.2)
BUN BLDV-MCNC: 6 MG/DL (ref 6–20)
BUN/CREAT BLD: 7 (ref 9–20)
CALCIUM SERPL-MCNC: 9.1 MG/DL (ref 8.6–10.4)
CHLORIDE BLD-SCNC: 104 MMOL/L (ref 98–107)
CO2: 27 MMOL/L (ref 20–31)
CREAT SERPL-MCNC: 0.88 MG/DL (ref 0.5–0.9)
DIFFERENTIAL TYPE: YES
EOSINOPHILS RELATIVE PERCENT: 1 % (ref 0–5)
GFR SERPL CREATININE-BSD FRML MDRD: >60 ML/MIN/1.73M2
GLUCOSE BLD-MCNC: 92 MG/DL (ref 70–99)
HCT VFR BLD CALC: 35.1 % (ref 36–46)
HEMOGLOBIN: 11.6 G/DL (ref 12–16)
LYMPHOCYTES # BLD: 23 % (ref 15–40)
MCH RBC QN AUTO: 28.4 PG (ref 26–34)
MCHC RBC AUTO-ENTMCNC: 33.1 G/DL (ref 31–37)
MCV RBC AUTO: 85.7 FL (ref 80–100)
MONOCYTES # BLD: 8 % (ref 4–8)
PATIENT FASTING?: YES
PDW BLD-RTO: 14.5 % (ref 12.1–15.2)
PLATELET # BLD: 360 K/UL (ref 140–450)
POTASSIUM SERPL-SCNC: 3.8 MMOL/L (ref 3.7–5.3)
RBC # BLD: 4.09 M/UL (ref 4–5.2)
SEG NEUTROPHILS: 68 % (ref 47–75)
SEGMENTED NEUTROPHILS ABSOLUTE COUNT: 4.7 K/UL (ref 2.5–7)
SODIUM BLD-SCNC: 141 MMOL/L (ref 135–144)
TOTAL PROTEIN: 6.9 G/DL (ref 6.4–8.3)
WBC # BLD: 7 K/UL (ref 3.5–11)

## 2022-10-11 PROCEDURE — 36415 COLL VENOUS BLD VENIPUNCTURE: CPT

## 2022-10-11 PROCEDURE — G8484 FLU IMMUNIZE NO ADMIN: HCPCS | Performed by: FAMILY MEDICINE

## 2022-10-11 PROCEDURE — G8419 CALC BMI OUT NRM PARAM NOF/U: HCPCS | Performed by: FAMILY MEDICINE

## 2022-10-11 PROCEDURE — 3017F COLORECTAL CA SCREEN DOC REV: CPT | Performed by: FAMILY MEDICINE

## 2022-10-11 PROCEDURE — 80053 COMPREHEN METABOLIC PANEL: CPT

## 2022-10-11 PROCEDURE — 85025 COMPLETE CBC W/AUTO DIFF WBC: CPT

## 2022-10-11 PROCEDURE — 99213 OFFICE O/P EST LOW 20 MIN: CPT | Performed by: FAMILY MEDICINE

## 2022-10-11 PROCEDURE — G8427 DOCREV CUR MEDS BY ELIG CLIN: HCPCS | Performed by: FAMILY MEDICINE

## 2022-10-11 PROCEDURE — 80061 LIPID PANEL: CPT

## 2022-10-11 PROCEDURE — 1036F TOBACCO NON-USER: CPT | Performed by: FAMILY MEDICINE

## 2022-10-11 NOTE — PROGRESS NOTES
Name: Cheng Hancock  : 1970         Chief Complaint:     Chief Complaint   Patient presents with    Hip Pain     Better since losing weight. Anxiety     controlled       History of Present Illness:      Cheng Hancock is a 50 y.o.  female who presents with Hip Pain (Better since losing weight. ) and Anxiety (controlled)      HPI    R hip doing a lot better, which she r/t weight loss. Has been walking a lot and also got a rowing machine. Eating better. Continues usual meds. Fatigue and low energy helped a lot by adderall. Trouble with sleep whether she's on days or nights. Hasn't been taking dosoquin. Mood has been good. Broke up with ex for good and is seeing someone new which has been very good so far. Continues celexa, lamictal, and wellbutrin. Medical History:     Patient Active Problem List   Diagnosis    Anxiety    Insomnia    HTN (hypertension)    Venous insufficiency    Overweight       Medications:       Prior to Admission medications    Medication Sig Start Date End Date Taking?  Authorizing Provider   amphetamine-dextroamphetamine (ADDERALL XR) 15 MG extended release capsule TAKE 1 CAPSULE BY MOUTH DAILY 10/7/22 11/6/22 Yes Ree Mckeon, DO   magnesium oxide (MAG-OX) 400 MG tablet Take 1 tablet by mouth at bedtime 22  Yes Ree Mckeon DO   buPROPion (WELLBUTRIN XL) 150 MG extended release tablet Take 1 tablet by mouth every morning 22  Yes Ree Mckeon DO   lamoTRIgine (LAMICTAL) 25 MG tablet take 1 tablet by mouth twice a day 22  Yes Ree Mckeon DO   citalopram (CELEXA) 40 MG tablet take 1 tablet by mouth once daily 8/10/22  Yes Ree Mckeon DO   naproxen (EC-NAPROSYN) 500 MG EC tablet Take 1 tablet by mouth 2 times daily (with meals) as needed for pain 8/10/22  Yes Ree Mckeon, DO   gabapentin (NEURONTIN) 300 MG capsule 300 mg every morning, 300 mg every afternoon, 600 mg every evening at bedtime 8/10/22 1/25/23 Yes Alisha LORENZO Randy Angulo,    pantoprazole (PROTONIX) 40 MG tablet take 1 tablet by mouth every morning before breakfast 7/7/22  Yes Harlan Pleitez DO   clobetasol (TEMOVATE) 0.05 % cream apply to affected area twice a day for 30 DAYS then if needed 3/18/21  Yes Historical Provider, MD   estradiol (ESTRACE) 0.1 MG/GM vaginal cream apply ONE PEA SIZE DOLLOP ON EXTERNAL VAGINAL AREA AND SWIPE INTO. ..  (REFER TO PRESCRIPTION NOTES). 3/18/21  Yes Historical Provider, MD   diclofenac sodium (VOLTAREN) 1 % GEL Apply topically 2 times daily   Yes Historical Provider, MD   Multiple Vitamins-Minerals (HAIR SKIN AND NAILS FORMULA PO) Take by mouth   Yes Historical Provider, MD   TURMERIC PO Take by mouth   Yes Historical Provider, MD   FOLIC ACID PO Take 6,812 mg by mouth daily    Yes Historical Provider, MD   saline nasal gel (AYR) GEL by Nasal route as needed for Congestion 5/31/19  Yes Harlan Pleitez DO   acetaminophen (TYLENOL) 325 MG tablet Take 650 mg by mouth every 6 hours as needed. Yes Historical Provider, MD        Allergies:       Vicodin [hydrocodone-acetaminophen]    Physical Exam:     Vitals:  /80   Pulse 84   Ht 5' 8\" (1.727 m)   Wt 184 lb (83.5 kg)   LMP 10/03/2017 (Exact Date)   SpO2 98%   BMI 27.98 kg/m²   Physical Exam  Vitals and nursing note reviewed. Constitutional:       General: She is not in acute distress. Appearance: She is well-developed. Pulmonary:      Effort: Pulmonary effort is normal.   Skin:     General: Skin is warm and dry. Neurological:      Mental Status: She is alert and oriented to person, place, and time.    Psychiatric:         Mood and Affect: Mood normal.         Behavior: Behavior normal.       Data:     Lab Results   Component Value Date/Time     10/11/2022 10:01 AM    K 3.8 10/11/2022 10:01 AM     10/11/2022 10:01 AM    CO2 27 10/11/2022 10:01 AM    BUN 6 10/11/2022 10:01 AM    CREATININE 0.88 10/11/2022 10:01 AM    GLUCOSE 92 10/11/2022 10:01 AM    PROT 6.9 10/11/2022 10:01 AM    LABALBU 4.2 10/11/2022 10:01 AM    BILITOT 0.5 10/11/2022 10:01 AM    ALKPHOS 95 10/11/2022 10:01 AM    AST 17 10/11/2022 10:01 AM    ALT 11 10/11/2022 10:01 AM     Lab Results   Component Value Date/Time    WBC 7.0 10/11/2022 10:01 AM    RBC 4.09 10/11/2022 10:01 AM    HGB 11.6 10/11/2022 10:01 AM    HCT 35.1 10/11/2022 10:01 AM    MCV 85.7 10/11/2022 10:01 AM    MCH 28.4 10/11/2022 10:01 AM    MCHC 33.1 10/11/2022 10:01 AM    RDW 14.5 10/11/2022 10:01 AM     10/11/2022 10:01 AM    MPV NOT REPORTED 03/08/2021 02:30 PM     Lab Results   Component Value Date/Time    TSH 1.28 03/08/2021 02:30 PM     Lab Results   Component Value Date/Time    CHOL 199 10/11/2022 10:01 AM    HDL 69 10/11/2022 10:01 AM         Assessment & Plan:        Diagnosis Orders   1. Chronic fatigue syndrome  CBC with Auto Differential      2. Anxiety        3. Chronic right hip pain        4. Encounter for colorectal cancer screening  POCT Fecal Immunochemical Test (FIT)      5. Screening for cardiovascular condition  Comprehensive Metabolic Panel    Lipid Panel        Fatigue and mood doing well, continue same Rx. Right hip pain improved also, which patient relates to weight loss but I think this is at best questionable as she's only lost 2 pounds. Suspect may be due to different physical activities. Requested Prescriptions      No prescriptions requested or ordered in this encounter         Patient Instructions   SURVEY:    You may be receiving a survey from [employer] regarding your visit today. You may get this in the mail, through your MyChart or in your email. Please complete the survey to enable us to provide the highest quality of care to you and your family. If you cannot score us as very good ( 5 Stars) on any question, please feel free to call the office to discuss how we could have made your experience exceptional.     Thank you.     Clinical Care Team:  Dr. Riri Ortega, DO Jim Gonsales, [address] Team:  Geremias Avina                                signed by Brian Nicole DO on 10/15/2022 at 3:32 PM  [address]  [address]  Dept: [phone]

## 2022-10-11 NOTE — PATIENT INSTRUCTIONS
SURVEY:    You may be receiving a survey from [employer] regarding your visit today. You may get this in the mail, through your MyChart or in your email. Please complete the survey to enable us to provide the highest quality of care to you and your family. If you cannot score us as very good ( 5 Stars) on any question, please feel free to call the office to discuss how we could have made your experience exceptional.     Thank you.     Clinical Care Team:  DO Roberto Rice, [address] [place] [address] Team:  [address]

## 2022-10-12 LAB
CHOLESTEROL/HDL RATIO: 2.9
CHOLESTEROL: 199 MG/DL
HDLC SERPL-MCNC: 69 MG/DL
LDL CHOLESTEROL: 116 MG/DL (ref 0–130)
TRIGL SERPL-MCNC: 71 MG/DL

## 2022-10-31 RX ORDER — GABAPENTIN 300 MG/1
CAPSULE ORAL
Qty: 120 CAPSULE | Refills: 2 | Status: SHIPPED | OUTPATIENT
Start: 2022-10-31 | End: 2023-01-31

## 2022-10-31 NOTE — TELEPHONE ENCOUNTER
Last OV: 10/11/2022  chronic   Last RX:    Next scheduled apt: Visit date not found        Surescript requesting a refill

## 2022-11-04 DIAGNOSIS — G93.32 CHRONIC FATIGUE SYNDROME: ICD-10-CM

## 2022-11-04 RX ORDER — DEXTROAMPHETAMINE SACCHARATE, AMPHETAMINE ASPARTATE MONOHYDRATE, DEXTROAMPHETAMINE SULFATE AND AMPHETAMINE SULFATE 3.75; 3.75; 3.75; 3.75 MG/1; MG/1; MG/1; MG/1
CAPSULE, EXTENDED RELEASE ORAL
Qty: 30 CAPSULE | Refills: 0 | Status: SHIPPED | OUTPATIENT
Start: 2022-11-04 | End: 2022-12-04

## 2022-11-04 NOTE — TELEPHONE ENCOUNTER
Adderall 15 mg    [facility]      Last appointment 10/11/22    Health Maintenance   Topic Date Due    Cervical cancer screen  Never done    Colorectal Cancer Screen  Never done    COVID-19 Vaccine (2 - Pfizer series) 10/13/2021    Flu vaccine (1) Never done    Shingles vaccine (1 of 2) Never done    Depression Monitoring  08/30/2023    Breast cancer screen  06/20/2024    Lipids  10/11/2027    DTaP/Tdap/Td vaccine (2 - Td or Tdap) 11/29/2028    Hepatitis C screen  Completed    HIV screen  Completed    Hepatitis A vaccine  Aged Out    Hib vaccine  Aged Out    Meningococcal (ACWY) vaccine  Aged Out    Pneumococcal 0-64 years Vaccine  Aged Out             (applicable per patient's age: Cancer Screenings, Depression Screening, Fall Risk Screening, Immunizations)    LDL Cholesterol (mg/dL)   Date Value   10/11/2022 116     AST (U/L)   Date Value   10/11/2022 17     ALT (U/L)   Date Value   10/11/2022 11     BUN (mg/dL)   Date Value   10/11/2022 6      (goal A1C is < 7)   (goal LDL is <100) need 30-50% reduction from baseline     BP Readings from Last 3 Encounters:   10/11/22 128/80   08/30/22 124/78   08/03/22 131/85    (goal /80)      All Future Testing planned in [facility]:  Lab Frequency Next Occurrence   POCT Fecal Immunochemical Test (FIT) Once 11/01/2022       Next Visit Date:  No future appointments.          Patient Active Problem List:     Anxiety     Insomnia     HTN (hypertension)     Venous insufficiency     Overweight

## 2022-11-07 ENCOUNTER — OFFICE VISIT (OUTPATIENT)
Dept: FAMILY MEDICINE CLINIC | Age: 52
End: 2022-11-07
Payer: COMMERCIAL

## 2022-11-07 ENCOUNTER — HOSPITAL ENCOUNTER (OUTPATIENT)
Dept: GENERAL RADIOLOGY | Age: 52
Discharge: HOME | End: 2022-11-09
Payer: COMMERCIAL

## 2022-11-07 ENCOUNTER — TELEPHONE (OUTPATIENT)
Dept: FAMILY MEDICINE CLINIC | Age: 52
End: 2022-11-07

## 2022-11-07 ENCOUNTER — HOSPITAL ENCOUNTER (OUTPATIENT)
Age: 52
Discharge: HOME | End: 2022-11-09
Payer: COMMERCIAL

## 2022-11-07 VITALS
SYSTOLIC BLOOD PRESSURE: 136 MMHG | DIASTOLIC BLOOD PRESSURE: 82 MMHG | TEMPERATURE: 98.5 F | HEART RATE: 84 BPM | BODY MASS INDEX: 26.76 KG/M2 | WEIGHT: 176 LBS | OXYGEN SATURATION: 99 %

## 2022-11-07 DIAGNOSIS — M79.642 PAIN OF LEFT HAND: ICD-10-CM
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
## 2022-11-07 DIAGNOSIS — R07.81 RIB PAIN ON LEFT SIDE: ICD-10-CM

## 2022-11-07 DIAGNOSIS — M67.912 DYSFUNCTION OF LEFT ROTATOR CUFF: ICD-10-CM
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
## 2022-11-07 PROCEDURE — 99213 OFFICE O/P EST LOW 20 MIN: CPT | Performed by: NURSE PRACTITIONER

## 2022-11-07 PROCEDURE — 3074F SYST BP LT 130 MM HG: CPT | Performed by: NURSE PRACTITIONER

## 2022-11-07 PROCEDURE — 73130 X-RAY EXAM OF HAND: CPT

## 2022-11-07 PROCEDURE — 71100 X-RAY EXAM RIBS UNI 2 VIEWS: CPT

## 2022-11-07 PROCEDURE — G8484 FLU IMMUNIZE NO ADMIN: HCPCS | Performed by: NURSE PRACTITIONER

## 2022-11-07 PROCEDURE — G8419 CALC BMI OUT NRM PARAM NOF/U: HCPCS | Performed by: NURSE PRACTITIONER

## 2022-11-07 PROCEDURE — 3078F DIAST BP <80 MM HG: CPT | Performed by: NURSE PRACTITIONER

## 2022-11-07 PROCEDURE — 3017F COLORECTAL CA SCREEN DOC REV: CPT | Performed by: NURSE PRACTITIONER

## 2022-11-07 PROCEDURE — G8427 DOCREV CUR MEDS BY ELIG CLIN: HCPCS | Performed by: NURSE PRACTITIONER

## 2022-11-07 PROCEDURE — 1036F TOBACCO NON-USER: CPT | Performed by: NURSE PRACTITIONER

## 2022-11-07 RX ORDER — DICLOFENAC SODIUM 75 MG/1
75 TABLET, DELAYED RELEASE ORAL 2 TIMES DAILY
Qty: 60 TABLET | Refills: 2 | Status: SHIPPED | OUTPATIENT
Start: 2022-11-07

## 2022-11-07 ASSESSMENT — ENCOUNTER SYMPTOMS
NAUSEA: 0
DIARRHEA: 0
VOMITING: 0
SHORTNESS OF BREATH: 0
COUGH: 0

## 2022-11-07 NOTE — TELEPHONE ENCOUNTER
Patient saw CARLA Reilly today they will call her when they receive results. I called patient on labs drawn in October.

## 2022-11-07 NOTE — LETTER
[facility] PRIMARY CARE [facility]ON[address] [address] [address]  Phone: [phone]  Fax: Andrew Sharp [phone], APRN - CNP        November 7, 2022     Patient: Roslyn Pang   YOB: 1970   Date of Visit: 11/7/2022       To Whom It May Concern: It is my medical opinion that Edrie Skill may return to full duty immediately with no restrictions. If you have any questions or concerns, please don't hesitate to call. Sincerely,          Dr Nilda Lainez.  [facility] APRN-CNP

## 2022-11-07 NOTE — PROGRESS NOTES
HPI Notes    Name: Tawnya White  : 1970         Chief Complaint:     Chief Complaint   Patient presents with    Fall     Patient complains of fall on 22 , she was at [facility] and fainted. She complains of left upper rib cage pain, shoulder pain, left hand pain. History of Present Illness:        HPI  Pt is a 51 yo female who presents after a fall. [place] she [place], falling down and landing on her left side. Pt had not eaten in two days because she was trying to diet. This has happened to pt in the past about 15 years ago. Pt feels pain in her left shoulder, left ribs, and left hand. Has not taken any medications or tried anything for the pain. Past Medical History:     Past Medical History:   Diagnosis Date    Anxiety     Arthritis     Asthma     as child    Depression     Osteoarthritis     Ulcer     stomach    Wears glasses       Reviewed all health maintenance requirements and ordered appropriate tests  Health Maintenance Due   Topic Date Due    Cervical cancer screen  Never done    Colorectal Cancer Screen  Never done    COVID-19 Vaccine (2 - Pfizer series) 10/13/2021    Flu vaccine (1) Never done    Shingles vaccine (1 of 2) Never done       Past Surgical History:     Past Surgical History:   Procedure Laterality Date     SECTION      CHOLECYSTECTOMY      CHOLECYSTECTOMY, LAPAROSCOPIC N/A 2017    CHOLECYSTECTOMY LAPAROSCOPIC; photos performed by Edilson Ulrich MD at [address] [place] [address] 2021    L 4-5 RIGHT INTERLAMINAR EPIDURAL INJECTION-  IMAGING GUIDANCE performed by Alexandre Astorga MD at [address] [place] [address]          Medications:       Prior to Admission medications    Medication Sig Start Date End Date Taking?  Authorizing Provider   diclofenac sodium (VOLTAREN) 1 % GEL Apply 2 g topically 4 times daily as needed for Pain 22  Yes Mathieu Yeh APRN - CNP   diclofenac (VOLTAREN) 75 MG EC tablet Take 1 tablet by mouth 2 times daily 11/7/22  Yes Lilia Christine, APRN - CNP   amphetamine-dextroamphetamine (ADDERALL XR) 15 MG extended release capsule TAKE 1 CAPSULE BY MOUTH DAILY 11/4/22 12/4/22 Yes Pricila Richard, DO   gabapentin (NEURONTIN) 300 MG capsule take 1 capsule by mouth every morning 1 capsule by mouth every AFTERNOON and 2 capsules by mouth every evening at bedtime 10/31/22 1/31/23 Yes Pricila Richard, DO   magnesium oxide (MAG-OX) 400 MG tablet Take 1 tablet by mouth at bedtime 8/30/22  Yes Pricila Richard, DO   buPROPion (WELLBUTRIN XL) 150 MG extended release tablet Take 1 tablet by mouth every morning 8/30/22  Yes Pricila Richard, DO   lamoTRIgine (LAMICTAL) 25 MG tablet take 1 tablet by mouth twice a day 8/30/22  Yes Pricila Richard DO   citalopram (CELEXA) 40 MG tablet take 1 tablet by mouth once daily 8/10/22  Yes Pricila Richard DO   naproxen (EC-NAPROSYN) 500 MG EC tablet Take 1 tablet by mouth 2 times daily (with meals) as needed for pain 8/10/22  Yes Pricila Richard DO   pantoprazole (PROTONIX) 40 MG tablet take 1 tablet by mouth every morning before breakfast 7/7/22  Yes Pricila Richard DO   clobetasol (TEMOVATE) 0.05 % cream apply to affected area twice a day for 30 DAYS then if needed 3/18/21  Yes Historical Provider, MD   estradiol (ESTRACE) 0.1 MG/GM vaginal cream apply ONE PEA SIZE DOLLOP ON EXTERNAL VAGINAL AREA AND SWIPE INTO. ..  (REFER TO PRESCRIPTION NOTES).  3/18/21  Yes Historical Provider, MD   Multiple Vitamins-Minerals (HAIR SKIN AND NAILS FORMULA PO) Take by mouth   Yes Historical Provider, MD   TURMERIC PO Take by mouth   Yes Historical Provider, MD   FOLIC ACID PO Take 3,988 mg by mouth daily    Yes Historical Provider, MD   saline nasal gel (AYR) GEL by Nasal route as needed for Congestion 5/31/19  Yes Pricila Richard DO   acetaminophen (TYLENOL) 325 MG tablet Take 650 mg by mouth every 6 hours as needed. Yes Historical Provider, MD        Allergies:       Vicodin [hydrocodone-acetaminophen]    Social History:     Tobacco:    reports that she quit smoking about 21 years ago. Her smoking use included cigarettes. She has a 6.00 pack-year smoking history. She has never used smokeless tobacco.  Alcohol:      reports no history of alcohol use. Drug Use:  reports no history of drug use. Family History:     Family History   Problem Relation Age of Onset    Diabetes Mother     Cancer Mother         Liver Cancer    Heart Disease Father        Review of Systems:         Review of Systems   Constitutional:  Negative for chills and fever. Respiratory:  Negative for cough and shortness of breath. Cardiovascular:  Positive for chest pain (left ribs). Negative for palpitations. Gastrointestinal:  Negative for diarrhea, nausea and vomiting. Musculoskeletal:  Positive for arthralgias. Neurological:  Negative for dizziness, seizures and headaches. Physical Exam:     Vitals:  /82   Pulse 84   Temp 98.5 °F (36.9 °C) (Oral)   Wt 176 lb (79.8 kg)   LMP 10/03/2017 (Exact Date)   SpO2 99%   BMI 26.76 kg/m²       Physical Exam  Vitals and nursing note reviewed. Constitutional:       Appearance: Normal appearance. She is well-developed. Cardiovascular:      Rate and Rhythm: Normal rate and regular rhythm. Heart sounds: Normal heart sounds, S1 normal and S2 normal.   Pulmonary:      Effort: Pulmonary effort is normal. No respiratory distress. Breath sounds: Normal breath sounds. Chest:          Comments: TTP left ribs around the area of 5,6,7  Abdominal:      General: Bowel sounds are normal.      Palpations: Abdomen is soft. Tenderness: There is no abdominal tenderness. Musculoskeletal:      Left shoulder: Tenderness present. Decreased range of motion. Left hand: Tenderness present.       Comments: +apley scratch test left shoulder    TTP left medial aspect of hand Skin:     General: Skin is warm and dry. Neurological:      Mental Status: She is alert and oriented to person, place, and time. Data:     Lab Results   Component Value Date/Time     10/11/2022 10:01 AM    K 3.8 10/11/2022 10:01 AM     10/11/2022 10:01 AM    CO2 27 10/11/2022 10:01 AM    BUN 6 10/11/2022 10:01 AM    CREATININE 0.88 10/11/2022 10:01 AM    GLUCOSE 92 10/11/2022 10:01 AM    PROT 6.9 10/11/2022 10:01 AM    LABALBU 4.2 10/11/2022 10:01 AM    BILITOT 0.5 10/11/2022 10:01 AM    ALKPHOS 95 10/11/2022 10:01 AM    AST 17 10/11/2022 10:01 AM    ALT 11 10/11/2022 10:01 AM     Lab Results   Component Value Date/Time    WBC 7.0 10/11/2022 10:01 AM    RBC 4.09 10/11/2022 10:01 AM    HGB 11.6 10/11/2022 10:01 AM    HCT 35.1 10/11/2022 10:01 AM    MCV 85.7 10/11/2022 10:01 AM    MCH 28.4 10/11/2022 10:01 AM    MCHC 33.1 10/11/2022 10:01 AM    RDW 14.5 10/11/2022 10:01 AM     10/11/2022 10:01 AM    MPV NOT REPORTED 03/08/2021 02:30 PM     Lab Results   Component Value Date/Time    TSH 1.28 03/08/2021 02:30 PM     Lab Results   Component Value Date/Time    CHOL 199 10/11/2022 10:01 AM    HDL 69 10/11/2022 10:01 AM          Assessment & Plan        Diagnosis Orders   1. Fall, initial encounter  XR RIBS LEFT (2 VIEWS)    XR HAND LEFT (MIN 3 VIEWS)      2. Rib pain on left side        3. Pain of left hand  XR HAND LEFT (MIN 3 VIEWS)      4. Dysfunction of left rotator cuff          Will start on diclofenac twice daily. Also use diclofenac gel as needed. We will send for x-rays. Patient verbalizes understanding and agreement with plan. All questions answered. If symptoms do not resolve or worsen, return to office.                  Completed Refills   Requested Prescriptions     Signed Prescriptions Disp Refills    diclofenac sodium (VOLTAREN) 1 % GEL 50 g 1     Sig: Apply 2 g topically 4 times daily as needed for Pain    diclofenac (VOLTAREN) 75 MG EC tablet 60 tablet 2     Sig: Take 1 tablet by mouth 2 times daily     No follow-ups on file. Orders Placed This Encounter   Medications    diclofenac sodium (VOLTAREN) 1 % GEL     Sig: Apply 2 g topically 4 times daily as needed for Pain     Dispense:  50 g     Refill:  1    diclofenac (VOLTAREN) 75 MG EC tablet     Sig: Take 1 tablet by mouth 2 times daily     Dispense:  60 tablet     Refill:  2     Orders Placed This Encounter   Procedures    XR RIBS LEFT (2 VIEWS)     Standing Status:   Future     Number of Occurrences:   1     Standing Expiration Date:   11/7/2023     Order Specific Question:   Reason for exam:     Answer:   left rib pain after fall    XR HAND LEFT (MIN 3 VIEWS)     Standing Status:   Future     Number of Occurrences:   1     Standing Expiration Date:   11/7/2023     Order Specific Question:   Reason for exam:     Answer:   left 5th digit side of hand pain         Patient Instructions   SURVEY:    You may be receiving a survey from [employer] regarding your visit today. Please complete the survey to enable us to provide the highest quality of care to you and your family. If you cannot score us a very good (5 Stars) on any question, please call the office to discuss how we could have made your experience a very good one. Thank you. Clinical Care Team: DEANDRA Morales-SUPA Sanchez LPN    Clerical Team: Tameka Martínez Patient Education        Rotator Cuff: Exercises  Introduction  Here are some examples of exercises for you to try. The exercises may be suggested for a condition or for rehabilitation. Start each exercise slowly. Ease off the exercises if you start to have pain. You will be told when to start these exercises and which ones will work best for you. How to do the exercises  Pendulum swing  If you have pain in your back, do not do this exercise.   Hold on to a table or the back of a chair with your good arm. Then bend forward a little and let your sore arm hang straight down. This exercise does not use the arm muscles. Rather, use your legs and your hips to create movement that makes your arm swing freely. Use the movement from your hips and legs to guide the slightly swinging arm back and forth like a pendulum (or elephant trunk). Then guide it in circles that start small (about the size of a dinner plate). Make the circles a bit larger each day, as your pain allows. Do this exercise for 5 minutes, 5 to 7 times each day. As you have less pain, try bending over a little farther to do this exercise. This will increase the amount of movement at your shoulder. Posterior stretching exercise  Hold the elbow of your injured arm with your other hand. Use your hand to pull your injured arm gently up and across your body. You will feel a gentle stretch across the back of your injured shoulder. Hold for at least 15 to 30 seconds. Then slowly lower your arm. Repeat 2 to 4 times. Up-the-back stretch  Your doctor or physical therapist may want you to wait to do this stretch until you have regained most of your range of motion and strength. You can do this stretch in different ways. Hold any of these stretches for at least 15 to 30 seconds. Repeat them 2 to 4 times. Light stretch: Put your hand in your back pocket. Let it rest there to stretch your shoulder. Moderate stretch: With your other hand, hold your injured arm (palm outward) behind your back by the wrist. Pull your arm up gently to stretch your shoulder. Advanced stretch: Put a towel over your other shoulder. Put the hand of your injured arm behind your back. Now hold the back end of the towel. With the other hand, hold the front end of the towel in front of your body. Pull gently on the front end of the towel. This will bring your hand farther up your back to stretch your shoulder.   Overhead stretch  Standing about an arm's length away, grasp onto a solid surface. You could use a countertop, a doorknob, or the back of a sturdy chair. With your knees slightly bent, bend forward with your arms straight. Lower your upper body, and let your shoulders stretch. As your shoulders are able to stretch farther, you may need to take a step or two backward. Hold for at least 15 to 30 seconds. Then stand up and relax. If you had stepped back during your stretch, step forward so you can keep your hands on the solid surface. Repeat 2 to 4 times. Shoulder flexion (lying down)  To make a wand for this exercise, use a piece of PVC pipe or a broom handle with the broom removed. Make the wand about a foot wider than your shoulders. Lie on your back, holding a wand with both hands. Your palms should face down as you hold the wand. Keeping your elbows straight, slowly raise your arms over your head. Raise them until you feel a stretch in your shoulders, upper back, and chest.  Hold for 15 to 30 seconds. Repeat 2 to 4 times. Shoulder rotation (lying down)  To make a wand for this exercise, use a piece of PVC pipe or a broom handle with the broom removed. Make the wand about a foot wider than your shoulders. Lie on your back. Hold a wand with both hands with your elbows bent and palms up. Keep your elbows close to your body, and move the wand across your body toward the sore arm. Hold for 8 to 12 seconds. Repeat 2 to 4 times. Wall climbing (to the side)  Avoid any movement that is straight to your side, and be careful not to arch your back. Your arm should stay about 30 degrees to the front of your side. Stand with your side to a wall so that your fingers can just touch it at an angle about 30 degrees toward the front of your body. Walk the fingers of your injured arm up the wall as high as pain permits. Try not to shrug your shoulder up toward your ear as you move your arm up. Hold that position for a count of at least 15 to 20.   Walk your fingers back down to the starting position. Repeat at least 2 to 4 times. Try to reach higher each time. Wall climbing (to the front)  During this stretching exercise, be careful not to arch your back. Face a wall, and stand so your fingers can just touch it. Keeping your shoulder down, walk the fingers of your injured arm up the wall as high as pain permits. (Don't shrug your shoulder up toward your ear.)  Hold your arm in that position for at least 15 to 30 seconds. Slowly walk your fingers back down to where you started. Repeat at least 2 to 4 times. Try to reach higher each time. Shoulder blade squeeze  Stand with your arms at your sides, and squeeze your shoulder blades together. Do not raise your shoulders up as you squeeze. Hold 6 seconds. Repeat 8 to 12 times. Scapular exercise: Arm reach  Lie flat on your back. This exercise is a very slight motion that starts with your arms raised (elbows straight, arms straight). From this position, reach higher toward the desmond or ceiling. Keep your elbows straight. All motion should be from your shoulder blade only. Relax your arms back to where you started. Repeat 8 to 12 times. Arm raise to the side  During this strengthening exercise, your arm should stay about 30 degrees to the front of your side. Slowly raise your injured arm to the side, with your thumb facing up. Raise your arm 60 degrees at the most (shoulder level is 90 degrees). Hold the position for 3 to 5 seconds. Then lower your arm back to your side. If you need to, bring your \"good\" arm across your body and place it under the elbow as you lower your injured arm. Use your good arm to keep your injured arm from dropping down too fast.  Repeat 8 to 12 times. When you first start out, don't hold any extra weight in your hand. As you get stronger, you may use a 1-pound to 2-pound dumbbell or a small can of food. Shoulder flexor and extensor exercise  These are isometric exercises.  That means you contract your muscles without actually moving. Push forward (flex): Stand facing a wall or doorjamb, about 6 inches or less back. Hold your injured arm against your body. Make a closed fist with your thumb on top. Then gently push your hand forward into the wall with about 25% to 50% of your strength. Don't let your body move backward as you push. Hold for about 6 seconds. Relax for a few seconds. Repeat 8 to 12 times. Push backward (extend): Stand with your back flat against a wall. Your upper arm should be against the wall, with your elbow bent 90 degrees (your hand straight ahead). Push your elbow gently back against the wall with about 25% to 50% of your strength. Don't let your body move forward as you push. Hold for about 6 seconds. Relax for a few seconds. Repeat 8 to 12 times. Scapular exercise: Wall push-ups  This exercise is best done with your fingers somewhat turned out, rather than straight up and down. Stand facing a wall, about 12 inches to 18 inches away. Place your hands on the wall at shoulder height. Slowly bend your elbows and bring your face to the wall. Keep your back and hips straight. Push back to where you started. Repeat 8 to 12 times. When you can do this exercise against a wall comfortably, you can try it against a counter. You can then slowly progress to the end of a couch, then to a sturdy chair, and finally to the floor. Scapular exercise: Retraction  For this exercise, you will need elastic exercise material, such as surgical tubing or Thera-Band. Put the band around a solid object at about waist level. (A bedpost will work well.) Each hand should hold an end of the band. With your elbows at your sides and bent to 90 degrees, pull the band back. Your shoulder blades should move toward each other. Then move your arms back where you started. Repeat 8 to 12 times. If you have good range of motion in your shoulders, try this exercise with your arms lifted out to the sides.  Keep your elbows at a 90-degree angle. Raise the elastic band up to about shoulder level. Pull the band back to move your shoulder blades toward each other. Then move your arms back where you started. Internal rotator strengthening exercise  Start by tying a piece of elastic exercise material to a doorknob. You can use surgical tubing or Thera-Band. Stand or sit with your shoulder relaxed and your elbow bent 90 degrees. Your upper arm should rest comfortably against your side. Squeeze a rolled towel between your elbow and your body for comfort. This will help keep your arm at your side. Hold one end of the elastic band in the hand of the painful arm. Slowly rotate your forearm toward your body until it touches your belly. Slowly move it back to where you started. Keep your elbow and upper arm firmly tucked against the towel roll or at your side. Repeat 8 to 12 times. External rotator strengthening exercise  Start by tying a piece of elastic exercise material to a doorknob. You can use surgical tubing or Thera-Band. (You may also hold one end of the band in each hand.)  Stand or sit with your shoulder relaxed and your elbow bent 90 degrees. Your upper arm should rest comfortably against your side. Squeeze a rolled towel between your elbow and your body for comfort. This will help keep your arm at your side. Hold one end of the elastic band with the hand of the painful arm. Start with your forearm across your belly. Slowly rotate the forearm out away from your body. Keep your elbow and upper arm tucked against the towel roll or the side of your body until you begin to feel tightness in your shoulder. Slowly move your arm back to where you started. Repeat 8 to 12 times. Follow-up care is a key part of your treatment and safety. Be sure to make and go to all appointments, and call your doctor if you are having problems.  It's also a good idea to know your test results and keep a list of the medicines you take.  Current as of: March 9, 2022               Content Version: 13.4  © 2006-2022 [employer]. Care instructions adapted under license by [facility] ([facility]). If you have questions about a medical condition or this instruction, always ask your healthcare professional. [address]  any warranty or liability for your use of this information.        Electronically signed by DEANDRA Yang CNP on 11/7/2022 at 10:07 AM           Completed Refills   Requested Prescriptions     Signed Prescriptions Disp Refills    diclofenac sodium (VOLTAREN) 1 % GEL 50 g 1     Sig: Apply 2 g topically 4 times daily as needed for Pain    diclofenac (VOLTAREN) 75 MG EC tablet 60 tablet 2     Sig: Take 1 tablet by mouth 2 times daily

## 2022-11-07 NOTE — PATIENT INSTRUCTIONS
SURVEY:    You may be receiving a survey from [employer] regarding your visit today. Please complete the survey to enable us to provide the highest quality of care to you and your family. If you cannot score us a very good (5 Stars) on any question, please call the office to discuss how we could have made your experience a very good one. Thank you. Clinical Care Team: DEANDRA Crane-SUPA Smith LPN    Clerical Team: Tameka Martínez Patient Education        Rotator Cuff: Exercises  Introduction  Here are some examples of exercises for you to try. The exercises may be suggested for a condition or for rehabilitation. Start each exercise slowly. Ease off the exercises if you start to have pain. You will be told when to start these exercises and which ones will work best for you. How to do the exercises  Pendulum swing  If you have pain in your back, do not do this exercise. Hold on to a table or the back of a chair with your good arm. Then bend forward a little and let your sore arm hang straight down. This exercise does not use the arm muscles. Rather, use your legs and your hips to create movement that makes your arm swing freely. Use the movement from your hips and legs to guide the slightly swinging arm back and forth like a pendulum (or elephant trunk). Then guide it in circles that start small (about the size of a dinner plate). Make the circles a bit larger each day, as your pain allows. Do this exercise for 5 minutes, 5 to 7 times each day. As you have less pain, try bending over a little farther to do this exercise. This will increase the amount of movement at your shoulder. Posterior stretching exercise  Hold the elbow of your injured arm with your other hand. Use your hand to pull your injured arm gently up and across your body.  You will feel a gentle stretch across the back of your injured shoulder. Hold for at least 15 to 30 seconds. Then slowly lower your arm. Repeat 2 to 4 times. Up-the-back stretch  Your doctor or physical therapist may want you to wait to do this stretch until you have regained most of your range of motion and strength. You can do this stretch in different ways. Hold any of these stretches for at least 15 to 30 seconds. Repeat them 2 to 4 times. Light stretch: Put your hand in your back pocket. Let it rest there to stretch your shoulder. Moderate stretch: With your other hand, hold your injured arm (palm outward) behind your back by the wrist. Pull your arm up gently to stretch your shoulder. Advanced stretch: Put a towel over your other shoulder. Put the hand of your injured arm behind your back. Now hold the back end of the towel. With the other hand, hold the front end of the towel in front of your body. Pull gently on the front end of the towel. This will bring your hand farther up your back to stretch your shoulder. Overhead stretch  Standing about an arm's length away, grasp onto a solid surface. You could use a countertop, a doorknob, or the back of a sturdy chair. With your knees slightly bent, bend forward with your arms straight. Lower your upper body, and let your shoulders stretch. As your shoulders are able to stretch farther, you may need to take a step or two backward. Hold for at least 15 to 30 seconds. Then stand up and relax. If you had stepped back during your stretch, step forward so you can keep your hands on the solid surface. Repeat 2 to 4 times. Shoulder flexion (lying down)  To make a wand for this exercise, use a piece of PVC pipe or a broom handle with the broom removed. Make the wand about a foot wider than your shoulders. Lie on your back, holding a wand with both hands. Your palms should face down as you hold the wand. Keeping your elbows straight, slowly raise your arms over your head.  Raise them until you feel a stretch in your shoulders, upper back, and chest.  Hold for 15 to 30 seconds. Repeat 2 to 4 times. Shoulder rotation (lying down)  To make a wand for this exercise, use a piece of PVC pipe or a broom handle with the broom removed. Make the wand about a foot wider than your shoulders. Lie on your back. Hold a wand with both hands with your elbows bent and palms up. Keep your elbows close to your body, and move the wand across your body toward the sore arm. Hold for 8 to 12 seconds. Repeat 2 to 4 times. Wall climbing (to the side)  Avoid any movement that is straight to your side, and be careful not to arch your back. Your arm should stay about 30 degrees to the front of your side. Stand with your side to a wall so that your fingers can just touch it at an angle about 30 degrees toward the front of your body. Walk the fingers of your injured arm up the wall as high as pain permits. Try not to shrug your shoulder up toward your ear as you move your arm up. Hold that position for a count of at least 15 to 20. Walk your fingers back down to the starting position. Repeat at least 2 to 4 times. Try to reach higher each time. Wall climbing (to the front)  During this stretching exercise, be careful not to arch your back. Face a wall, and stand so your fingers can just touch it. Keeping your shoulder down, walk the fingers of your injured arm up the wall as high as pain permits. (Don't shrug your shoulder up toward your ear.)  Hold your arm in that position for at least 15 to 30 seconds. Slowly walk your fingers back down to where you started. Repeat at least 2 to 4 times. Try to reach higher each time. Shoulder blade squeeze  Stand with your arms at your sides, and squeeze your shoulder blades together. Do not raise your shoulders up as you squeeze. Hold 6 seconds. Repeat 8 to 12 times. Scapular exercise: Arm reach  Lie flat on your back.  This exercise is a very slight motion that starts with your arms raised (elbows straight, arms straight). From this position, reach higher toward the desmond or ceiling. Keep your elbows straight. All motion should be from your shoulder blade only. Relax your arms back to where you started. Repeat 8 to 12 times. Arm raise to the side  During this strengthening exercise, your arm should stay about 30 degrees to the front of your side. Slowly raise your injured arm to the side, with your thumb facing up. Raise your arm 60 degrees at the most (shoulder level is 90 degrees). Hold the position for 3 to 5 seconds. Then lower your arm back to your side. If you need to, bring your \"good\" arm across your body and place it under the elbow as you lower your injured arm. Use your good arm to keep your injured arm from dropping down too fast.  Repeat 8 to 12 times. When you first start out, don't hold any extra weight in your hand. As you get stronger, you may use a 1-pound to 2-pound dumbbell or a small can of food. Shoulder flexor and extensor exercise  These are isometric exercises. That means you contract your muscles without actually moving. Push forward (flex): Stand facing a wall or doorjamb, about 6 inches or less back. Hold your injured arm against your body. Make a closed fist with your thumb on top. Then gently push your hand forward into the wall with about 25% to 50% of your strength. Don't let your body move backward as you push. Hold for about 6 seconds. Relax for a few seconds. Repeat 8 to 12 times. Push backward (extend): Stand with your back flat against a wall. Your upper arm should be against the wall, with your elbow bent 90 degrees (your hand straight ahead). Push your elbow gently back against the wall with about 25% to 50% of your strength. Don't let your body move forward as you push. Hold for about 6 seconds. Relax for a few seconds. Repeat 8 to 12 times.   Scapular exercise: Wall push-ups  This exercise is best done with your fingers somewhat turned out, rather than straight up and down. Stand facing a wall, about 12 inches to 18 inches away. Place your hands on the wall at shoulder height. Slowly bend your elbows and bring your face to the wall. Keep your back and hips straight. Push back to where you started. Repeat 8 to 12 times. When you can do this exercise against a wall comfortably, you can try it against a counter. You can then slowly progress to the end of a couch, then to a sturdy chair, and finally to the floor. Scapular exercise: Retraction  For this exercise, you will need elastic exercise material, such as surgical tubing or Thera-Band. Put the band around a solid object at about waist level. (A bedpost will work well.) Each hand should hold an end of the band. With your elbows at your sides and bent to 90 degrees, pull the band back. Your shoulder blades should move toward each other. Then move your arms back where you started. Repeat 8 to 12 times. If you have good range of motion in your shoulders, try this exercise with your arms lifted out to the sides. Keep your elbows at a 90-degree angle. Raise the elastic band up to about shoulder level. Pull the band back to move your shoulder blades toward each other. Then move your arms back where you started. Internal rotator strengthening exercise  Start by tying a piece of elastic exercise material to a doorknob. You can use surgical tubing or Thera-Band. Stand or sit with your shoulder relaxed and your elbow bent 90 degrees. Your upper arm should rest comfortably against your side. Squeeze a rolled towel between your elbow and your body for comfort. This will help keep your arm at your side. Hold one end of the elastic band in the hand of the painful arm. Slowly rotate your forearm toward your body until it touches your belly. Slowly move it back to where you started. Keep your elbow and upper arm firmly tucked against the towel roll or at your side. Repeat 8 to 12 times.   External rotator strengthening exercise  Start by tying a piece of elastic exercise material to a doorknob. You can use surgical tubing or Thera-Band. (You may also hold one end of the band in each hand.)  Stand or sit with your shoulder relaxed and your elbow bent 90 degrees. Your upper arm should rest comfortably against your side. Squeeze a rolled towel between your elbow and your body for comfort. This will help keep your arm at your side. Hold one end of the elastic band with the hand of the painful arm. Start with your forearm across your belly. Slowly rotate the forearm out away from your body. Keep your elbow and upper arm tucked against the towel roll or the side of your body until you begin to feel tightness in your shoulder. Slowly move your arm back to where you started. Repeat 8 to 12 times. Follow-up care is a key part of your treatment and safety. Be sure to make and go to all appointments, and call your doctor if you are having problems. It's also a good idea to know your test results and keep a list of the medicines you take. Current as of: March 9, 2022               Content Version: 13.4  © 2006-2022 [employer]. Care instructions adapted under license by [facility] ([facility]). If you have questions about a medical condition or this instruction, always ask your healthcare professional. [address] any warranty or liability for your use of this information.

## 2022-11-07 NOTE — TELEPHONE ENCOUNTER
Patient would like a call back when Xray results come back and for recent labs to be explained again. Patient expressed wantind to know about results for liver and Cholesterol.

## 2022-11-14 ENCOUNTER — TELEPHONE (OUTPATIENT)
Dept: FAMILY MEDICINE CLINIC | Age: 52
End: 2022-11-14

## 2022-11-14 NOTE — TELEPHONE ENCOUNTER
Patient called with concerns about BP. Patient said she saw Alesha Favianns on 11/7 for a fall from fainting with rib pain. Patient says shes wobbly and has been checking per BP. 129/62 before eating. 117/68 this morning. Appt made for 11/21/2022 but would like advised on BPreadings and what to watch for.

## 2022-11-14 NOTE — TELEPHONE ENCOUNTER
Those blood pressures are actually good readings. She can continue to take her blood pressure 3 times a week and record them and bring to the appointment.

## 2022-11-28 ENCOUNTER — OFFICE VISIT (OUTPATIENT)
Dept: FAMILY MEDICINE CLINIC | Age: 52
End: 2022-11-28
Payer: COMMERCIAL

## 2022-11-28 ENCOUNTER — HOSPITAL ENCOUNTER (OUTPATIENT)
Age: 52
Discharge: HOME | End: 2022-11-28
Payer: COMMERCIAL

## 2022-11-28 VITALS
BODY MASS INDEX: 27.74 KG/M2 | HEIGHT: 68 IN | WEIGHT: 183 LBS | DIASTOLIC BLOOD PRESSURE: 84 MMHG | SYSTOLIC BLOOD PRESSURE: 136 MMHG | HEART RATE: 85 BPM | OXYGEN SATURATION: 98 %

## 2022-11-28 DIAGNOSIS — F41.9 ANXIETY AND DEPRESSION: ICD-10-CM

## 2022-11-28 DIAGNOSIS — G89.29 CHRONIC RIGHT HIP PAIN: ICD-10-CM

## 2022-11-28 DIAGNOSIS — M25.551 CHRONIC RIGHT HIP PAIN: ICD-10-CM

## 2022-11-28 DIAGNOSIS — M54.16 CHRONIC LUMBAR RADICULOPATHY: ICD-10-CM

## 2022-11-28 DIAGNOSIS — D64.9 NORMOCYTIC ANEMIA: ICD-10-CM

## 2022-11-28 DIAGNOSIS — R07.81 RIB PAIN ON LEFT SIDE: Primary | ICD-10-CM

## 2022-11-28 DIAGNOSIS — Z78.9 DIETING: ICD-10-CM

## 2022-11-28 DIAGNOSIS — F32.A ANXIETY AND DEPRESSION: ICD-10-CM

## 2022-11-28 LAB
ABSOLUTE EOS #: 0.2 K/UL (ref 0–0.4)
ABSOLUTE LYMPH #: 2 K/UL (ref 1–4.8)
ABSOLUTE MONO #: 0.7 K/UL (ref 0–1)
BASOPHILS # BLD: 1 % (ref 0–2)
BASOPHILS ABSOLUTE: 0 K/UL (ref 0–0.2)
DIFFERENTIAL TYPE: YES
EOSINOPHILS RELATIVE PERCENT: 2 % (ref 0–5)
HCT VFR BLD CALC: 35.9 % (ref 36–46)
HEMOGLOBIN: 11.8 G/DL (ref 12–16)
LYMPHOCYTES # BLD: 23 % (ref 15–40)
MCH RBC QN AUTO: 28.3 PG (ref 26–34)
MCHC RBC AUTO-ENTMCNC: 33 G/DL (ref 31–37)
MCV RBC AUTO: 85.7 FL (ref 80–100)
MONOCYTES # BLD: 8 % (ref 4–8)
PDW BLD-RTO: 14.5 % (ref 12.1–15.2)
PLATELET # BLD: 423 K/UL (ref 140–450)
RBC # BLD: 4.19 M/UL (ref 4–5.2)
SEG NEUTROPHILS: 66 % (ref 47–75)
SEGMENTED NEUTROPHILS ABSOLUTE COUNT: 5.6 K/UL (ref 2.5–7)
WBC # BLD: 8.4 K/UL (ref 3.5–11)

## 2022-11-28 PROCEDURE — 83540 ASSAY OF IRON: CPT

## 2022-11-28 PROCEDURE — 83550 IRON BINDING TEST: CPT

## 2022-11-28 PROCEDURE — 3074F SYST BP LT 130 MM HG: CPT | Performed by: FAMILY MEDICINE

## 2022-11-28 PROCEDURE — G8427 DOCREV CUR MEDS BY ELIG CLIN: HCPCS | Performed by: FAMILY MEDICINE

## 2022-11-28 PROCEDURE — 99214 OFFICE O/P EST MOD 30 MIN: CPT | Performed by: FAMILY MEDICINE

## 2022-11-28 PROCEDURE — 82746 ASSAY OF FOLIC ACID SERUM: CPT

## 2022-11-28 PROCEDURE — 3017F COLORECTAL CA SCREEN DOC REV: CPT | Performed by: FAMILY MEDICINE

## 2022-11-28 PROCEDURE — 82607 VITAMIN B-12: CPT

## 2022-11-28 PROCEDURE — 36415 COLL VENOUS BLD VENIPUNCTURE: CPT

## 2022-11-28 PROCEDURE — G8419 CALC BMI OUT NRM PARAM NOF/U: HCPCS | Performed by: FAMILY MEDICINE

## 2022-11-28 PROCEDURE — G8484 FLU IMMUNIZE NO ADMIN: HCPCS | Performed by: FAMILY MEDICINE

## 2022-11-28 PROCEDURE — 85025 COMPLETE CBC W/AUTO DIFF WBC: CPT

## 2022-11-28 PROCEDURE — 1036F TOBACCO NON-USER: CPT | Performed by: FAMILY MEDICINE

## 2022-11-28 PROCEDURE — 3078F DIAST BP <80 MM HG: CPT | Performed by: FAMILY MEDICINE

## 2022-11-28 NOTE — PROGRESS NOTES
Name: Tarun Escamilla  : 1970         Chief Complaint:     Chief Complaint   Patient presents with    [place]ella [place] at a [place] when she went to a concert, stopped eating for several days to try and loose weight, had 1 captain anthony and passed out striking a concrete pole with her ribs. Bp is normal now and ribs are feeling a lot better now. History of not eating and passing out, per patient. History of Present Illness:      Tarun Escamilla is a 50 y.o.  female who presents with Lindsay Lopez at a casino when she went to a concert, stopped eating for several days to try and loose weight, had 1 captain anthony and passed out striking a concrete pole with her ribs. Bp is normal now and ribs are feeling a lot better now. History of not eating and passing out, per patient. )      HPI    F/u from , was at [place] [employer]. Had been trying to lose weight and didn't eat for about 2 days. Knew she was going to pass out. Bearl Clam against a cement barrier, was wearing purse across her body and it got pulled tight against her and ultimately ripped. Had a lot of pain in ribs which in the past few days has started getting a lot better, able to wear underwire bras again. Pain in R hip has returned as well as pain in the knee. A lot of trouble with pain after work as well as difficulty lifting RLE to get into car. Chronic L foot drop also. Pt says R hip had gotten better when was losing weight (weight at that visit 184# which is 1 lb more than today). No longer using rowing machine like she had been. Has been doing home exercise for it but is limited d/t pain. Low hemoglobin on last labs. No symptoms of blood loss. Some easy bruising. Anxiety and depression doing okay. With regard to concerned about her weight.   Patient admits she had gotten self-conscious because she was interested in a hany who she thought she would start dating but he turned out not to be interested in her, which she thought could be related to her weight. She is now back together with her long time off and on boyfriend Gay Camilo and has made some stipulations for their relationship including that he cannot call her names as he had done in the past.  She continues meds as directed and feels dosing is appropriate. Medical History:     Patient Active Problem List   Diagnosis    Anxiety    Insomnia    HTN (hypertension)    Venous insufficiency    Overweight       Medications:       Prior to Admission medications    Medication Sig Start Date End Date Taking?  Authorizing Provider   diclofenac (VOLTAREN) 75 MG EC tablet Take 1 tablet by mouth 2 times daily 11/7/22  Yes Maryla Leyden, APRN - CNP   amphetamine-dextroamphetamine (ADDERALL XR) 15 MG extended release capsule TAKE 1 CAPSULE BY MOUTH DAILY 11/4/22 12/4/22 Yes Edin Barr DO   gabapentin (NEURONTIN) 300 MG capsule take 1 capsule by mouth every morning 1 capsule by mouth every AFTERNOON and 2 capsules by mouth every evening at bedtime 10/31/22 1/31/23 Yes Edin Barr DO   magnesium oxide (MAG-OX) 400 MG tablet Take 1 tablet by mouth at bedtime 8/30/22  Yes Edin Barr DO   buPROPion (WELLBUTRIN XL) 150 MG extended release tablet Take 1 tablet by mouth every morning 8/30/22  Yes Edin Barr DO   lamoTRIgine (LAMICTAL) 25 MG tablet take 1 tablet by mouth twice a day 8/30/22  Yes Edin Barr DO   citalopram (CELEXA) 40 MG tablet take 1 tablet by mouth once daily 8/10/22  Yes Edin Barr DO   naproxen (EC-NAPROSYN) 500 MG EC tablet Take 1 tablet by mouth 2 times daily (with meals) as needed for pain 8/10/22  Yes Edin Barr DO   pantoprazole (PROTONIX) 40 MG tablet take 1 tablet by mouth every morning before breakfast 7/7/22  Yes Edin Barr DO   clobetasol (TEMOVATE) 0.05 % cream apply to affected area twice a day for 30 DAYS then if needed 3/18/21  Yes Historical Provider, MD   estradiol (ESTRACE) 0.1 MG/GM vaginal cream apply ONE PEA SIZE DOLLOP ON EXTERNAL VAGINAL AREA AND SWIPE INTO. ..  (REFER TO PRESCRIPTION NOTES). 3/18/21  Yes Historical Provider, MD   Multiple Vitamins-Minerals (HAIR SKIN AND NAILS FORMULA PO) Take by mouth   Yes Historical Provider, MD   TURMERIC PO Take by mouth   Yes Historical Provider, MD   FOLIC ACID PO Take 9,181 mg by mouth daily    Yes Historical Provider, MD   saline nasal gel (AYR) GEL by Nasal route as needed for Congestion 5/31/19  Yes Derral Knee, DO   acetaminophen (TYLENOL) 325 MG tablet Take 650 mg by mouth every 6 hours as needed. Yes Historical Provider, MD        Allergies:       Vicodin [hydrocodone-acetaminophen]    Physical Exam:     Vitals:  /84   Pulse 85   Ht 5' 8\" (1.727 m)   Wt 183 lb (83 kg)   LMP 10/03/2017 (Exact Date)   SpO2 98%   BMI 27.83 kg/m²   Physical Exam  Vitals and nursing note reviewed. Constitutional:       General: She is not in acute distress. Appearance: She is well-developed. Pulmonary:      Effort: Pulmonary effort is normal.   Skin:     General: Skin is warm and dry. Neurological:      Mental Status: She is alert and oriented to person, place, and time.    Psychiatric:         Mood and Affect: Mood normal.         Behavior: Behavior normal.       Data:     Lab Results   Component Value Date/Time     10/11/2022 10:01 AM    K 3.8 10/11/2022 10:01 AM     10/11/2022 10:01 AM    CO2 27 10/11/2022 10:01 AM    BUN 6 10/11/2022 10:01 AM    CREATININE 0.88 10/11/2022 10:01 AM    GLUCOSE 92 10/11/2022 10:01 AM    PROT 6.9 10/11/2022 10:01 AM    LABALBU 4.2 10/11/2022 10:01 AM    BILITOT 0.5 10/11/2022 10:01 AM    ALKPHOS 95 10/11/2022 10:01 AM    AST 17 10/11/2022 10:01 AM    ALT 11 10/11/2022 10:01 AM     Lab Results   Component Value Date/Time    WBC 8.4 11/28/2022 03:29 PM    RBC 4.19 11/28/2022 03:29 PM    HGB 11.8 11/28/2022 03:29 PM    HCT 35.9 11/28/2022 03:29 PM    MCV 85.7 11/28/2022 03:29 PM    MCH 28.3 11/28/2022 03:29 PM    MCHC 33.0 11/28/2022 03:29 PM    RDW 14.5 11/28/2022 03:29 PM     11/28/2022 03:29 PM    MPV NOT REPORTED 03/08/2021 02:30 PM     Lab Results   Component Value Date/Time    TSH 1.28 03/08/2021 02:30 PM     Lab Results   Component Value Date/Time    CHOL 199 10/11/2022 10:01 AM    HDL 69 10/11/2022 10:01 AM         Assessment & Plan:        Diagnosis Orders   1. Rib pain on left side        2. Chronic lumbar radiculopathy  Mason Juares DO, Pain Management, [facility]      3. Chronic right hip pain        4. Dieting        5. Anxiety and depression        6. Normocytic anemia  CBC with Auto Differential    Iron and TIBC    Vitamin B12 & Folate        1. Rib pain resolved. Reviewed negative x-ray. Continue activity as tolerated.  2-3. Chronic pain bothering her more again. Advised patient I do not believe pain is strictly related to her weight. When I had last seen her she perceived that she was losing weight and that it was helping, but today she weighs 1 pound less than she had at that visit and pain is quite bothersome again. May be more so related to her activity levels or particular exercises, so I did encourage her to restart using rowing machine. Reviewed previous imaging. Referred back to pain management. 4.  Unhealthy dieting practices recently with patient fasting for multiple days. Advised this is not safe or effective as a method of weight loss and encouraged her instead to eat, drink, and exercise in a way that feels good to her body and energy levels. 5.  Anxiety and depression controlled, continue same Rx  6. Very mild anemia on last labs. Updating. She also mentioned that she had had a recent fit test, did not bring it back in time, so she plans to repeat that.         Requested Prescriptions      No prescriptions requested or ordered in this encounter         There are no Patient Instructions on file for this visit.      signed by Severino Burrell DO on 11/29/2022 at 8:00 AM  [address] [id number]  Dept: [phone]

## 2022-11-29 LAB
FOLATE: >20 NG/ML
IRON SATURATION: 13 % (ref 20–55)
IRON: 51 UG/DL (ref 37–145)
TOTAL IRON BINDING CAPACITY: 380 UG/DL (ref 250–450)
UNSATURATED IRON BINDING CAPACITY: 329 UG/DL (ref 112–347)
VITAMIN B-12: 370 PG/ML (ref 232–1245)

## 2022-12-05 DIAGNOSIS — Z12.12 ENCOUNTER FOR COLORECTAL CANCER SCREENING: ICD-10-CM

## 2022-12-05 DIAGNOSIS — Z12.11 ENCOUNTER FOR COLORECTAL CANCER SCREENING: ICD-10-CM

## 2022-12-05 DIAGNOSIS — D64.9 NORMOCYTIC ANEMIA: Primary | ICD-10-CM

## 2022-12-05 DIAGNOSIS — D64.9 NORMOCYTIC ANEMIA: ICD-10-CM

## 2022-12-05 LAB
HEMOCCULT STL QL: NORMAL

## 2022-12-05 PROCEDURE — 82270 OCCULT BLOOD FECES: CPT | Performed by: FAMILY MEDICINE

## 2022-12-06 DIAGNOSIS — G93.32 CHRONIC FATIGUE SYNDROME: ICD-10-CM

## 2022-12-06 RX ORDER — DEXTROAMPHETAMINE SACCHARATE, AMPHETAMINE ASPARTATE MONOHYDRATE, DEXTROAMPHETAMINE SULFATE AND AMPHETAMINE SULFATE 3.75; 3.75; 3.75; 3.75 MG/1; MG/1; MG/1; MG/1
CAPSULE, EXTENDED RELEASE ORAL
Qty: 30 CAPSULE | Refills: 0 | Status: SHIPPED | OUTPATIENT
Start: 2022-12-06 | End: 2023-01-05

## 2023-01-01 ENCOUNTER — APPOINTMENT (OUTPATIENT)
Dept: GENERAL RADIOLOGY | Age: 53
End: 2023-01-01
Payer: COMMERCIAL

## 2023-01-01 ENCOUNTER — HOSPITAL ENCOUNTER (EMERGENCY)
Age: 53
Discharge: HOME | End: 2023-01-01
Attending: FAMILY MEDICINE
Payer: COMMERCIAL

## 2023-01-01 VITALS
HEART RATE: 90 BPM | BODY MASS INDEX: 27.89 KG/M2 | TEMPERATURE: 98.3 F | DIASTOLIC BLOOD PRESSURE: 71 MMHG | RESPIRATION RATE: 18 BRPM | SYSTOLIC BLOOD PRESSURE: 140 MMHG | WEIGHT: 184 LBS | OXYGEN SATURATION: 98 % | HEIGHT: 68 IN

## 2023-01-01 DIAGNOSIS — S83.92XA SPRAIN OF LEFT KNEE, UNSPECIFIED LIGAMENT, INITIAL ENCOUNTER: Primary | ICD-10-CM
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
## 2023-01-01 PROCEDURE — 6360000002 HC RX W HCPCS: Performed by: FAMILY MEDICINE

## 2023-01-01 PROCEDURE — 6370000000 HC RX 637 (ALT 250 FOR IP): Performed by: FAMILY MEDICINE

## 2023-01-01 PROCEDURE — 99284 EMERGENCY DEPT VISIT MOD MDM: CPT

## 2023-01-01 PROCEDURE — 73590 X-RAY EXAM OF LOWER LEG: CPT

## 2023-01-01 RX ORDER — HYDROCODONE BITARTRATE AND ACETAMINOPHEN 5; 325 MG/1; MG/1
1 TABLET ORAL EVERY 6 HOURS PRN
Qty: 8 TABLET | Refills: 0 | Status: SHIPPED | OUTPATIENT
Start: 2023-01-01 | End: 2023-01-04

## 2023-01-01 RX ORDER — IBUPROFEN 800 MG/1
800 TABLET ORAL EVERY 8 HOURS PRN
Qty: 30 TABLET | Refills: 0 | Status: SHIPPED | OUTPATIENT
Start: 2023-01-01

## 2023-01-01 RX ORDER — KETOROLAC TROMETHAMINE 30 MG/ML
30 INJECTION, SOLUTION INTRAMUSCULAR; INTRAVENOUS ONCE
Status: COMPLETED | OUTPATIENT
Start: 2023-01-01 | End: 2023-01-01

## 2023-01-01 RX ADMIN — Medication 2 TABLET: at 06:13

## 2023-01-01 RX ADMIN — KETOROLAC TROMETHAMINE 30 MG: 30 INJECTION, SOLUTION INTRAMUSCULAR; INTRAVENOUS at 06:13

## 2023-01-01 ASSESSMENT — PAIN SCALES - GENERAL
PAINLEVEL_OUTOF10: 5
PAINLEVEL_OUTOF10: 10
PAINLEVEL_OUTOF10: 4

## 2023-01-01 ASSESSMENT — PAIN - FUNCTIONAL ASSESSMENT: PAIN_FUNCTIONAL_ASSESSMENT: 0-10

## 2023-01-01 ASSESSMENT — PAIN DESCRIPTION - LOCATION
LOCATION: LEG
LOCATION: LEG

## 2023-01-01 ASSESSMENT — PAIN DESCRIPTION - ORIENTATION
ORIENTATION: LEFT

## 2023-01-01 ASSESSMENT — PAIN DESCRIPTION - DESCRIPTORS: DESCRIPTORS: SHARP

## 2023-01-01 ASSESSMENT — PAIN DESCRIPTION - PAIN TYPE: TYPE: ACUTE PAIN

## 2023-01-01 NOTE — ED PROVIDER NOTES
[facility]  eMERGENCY dEPARTMENT eNCOUnter          [address]       Chief Complaint   Patient presents with    Leg Pain     Patient arrives to ER via [facility] with complaints of left leg pain that began after patient fell in a ditch tonight. Patient reports pain from the mid shin all the way up to the thigh. Pt reports pain of 5/10 with no movement. No obvious deformity noted. PT and DP pulses palpable. Nurses Notes reviewed and I agree except as noted in the HPI. HISTORY OF PRESENT ILLNESS    Darrell Latham is a 50 y.o. female who presents to the emergency room via EMS from local [facility], patient had fallen earlier in the evening into a ditch, required assistance from friends to get out, later realized the pain was too much indicating her left knee genital area, and called EMS to return to the emergency room for evaluation. Patient does admit she had some alcohol when she initially fell and cannot member the exact time, states pain has increased since that time. Patient denies any other injury outside of her left lower extremity. Patient does states she is been having prime with her left foot and her podiatrist wants her to have surgery. Patient rates her pain 5 out of 10 if she is not moving, however much worse with any movement. REVIEW OF SYSTEMS     Review of Systems   All other systems reviewed and are negative. PAST MEDICAL HISTORY    has a past medical history of Anxiety, Arthritis, Asthma, Depression, Osteoarthritis, Ulcer, and Wears glasses. SURGICAL HISTORY      has a past surgical history that includes  section; Upper gastrointestinal endoscopy (); Cholecystectomy, laparoscopic (N/A, 2017); [place] tooth extraction; Pain management procedure (Right, 2021); and Cholecystectomy. CURRENT MEDICATIONS       Previous Medications    ACETAMINOPHEN (TYLENOL) 325 MG TABLET    Take 650 mg by mouth every 6 hours as needed. AMPHETAMINE-DEXTROAMPHETAMINE (ADDERALL XR) 15 MG EXTENDED RELEASE CAPSULE    TAKE 1 CAPSULE BY MOUTH DAILY    BUPROPION (WELLBUTRIN XL) 150 MG EXTENDED RELEASE TABLET    Take 1 tablet by mouth every morning    CITALOPRAM (CELEXA) 40 MG TABLET    take 1 tablet by mouth once daily    CLOBETASOL (TEMOVATE) 0.05 % CREAM    apply to affected area twice a day for 30 DAYS then if needed    DICLOFENAC (VOLTAREN) 75 MG EC TABLET    Take 1 tablet by mouth 2 times daily    ESTRADIOL (ESTRACE) 0.1 MG/GM VAGINAL CREAM    apply ONE PEA SIZE DOLLOP ON EXTERNAL VAGINAL AREA AND SWIPE INTO. ..  (REFER TO PRESCRIPTION NOTES). FOLIC ACID PO    Take 5,499 mg by mouth daily     GABAPENTIN (NEURONTIN) 300 MG CAPSULE    take 1 capsule by mouth every morning 1 capsule by mouth every AFTERNOON and 2 capsules by mouth every evening at bedtime    LAMOTRIGINE (LAMICTAL) 25 MG TABLET    take 1 tablet by mouth twice a day    MAGNESIUM OXIDE (MAG-OX) 400 MG TABLET    Take 1 tablet by mouth at bedtime    MULTIPLE VITAMINS-MINERALS (HAIR SKIN AND NAILS FORMULA PO)    Take by mouth    NAPROXEN (EC-NAPROSYN) 500 MG EC TABLET    Take 1 tablet by mouth 2 times daily (with meals) as needed for pain    PANTOPRAZOLE (PROTONIX) 40 MG TABLET    take 1 tablet by mouth every morning before breakfast    SALINE NASAL GEL (AYR) GEL    by Nasal route as needed for Congestion    TURMERIC PO    Take by mouth       ALLERGIES     is allergic to vicodin [hydrocodone-acetaminophen]. FAMILY HISTORY     She indicated that her mother is . She indicated that her father is . She indicated that both of her sisters are alive. She indicated that all of her four brothers are alive. She indicated that her maternal grandmother is . She indicated that her maternal grandfather is . She indicated that her paternal grandmother is . She indicated that her paternal grandfather is . She indicated that her son is alive.    family history includes Cancer in her mother; Diabetes in her mother; Heart Disease in her father. SOCIAL HISTORY      reports that she quit smoking about 21 years ago. Her smoking use included cigarettes. She has a 6.00 pack-year smoking history. She has never used smokeless tobacco. She reports that she does not drink alcohol and does not use drugs. PHYSICAL EXAM     INITIAL VITALS:  height is 5' 8\" (1.727 m) and weight is 184 lb (83.5 kg). Her oral temperature is 98.3 °F (36.8 °C). Her blood pressure is 140/71 (abnormal) and her pulse is 90. Her respiration is 18 and oxygen saturation is 98%. Physical Exam   Constitutional: Patient is oriented to person, place, and time. Patient appears well-developed and well-nourished. Patient is active and cooperative. Neck: Full passive range of motion without pain and phonation normal.   Cardiovascular:  Normal rate, regular rhythm and intact distal pulses. Pulses: Left DP pulse  2+ and marked  Pulmonary/Chest: Effort normal. No tachypnea and no bradypnea. Abdominal: BMI 27.9, soft. Patient without distension   Musculoskeletal:   Focused examination patient's left lower extremity, left knee region, shows some swelling greater in the medial aspect of the knee versus the lateral aspect, patellar appears midline and mostly intact, there is no overlying tegmen aberration. There is any marked pain to palpation greatest over the medial aspect of the needle throughout the entire knee structure extending into the superior tibia and inferior femur, worse with any form of movement, distal CSM intact. Except mood otherwise noted, negative acute trauma or deformity,  apparent full range of motion and normal strength all extremities appropriate to age. Neurological: Patient is alert and oriented to person, place, and time. patient displays no tremor. Patient displays no seizure activity. Skin: Skin is warm and dry. Patient is not diaphoretic.   Psychiatric: Patient has a normal mood and affect. Patient speech is normal and behavior is normal. Cognition and memory are normal.     DIFFERENTIAL DIAGNOSIS:   Fracture dislocation sprain strain contusion    DIAGNOSTIC RESULTS           RADIOLOGY: non-plain film images(s) such as CT, Ultrasound and MRI are read by the radiologist.  XR TIBIA FIBULA LEFT (2 VIEWS)    (Results Pending)       LABS:   Labs Reviewed - No data to display    MIPS    Not applicable    MEDICAL DECISION MAKING   Vitals:    Vitals:    01/01/23 0535 01/01/23 0540   BP:  (!) 140/71   Pulse: 90    Resp: 18    Temp: 98.3 °F (36.8 °C)    TempSrc: Oral    SpO2: 98%    Weight: 184 lb (83.5 kg)    Height: 5' 8\" (1.727 m)      OARRS = 250    Patient brought to ER by EMS and, transferred to bed in room 2, initial primary and secondary survey performed, shortness placed including x-rays of the left knee and tib-fib. Patient is declining pain medication at this time. Imaging reviewed, no gross fracture, suspect based on patient's mechanism of injury and exam findings, patient having sprain versus strain of the left knee, primarily affecting the medial ligament. Discussed with patient please irrigated Velcro splint, nonweightbearing crutches, we discussed appropriate pain control, will give patient some ketorolac prior to placing knee immobilizer, Motrin Tylenol at home for baseline pain, Norco for any breakthrough pain, outpatient follow-up with orthopedic surgery Dr. Prakash Perez, patient acknowledges    FINAL IMPRESSION      1. Sprain of left knee, unspecified ligament, initial encounter    2.  Accidental fall, initial encounter          DISPOSITION/PLAN   Discharge    PATIENT REFERRED TO:  Bree Alarcon MD  [address] [place] [id number]  [phone]    Call       Danielle Almaraz, DO  [address] [address] [phone]      As needed    HOSP [facility]  [address] [address] [phone]    As needed      DISCHARGE MEDICATIONS:  New Prescriptions    HYDROCODONE-ACETAMINOPHEN (NORCO) 5-325 MG PER TABLET    Take 1 tablet by mouth every 6 hours as needed for Pain for up to 3 days. Intended supply: 3 days. Take lowest dose possible to manage pain Max Daily Amount: 4 tablets    IBUPROFEN (ADVIL;MOTRIN) 800 MG TABLET    Take 1 tablet by mouth every 8 hours as needed for Pain           Summation          ED Medications administered this visit:    Medications   ketorolac (TORADOL) injection 30 mg (has no administration in time range)   hydrocodone-acetaminophen (NORCO) tablet 5-325 mg (STARTER PACK) (has no administration in time range)       New Prescriptions from this visit:    New Prescriptions    HYDROCODONE-ACETAMINOPHEN (NORCO) 5-325 MG PER TABLET    Take 1 tablet by mouth every 6 hours as needed for Pain for up to 3 days. Intended supply: 3 days. Take lowest dose possible to manage pain Max Daily Amount: 4 tablets    IBUPROFEN (ADVIL;MOTRIN) 800 MG TABLET    Take 1 tablet by mouth every 8 hours as needed for Pain       Follow-up:  Michael Martinez MD  [address] [place] [address]  [phone]    Call       Zeke Roberson DO  [address] [address] [phone]      As needed    [facility]  [address] [address] [phone]    As needed        Final Impression:   1. Sprain of left knee, unspecified ligament, initial encounter    2.  Accidental fall, initial encounter               (Please note that portions of this note were completed with a voice recognition program.  Efforts were made to edit the dictations but occasionally words are mis-transcribed.)    MD Gale Alatorre MD  01/01/23 2335

## 2023-01-02 RX ORDER — PANTOPRAZOLE SODIUM 40 MG/1
TABLET, DELAYED RELEASE ORAL
Qty: 30 TABLET | Refills: 5 | Status: SHIPPED | OUTPATIENT
Start: 2023-01-02

## 2023-01-03 ENCOUNTER — TELEPHONE (OUTPATIENT)
Dept: FAMILY MEDICINE CLINIC | Age: 53
End: 2023-01-03

## 2023-01-03 NOTE — TELEPHONE ENCOUNTER
Patient fell in the ditch on 01/01 - hurt knee - asking for a work excuse for light duty - please let patient know - is to call Odette Carpenter today for an appointment per ER suggestion    Health Maintenance   Topic Date Due    Cervical cancer screen  Never done    Colorectal Cancer Screen  Never done    COVID-19 Vaccine (2 - Pfizer series) 10/13/2021    Flu vaccine (1) Never done    Shingles vaccine (1 of 2) Never done    Depression Monitoring  08/30/2023    Breast cancer screen  06/20/2024    Lipids  10/11/2027    DTaP/Tdap/Td vaccine (2 - Td or Tdap) 11/29/2028    Hepatitis C screen  Completed    HIV screen  Completed    Hepatitis A vaccine  Aged Out    Hib vaccine  Aged Out    Meningococcal (ACWY) vaccine  Aged Out    Pneumococcal 0-64 years Vaccine  Aged Out             (applicable per patient's age: Cancer Screenings, Depression Screening, Fall Risk Screening, Immunizations)    LDL Cholesterol (mg/dL)   Date Value   10/11/2022 116     AST (U/L)   Date Value   10/11/2022 17     ALT (U/L)   Date Value   10/11/2022 11     BUN (mg/dL)   Date Value   10/11/2022 6      (goal A1C is < 7)   (goal LDL is <100) need 30-50% reduction from baseline     BP Readings from Last 3 Encounters:   01/01/23 (!) 140/71   11/28/22 136/84   11/07/22 136/82    (goal /80)      All Future Testing planned in [facility]:  Lab Frequency Next Occurrence       Next Visit Date:  Future Appointments   Date Time Provider Reginaldo Echavarria   1/12/2023  9:45 AM Mason Marquez DO [place] [employer]            Patient Active Problem List:     Anxiety     Insomnia     HTN (hypertension)     Venous insufficiency     Overweight

## 2023-01-03 NOTE — LETTER
[facility] PRIMARY CARE LEONELA Burch [address] [place] [address]  Phone: [phone]  Fax: Kayce 289, DO        January 3, 2023     Patient: Rafat Ward   YOB: 1970   Date of Visit: 1/3/2023       To Whom It May Concern: It is my medical opinion that Ana Paula Less should only be given light duty assignments from 1/3/23 - 1/9/23. If you have any questions or concerns, please don't hesitate to call.     Sincerely,          Zohreh Galvez DO

## 2023-01-03 NOTE — Clinical Note
[facility] PRIMARY CARE LEONELA Feliciano[address] [address]  Phone: [phone]  Fax: Kayce Judge, DO        January 3, 2023     Patient: Yuval Matson   YOB: 1970   Date of Visit: 1/3/2023       To Whom It May Concern: It is my medical opinion that Evelio Tripathi {participation release, (activity restriction):19405}. If you have any questions or concerns, please don't hesitate to call.     Sincerely,        Ирина Martinez DO

## 2023-01-11 DIAGNOSIS — G93.32 CHRONIC FATIGUE SYNDROME: ICD-10-CM

## 2023-01-11 RX ORDER — DEXTROAMPHETAMINE SACCHARATE, AMPHETAMINE ASPARTATE MONOHYDRATE, DEXTROAMPHETAMINE SULFATE AND AMPHETAMINE SULFATE 3.75; 3.75; 3.75; 3.75 MG/1; MG/1; MG/1; MG/1
CAPSULE, EXTENDED RELEASE ORAL
Qty: 30 CAPSULE | Refills: 0 | Status: SHIPPED | OUTPATIENT
Start: 2023-01-11 | End: 2023-02-10

## 2023-01-11 NOTE — TELEPHONE ENCOUNTER
Last visit:  11/28/2022  Next Visit Date:    Future Appointments   Date Time Provider Reginaldo Echavarria   1/12/2023  9:45 AM Mason Landaverde, DO [facility]   1/12/2023 10:00 AM DO Lucy Guankaidenbonnie [facility]         Medication List:  Prior to Admission medications    Medication Sig Start Date End Date Taking?  Authorizing Provider   pantoprazole (PROTONIX) 40 MG tablet take 1 tablet by mouth every morning before breakfast 1/2/23   Marcella Post,    ibuprofen (ADVIL;MOTRIN) 800 MG tablet Take 1 tablet by mouth every 8 hours as needed for Pain 1/1/23   Melina Stapleton MD   amphetamine-dextroamphetamine (ADDERALL XR) 15 MG extended release capsule TAKE 1 CAPSULE BY MOUTH DAILY 12/6/22 1/5/23  Marcella Post, DO   diclofenac (VOLTAREN) 75 MG EC tablet Take 1 tablet by mouth 2 times daily  Patient not taking: Reported on 1/1/2023 11/7/22   DEANDRA Britton - CNP   gabapentin (NEURONTIN) 300 MG capsule take 1 capsule by mouth every morning 1 capsule by mouth every AFTERNOON and 2 capsules by mouth every evening at bedtime 10/31/22 1/31/23  Marcella Post,    magnesium oxide (MAG-OX) 400 MG tablet Take 1 tablet by mouth at bedtime 8/30/22   Marcella Post, DO   buPROPion (WELLBUTRIN XL) 150 MG extended release tablet Take 1 tablet by mouth every morning 8/30/22   Marcella Post, DO   lamoTRIgine (LAMICTAL) 25 MG tablet take 1 tablet by mouth twice a day 8/30/22   Marcella Post, DO   citalopram (CELEXA) 40 MG tablet take 1 tablet by mouth once daily 8/10/22   Marcella Post, DO   naproxen (EC-NAPROSYN) 500 MG EC tablet Take 1 tablet by mouth 2 times daily (with meals) as needed for pain  Patient not taking: Reported on 1/1/2023 8/10/22   Marcella Post DO   clobetasol (TEMOVATE) 0.05 % cream apply to affected area twice a day for 30 DAYS then if needed 3/18/21   Historical Provider, MD   estradiol (ESTRACE) 0.1 MG/GM vaginal cream apply ONE PEA SIZE DOLLOP ON EXTERNAL VAGINAL AREA AND SWIPE INTO. ..  (REFER TO PRESCRIPTION NOTES). 3/18/21   Historical Provider, MD   Multiple Vitamins-Minerals (HAIR SKIN AND NAILS FORMULA PO) Take by mouth    Historical Provider, MD   TURMERIC PO Take by mouth    Historical Provider, MD   FOLIC ACID PO Take 4,817 mg by mouth daily     Historical Provider, MD   saline nasal gel (AYR) GEL by Nasal route as needed for Congestion 5/31/19   Juliane Irene DO   acetaminophen (TYLENOL) 325 MG tablet Take 650 mg by mouth every 6 hours as needed.       Historical Provider, MD

## 2023-01-11 NOTE — TELEPHONE ENCOUNTER
Patient asking for a new script for Adderall - patient uses [facility] Maintenance   Topic Date Due    Cervical cancer screen  Never done    Colorectal Cancer Screen  Never done    COVID-19 Vaccine (2 - Pfizer series) 10/13/2021    Flu vaccine (1) Never done    Shingles vaccine (1 of 2) Never done    Depression Monitoring  08/30/2023    Breast cancer screen  06/20/2024    Lipids  10/11/2027    DTaP/Tdap/Td vaccine (2 - Td or Tdap) 11/29/2028    Hepatitis C screen  Completed    HIV screen  Completed    Hepatitis A vaccine  Aged Out    Hib vaccine  Aged Out    Meningococcal (ACWY) vaccine  Aged Out    Pneumococcal 0-64 years Vaccine  Aged Out             (applicable per patient's age: Cancer Screenings, Depression Screening, Fall Risk Screening, Immunizations)    LDL Cholesterol (mg/dL)   Date Value   10/11/2022 116     AST (U/L)   Date Value   10/11/2022 17     ALT (U/L)   Date Value   10/11/2022 11     BUN (mg/dL)   Date Value   10/11/2022 6      (goal A1C is < 7)   (goal LDL is <100) need 30-50% reduction from baseline     BP Readings from Last 3 Encounters:   01/01/23 (!) 140/71   11/28/22 136/84   11/07/22 136/82    (goal /80)      All Future Testing planned in [facility]:  Lab Frequency Next Occurrence       Next Visit Date:  Future Appointments   Date Time Provider Reginaldo Echavarria   1[facility]12[facility]2023  9[facility]   1/12/2023 10:00 AM DO Chris Melgoza [place] [facility]            Patient Active Problem List:     Anxiety     Insomnia     HTN (hypertension)     Venous insufficiency     Overweight

## 2023-01-12 ENCOUNTER — HOSPITAL ENCOUNTER (OUTPATIENT)
Dept: MRI IMAGING | Age: 53
Discharge: HOME | End: 2023-01-14
Payer: COMMERCIAL

## 2023-01-12 ENCOUNTER — OFFICE VISIT (OUTPATIENT)
Dept: FAMILY MEDICINE CLINIC | Age: 53
End: 2023-01-12
Payer: COMMERCIAL

## 2023-01-12 VITALS — OXYGEN SATURATION: 99 % | DIASTOLIC BLOOD PRESSURE: 74 MMHG | SYSTOLIC BLOOD PRESSURE: 136 MMHG | HEART RATE: 78 BPM

## 2023-01-12 DIAGNOSIS — M25.562 ACUTE PAIN OF LEFT KNEE: ICD-10-CM

## 2023-01-12 DIAGNOSIS — M21.372 LEFT FOOT DROP: ICD-10-CM

## 2023-01-12 DIAGNOSIS — S82.122D CLOSED FRACTURE OF LATERAL PORTION OF LEFT TIBIAL PLATEAU WITH ROUTINE HEALING, SUBSEQUENT ENCOUNTER: Primary | ICD-10-CM

## 2023-01-12 PROCEDURE — 3017F COLORECTAL CA SCREEN DOC REV: CPT | Performed by: FAMILY MEDICINE

## 2023-01-12 PROCEDURE — 99213 OFFICE O/P EST LOW 20 MIN: CPT | Performed by: FAMILY MEDICINE

## 2023-01-12 PROCEDURE — 3078F DIAST BP <80 MM HG: CPT | Performed by: FAMILY MEDICINE

## 2023-01-12 PROCEDURE — 73721 MRI JNT OF LWR EXTRE W/O DYE: CPT

## 2023-01-12 PROCEDURE — 1036F TOBACCO NON-USER: CPT | Performed by: FAMILY MEDICINE

## 2023-01-12 PROCEDURE — 3074F SYST BP LT 130 MM HG: CPT | Performed by: FAMILY MEDICINE

## 2023-01-12 PROCEDURE — G8484 FLU IMMUNIZE NO ADMIN: HCPCS | Performed by: FAMILY MEDICINE

## 2023-01-12 PROCEDURE — G8427 DOCREV CUR MEDS BY ELIG CLIN: HCPCS | Performed by: FAMILY MEDICINE

## 2023-01-12 PROCEDURE — G8419 CALC BMI OUT NRM PARAM NOF/U: HCPCS | Performed by: FAMILY MEDICINE

## 2023-01-12 RX ORDER — HYDROCODONE BITARTRATE AND ACETAMINOPHEN 5; 325 MG/1; MG/1
TABLET ORAL
COMMUNITY
Start: 2023-01-06

## 2023-01-12 ASSESSMENT — PATIENT HEALTH QUESTIONNAIRE - PHQ9
6. FEELING BAD ABOUT YOURSELF - OR THAT YOU ARE A FAILURE OR HAVE LET YOURSELF OR YOUR FAMILY DOWN: 0
SUM OF ALL RESPONSES TO PHQ QUESTIONS 1-9: 0
4. FEELING TIRED OR HAVING LITTLE ENERGY: 0
8. MOVING OR SPEAKING SO SLOWLY THAT OTHER PEOPLE COULD HAVE NOTICED. OR THE OPPOSITE, BEING SO FIGETY OR RESTLESS THAT YOU HAVE BEEN MOVING AROUND A LOT MORE THAN USUAL: 0
10. IF YOU CHECKED OFF ANY PROBLEMS, HOW DIFFICULT HAVE THESE PROBLEMS MADE IT FOR YOU TO DO YOUR WORK, TAKE CARE OF THINGS AT HOME, OR GET ALONG WITH OTHER PEOPLE: 0
5. POOR APPETITE OR OVEREATING: 0
7. TROUBLE CONCENTRATING ON THINGS, SUCH AS READING THE NEWSPAPER OR WATCHING TELEVISION: 0
SUM OF ALL RESPONSES TO PHQ QUESTIONS 1-9: 0
SUM OF ALL RESPONSES TO PHQ9 QUESTIONS 1 & 2: 0
3. TROUBLE FALLING OR STAYING ASLEEP: 0
2. FEELING DOWN, DEPRESSED OR HOPELESS: 0
SUM OF ALL RESPONSES TO PHQ QUESTIONS 1-9: 0
SUM OF ALL RESPONSES TO PHQ QUESTIONS 1-9: 0
1. LITTLE INTEREST OR PLEASURE IN DOING THINGS: 0
9. THOUGHTS THAT YOU WOULD BE BETTER OFF DEAD, OR OF HURTING YOURSELF: 0

## 2023-01-12 NOTE — PROGRESS NOTES
Name: Eric Traylor  : 1970         Chief Complaint:     Chief Complaint   Patient presents with    Knee Pain     Left knee pain, worsening , 11 days since injury. Using knee brace and using a crutch. Drank a little too much tequila on New Years and stumbled into a culvert and fell into a ditch. Seen in the ER, dx with sprain. Dr. Armando Jacome ordered and MRI of left knee. History of Present Illness:      Eric Traylor is a 50 y.o.  female who presents with Knee Pain (Left knee pain, worsening , 11 days since injury. Using knee brace and using a crutch. Drank a little too much tequila on New Years and stumbled into a culvert and fell into a ditch. Seen in the ER, dx with sprain. Dr. Armando Jacome ordered and MRI of left knee. )      HPI    Pt c/o severe L knee pain since new years len, sustained injury falling into a ditch, indicates forceful valgus positioning. Worst pain is in prox tibia. Has been doing seated job at work and finds she really can't do that, needs to be in recliner or bed. Taking norco every 5 hrs and also using ibuprofen and her chronic gabapentin. Off work now til Feb 3rd. Saw ortho and had MRI ordered, not yet performed. Does have chronic L foot drop, has been using L ankle brace, wasn't wearing it the night of injury. Had also had 3 shots of tequila which is highly unusual for her, very rarely drinks. Did not lose consciousness or have any other injury when she fell. Medical History:     Patient Active Problem List   Diagnosis    Anxiety    Insomnia    HTN (hypertension)    Venous insufficiency    Overweight       Medications:       Prior to Admission medications    Medication Sig Start Date End Date Taking?  Authorizing Provider   HYDROcodone-acetaminophen (NORCO) 5-325 MG per tablet take 1 tablet by mouth every 6 hours for 7 days 23   Historical Provider, MD   amphetamine-dextroamphetamine (ADDERALL XR) 15 MG extended release capsule TAKE 1 CAPSULE BY MOUTH DAILY 23 2/10/23  Harlan Pleitez DO   pantoprazole (PROTONIX) 40 MG tablet take 1 tablet by mouth every morning before breakfast 1/2/23   Harlan Pleitez DO   ibuprofen (ADVIL;MOTRIN) 800 MG tablet Take 1 tablet by mouth every 8 hours as needed for Pain 1/1/23   Amber Martin MD   gabapentin (NEURONTIN) 300 MG capsule take 1 capsule by mouth every morning 1 capsule by mouth every AFTERNOON and 2 capsules by mouth every evening at bedtime 10/31/22 1/31/23  Harlan Pleitez DO   magnesium oxide (MAG-OX) 400 MG tablet Take 1 tablet by mouth at bedtime 8/30/22   Harlan Pleitez, DO   buPROPion (WELLBUTRIN XL) 150 MG extended release tablet Take 1 tablet by mouth every morning 8/30/22   Harlan Pleitez,    lamoTRIgine (LAMICTAL) 25 MG tablet take 1 tablet by mouth twice a day 8/30/22   Harlan Pleitez,    citalopram (CELEXA) 40 MG tablet take 1 tablet by mouth once daily 8/10/22   Harlan Pleitez DO   clobetasol (TEMOVATE) 0.05 % cream apply to affected area twice a day for 30 DAYS then if needed 3/18/21   Historical Provider, MD   estradiol (ESTRACE) 0.1 MG/GM vaginal cream apply ONE PEA SIZE DOLLOP ON EXTERNAL VAGINAL AREA AND SWIPE INTO. ..  (REFER TO PRESCRIPTION NOTES). 3/18/21   Historical Provider, MD   Multiple Vitamins-Minerals (HAIR SKIN AND NAILS FORMULA PO) Take by mouth    Historical Provider, MD   TURMERIC PO Take by mouth    Historical Provider, MD   FOLIC ACID PO Take 4,984 mg by mouth daily     Historical Provider, MD   saline nasal gel (AYR) GEL by Nasal route as needed for Congestion 5/31/19   Harlan Pleitez DO   acetaminophen (TYLENOL) 325 MG tablet Take 650 mg by mouth every 6 hours as needed. Historical Provider, MD        Allergies:       Patient has no known allergies. Physical Exam:     Vitals:  /74   Pulse 78   LMP 10/03/2017 (Exact Date)   SpO2 99%   Physical Exam  Vitals and nursing note reviewed. Constitutional:       General: She is not in acute distress. Appearance: She is well-developed. Pulmonary:      Effort: Pulmonary effort is normal.   Musculoskeletal:      Comments: L knee velcro brace present. L foot leather stiff brace. No pitting edema L distal leg. Skin:     General: Skin is warm and dry. Neurological:      Mental Status: She is alert and oriented to person, place, and time. Psychiatric:         Mood and Affect: Mood normal.         Behavior: Behavior normal.       Data:     Lab Results   Component Value Date/Time     10/11/2022 10:01 AM    K 3.8 10/11/2022 10:01 AM     10/11/2022 10:01 AM    CO2 27 10/11/2022 10:01 AM    BUN 6 10/11/2022 10:01 AM    CREATININE 0.88 10/11/2022 10:01 AM    GLUCOSE 92 10/11/2022 10:01 AM    PROT 6.9 10/11/2022 10:01 AM    LABALBU 4.2 10/11/2022 10:01 AM    BILITOT 0.5 10/11/2022 10:01 AM    ALKPHOS 95 10/11/2022 10:01 AM    AST 17 10/11/2022 10:01 AM    ALT 11 10/11/2022 10:01 AM     Lab Results   Component Value Date/Time    WBC 8.4 11/28/2022 03:29 PM    RBC 4.19 11/28/2022 03:29 PM    HGB 11.8 11/28/2022 03:29 PM    HCT 35.9 11/28/2022 03:29 PM    MCV 85.7 11/28/2022 03:29 PM    MCH 28.3 11/28/2022 03:29 PM    MCHC 33.0 11/28/2022 03:29 PM    RDW 14.5 11/28/2022 03:29 PM     11/28/2022 03:29 PM    MPV NOT REPORTED 03/08/2021 02:30 PM     Lab Results   Component Value Date/Time    TSH 1.28 03/08/2021 02:30 PM     Lab Results   Component Value Date/Time    CHOL 199 10/11/2022 10:01 AM    HDL 69 10/11/2022 10:01 AM     7/29/19 lumbar MRI  1. Degenerative grade 1 anterolisthesis of L5 on S1 measures 3 mm with moderate    to severe facet arthropathy more pronounced on the left. There is a 3 mm    broad-based disc protrusion with changes resulting in mild left foraminal    narrowing without central stenosis. 2. A broad-based disc protrusion and facet arthropathy is present at L4-L5    resulting in mild left foraminal narrowing and lateral recess narrowing on the    left.  There is near abutment of the left L5 nerve root. 3. Additional discogenic change and facet arthropathy otherwise as described    above. 4. There is mild curvature of the lumbar spine convex to the left. No acute    fracture. L tib-fib xr 1/1/23  1. Generalized osteopenia. 2. Mild osteoarthritis with bicompartmental involvement noted. 3. On AP imaging there is concern for possible depressed fracture deformity    involving the lateral tibial plateau. This is more difficult to visualize on    the lateral images. Recommend further evaluation with dedicated cross-sectional    imaging. A joint effusion is suspected. 4. Small superficial subcutaneous varices about the medial aspect of the left    lower extremity noted. Subcentimeter dystrophic calcification anterior to the    proximal tibia is noted. Assessment & Plan:        Diagnosis Orders   1. Closed fracture of lateral portion of left tibial plateau with routine healing, subsequent encounter        2. Left foot drop          Xr suggestive of L tibial plateau fx. Severe pain. Cont norco (removed from allergy list as she is tolerating it) and ibuprofen. Advised as pain improves that she change to tylenol and ibuprofen instead. Having MRI on L knee immediately after visit. Pt rarely drinks alcohol but I did advise alcohol could cause more drowsiness than would be expected d/t gabapentin use. L foot drop which has been chronic, had lumbar MRI findings as above, saw neurosurgeon who recommended no intervention. Does have stiff brace now.       Requested Prescriptions      No prescriptions requested or ordered in this encounter         There are no Patient Instructions on file for this visit.      signed by Riri Ortega DO on 1/15/2023 at 1:16 PM  [address] [address]  Dept: [phone]

## 2023-01-18 ENCOUNTER — TELEPHONE (OUTPATIENT)
Dept: FAMILY MEDICINE CLINIC | Age: 53
End: 2023-01-18

## 2023-01-18 NOTE — TELEPHONE ENCOUNTER
Patient asking for a RTW letter - states that she is walking fine - thinks she may have just pulled something - she is not using her pain medication - please fax to [phone]    Health Maintenance   Topic Date Due    Cervical cancer screen  Never done    Colorectal Cancer Screen  Never done    COVID-19 Vaccine (2 - Pfizer series) 10/13/2021    Flu vaccine (1) Never done    Shingles vaccine (1 of 2) Never done    Depression Monitoring  01/12/2024    Breast cancer screen  06/20/2024    Lipids  10/11/2027    DTaP/Tdap/Td vaccine (2 - Td or Tdap) 11/29/2028    Hepatitis C screen  Completed    HIV screen  Completed    Hepatitis A vaccine  Aged Out    Hib vaccine  Aged Out    Meningococcal (ACWY) vaccine  Aged Out    Pneumococcal 0-64 years Vaccine  Aged Out             (applicable per patient's age: Cancer Screenings, Depression Screening, Fall Risk Screening, Immunizations)    LDL Cholesterol (mg/dL)   Date Value   10/11/2022 116     AST (U/L)   Date Value   10/11/2022 17     ALT (U/L)   Date Value   10/11/2022 11     BUN (mg/dL)   Date Value   10/11/2022 6      (goal A1C is < 7)   (goal LDL is <100) need 30-50% reduction from baseline     BP Readings from Last 3 Encounters:   01/12/23 136/74   01/01/23 (!) 140/71   11/28/22 136/84    (goal /80)      All Future Testing planned in [facility]:  Lab Frequency Next Occurrence   MRI KNEE LEFT WO CONTRAST Once 01/13/2023       Next Visit Date:  No future appointments.          Patient Active Problem List:     Anxiety     Insomnia     HTN (hypertension)     Venous insufficiency     Overweight

## 2023-01-18 NOTE — TELEPHONE ENCOUNTER
Spoke with patient. She stated Ortho has her on restrictions until Feb 3rd. Restrictions were not specified according to patient. Patient will notify the office with an update when she see's ortho on Friday.

## 2023-01-18 NOTE — TELEPHONE ENCOUNTER
Needs to find out activity restrictions from Ortho. Has she seen them or when is her next visit? Did they ever give her the MRI result? I saw it and there were definitely significant abnormalities so I want their recommendation.

## 2023-01-23 ENCOUNTER — HOSPITAL ENCOUNTER (OUTPATIENT)
Dept: GENERAL RADIOLOGY | Age: 53
Discharge: HOME | End: 2023-01-25
Payer: COMMERCIAL

## 2023-01-23 ENCOUNTER — HOSPITAL ENCOUNTER (OUTPATIENT)
Age: 53
Discharge: HOME | End: 2023-01-25
Payer: COMMERCIAL

## 2023-01-23 DIAGNOSIS — M76.822 POSTERIOR TIBIAL TENDON DYSFUNCTION (PTTD) OF LEFT LOWER EXTREMITY: Primary | ICD-10-CM

## 2023-01-23 DIAGNOSIS — M79.672 LEFT FOOT PAIN: ICD-10-CM

## 2023-01-23 PROCEDURE — 73630 X-RAY EXAM OF FOOT: CPT

## 2023-01-25 DIAGNOSIS — F41.9 ANXIETY: ICD-10-CM

## 2023-01-25 RX ORDER — ALPRAZOLAM 0.5 MG/1
0.5 TABLET ORAL 3 TIMES DAILY PRN
Qty: 60 TABLET | Refills: 0 | Status: SHIPPED | OUTPATIENT
Start: 2023-01-25 | End: 2023-02-24

## 2023-01-25 NOTE — TELEPHONE ENCOUNTER
Xanax 0.5 ,g    [facility] Maintenance   Topic Date Due    Cervical cancer screen  Never done    Colorectal Cancer Screen  Never done    COVID-19 Vaccine (2 - Pfizer series) 10/13/2021    Flu vaccine (1) Never done    Shingles vaccine (1 of 2) Never done    Depression Monitoring  01/12/2024    Breast cancer screen  06/20/2024    Lipids  10/11/2027    DTaP/Tdap/Td vaccine (2 - Td or Tdap) 11/29/2028    Hepatitis C screen  Completed    HIV screen  Completed    Hepatitis A vaccine  Aged Out    Hib vaccine  Aged Out    Meningococcal (ACWY) vaccine  Aged Out    Pneumococcal 0-64 years Vaccine  Aged Out             (applicable per patient's age: Cancer Screenings, Depression Screening, Fall Risk Screening, Immunizations)    LDL Cholesterol (mg/dL)   Date Value   10/11/2022 116     AST (U/L)   Date Value   10/11/2022 17     ALT (U/L)   Date Value   10/11/2022 11     BUN (mg/dL)   Date Value   10/11/2022 6      (goal A1C is < 7)   (goal LDL is <100) need 30-50% reduction from baseline     BP Readings from Last 3 Encounters:   01/12/23 136/74   01/01/23 (!) 140/71   11/28/22 136/84    (goal /80)      All Future Testing planned in [facility]:  Lab Frequency Next Occurrence   MRI KNEE LEFT WO CONTRAST Once 01/13/2023   MRI ANKLE LEFT WO CONTRAST Once 01/23/2023       Next Visit Date:  No future appointments.          Patient Active Problem List:     Anxiety     Insomnia     HTN (hypertension)     Venous insufficiency     Overweight

## 2023-01-25 NOTE — TELEPHONE ENCOUNTER
Last visit:  1/12/2023  Next Visit Date:  No future appointments. Medication List:  Prior to Admission medications    Medication Sig Start Date End Date Taking? Authorizing Provider   HYDROcodone-acetaminophen (NORCO) 5-325 MG per tablet take 1 tablet by mouth every 6 hours for 7 days 1/6/23   Historical Provider, MD   amphetamine-dextroamphetamine (ADDERALL XR) 15 MG extended release capsule TAKE 1 CAPSULE BY MOUTH DAILY 1/11/23 2/10/23  Bhavani Olivas, DO   pantoprazole (PROTONIX) 40 MG tablet take 1 tablet by mouth every morning before breakfast 1/2/23   Bhavani Olivas, DO   ibuprofen (ADVIL;MOTRIN) 800 MG tablet Take 1 tablet by mouth every 8 hours as needed for Pain 1/1/23   Cristel Barreto MD   gabapentin (NEURONTIN) 300 MG capsule take 1 capsule by mouth every morning 1 capsule by mouth every AFTERNOON and 2 capsules by mouth every evening at bedtime 10/31/22 1/31/23  Bhavani Deisy, DO   magnesium oxide (MAG-OX) 400 MG tablet Take 1 tablet by mouth at bedtime 8/30/22   [facility], DO   buPROPion (WELLBUTRIN XL) 150 MG extended release tablet Take 1 tablet by mouth every morning 8/30/22   Bhavani Hope, DO   lamoTRIgine (LAMICTAL) 25 MG tablet take 1 tablet by mouth twice a day 8/30/22   Bhavani Hope, DO   citalopram (CELEXA) 40 MG tablet take 1 tablet by mouth once daily 8/10/22   Bhavani [place], DO   clobetasol (TEMOVATE) 0.05 % cream apply to affected area twice a day for 30 DAYS then if needed 3/18/21   Historical Provider, MD   estradiol (ESTRACE) 0.1 MG/GM vaginal cream apply ONE PEA SIZE DOLLOP ON EXTERNAL VAGINAL AREA AND SWIPE INTO. ..  (REFER TO PRESCRIPTION NOTES).  3/18/21   Historical Provider, MD   Multiple Vitamins-Minerals (HAIR SKIN AND NAILS FORMULA PO) Take by mouth    Historical Provider, MD   TURMERIC PO Take by mouth    Historical Provider, MD   FOLIC ACID PO Take 1,437 mg by mouth daily     Historical Provider, MD   saline nasal gel (AYR) GEL by Nasal route as needed for Congestion 5/31/19   Ezzie Courts, DO   acetaminophen (TYLENOL) 325 MG tablet Take 650 mg by mouth every 6 hours as needed.       Historical Provider, MD

## 2023-01-29 NOTE — PROGRESS NOTES
Physical Therapy  [facility]    Date: 10/10/2022  Patient Name: rTacy Koo        : 1970       Patient was a no show, no call, for this appointment.       Guillermo Pepe Shock Date: 10/10/2022 None

## 2023-02-02 ENCOUNTER — HOSPITAL ENCOUNTER (OUTPATIENT)
Dept: MRI IMAGING | Age: 53
Discharge: HOME | End: 2023-02-04
Payer: COMMERCIAL

## 2023-02-02 DIAGNOSIS — M76.822 POSTERIOR TIBIAL TENDON DYSFUNCTION (PTTD) OF LEFT LOWER EXTREMITY: ICD-10-CM

## 2023-02-02 PROCEDURE — 73721 MRI JNT OF LWR EXTRE W/O DYE: CPT

## 2023-02-13 DIAGNOSIS — G93.32 CHRONIC FATIGUE SYNDROME: ICD-10-CM

## 2023-02-13 RX ORDER — DEXTROAMPHETAMINE SACCHARATE, AMPHETAMINE ASPARTATE MONOHYDRATE, DEXTROAMPHETAMINE SULFATE AND AMPHETAMINE SULFATE 3.75; 3.75; 3.75; 3.75 MG/1; MG/1; MG/1; MG/1
CAPSULE, EXTENDED RELEASE ORAL
Qty: 30 CAPSULE | Refills: 0 | Status: SHIPPED | OUTPATIENT
Start: 2023-02-13 | End: 2023-03-15

## 2023-02-13 NOTE — TELEPHONE ENCOUNTER
Patient asking for a new script for her Adderall - patient uses [facility] Maintenance   Topic Date Due    Cervical cancer screen  Never done    Colorectal Cancer Screen  Never done    COVID-19 Vaccine (2 - Pfizer series) 10/13/2021    Flu vaccine (1) Never done    Shingles vaccine (1 of 2) Never done    Depression Monitoring  01/12/2024    Breast cancer screen  06/20/2024    Lipids  10/11/2027    DTaP/Tdap/Td vaccine (2 - Td or Tdap) 11/29/2028    Hepatitis C screen  Completed    HIV screen  Completed    Hepatitis A vaccine  Aged Out    Hib vaccine  Aged Out    Meningococcal (ACWY) vaccine  Aged Out    Pneumococcal 0-64 years Vaccine  Aged Out             (applicable per patient's age: Cancer Screenings, Depression Screening, Fall Risk Screening, Immunizations)    LDL Cholesterol (mg/dL)   Date Value   10/11/2022 116     AST (U/L)   Date Value   10/11/2022 17     ALT (U/L)   Date Value   10/11/2022 11     BUN (mg/dL)   Date Value   10/11/2022 6      (goal A1C is < 7)   (goal LDL is <100) need 30-50% reduction from baseline     BP Readings from Last 3 Encounters:   01/12/23 136/74   01/01/23 (!) 140/71   11/28/22 136/84    (goal /80)      All Future Testing planned in [facility]:  Lab Frequency Next Occurrence   MRI KNEE LEFT WO CONTRAST Once 01/13/2023       Next Visit Date:  No future appointments.          Patient Active Problem List:     Anxiety     Insomnia     HTN (hypertension)     Venous insufficiency     Overweight

## 2023-02-13 NOTE — TELEPHONE ENCOUNTER
Last visit:  1/12/2023  Next Visit Date:  No future appointments. Medication List:  Prior to Admission medications    Medication Sig Start Date End Date Taking? Authorizing Provider   ALPRAZolam Daryel Darling) 0.5 MG tablet Take 1 tablet by mouth 3 times daily as needed for Anxiety for up to 30 days. 1/25/23 2/24/23  Doddridge Cami, DO   HYDROcodone-acetaminophen ([address]) 5-325 MG per tablet take 1 tablet by mouth every 6 hours for 7 days 1/6/23   Historical Provider, MD   amphetamine-dextroamphetamine (ADDERALL XR) 15 MG extended release capsule TAKE 1 CAPSULE BY MOUTH DAILY 1/11/23 2/10/23  Yung Cami, DO   pantoprazole (PROTONIX) 40 MG tablet take 1 tablet by mouth every morning before breakfast 1/2/23   Doddridge Cami, DO   ibuprofen (ADVIL;MOTRIN) 800 MG tablet Take 1 tablet by mouth every 8 hours as needed for Pain 1/1/23   Olga Magaña MD   gabapentin (NEURONTIN) 300 MG capsule take 1 capsule by mouth every morning 1 capsule by mouth every AFTERNOON and 2 capsules by mouth every evening at bedtime 10/31/22 1/31/23  Doddridge Cami, DO   magnesium oxide (MAG-OX) 400 MG tablet Take 1 tablet by mouth at bedtime 8/30/22   Yung Cami, DO   buPROPion (WELLBUTRIN XL) 150 MG extended release tablet Take 1 tablet by mouth every morning 8/30/22   Doddridge Cami, DO   lamoTRIgine (LAMICTAL) 25 MG tablet take 1 tablet by mouth twice a day 8/30/22   Yung Cami, DO   citalopram (CELEXA) 40 MG tablet take 1 tablet by mouth once daily 8/10/22   Doddridge Cami, DO   clobetasol (TEMOVATE) 0.05 % cream apply to affected area twice a day for 30 DAYS then if needed 3/18/21   Historical Provider, MD   estradiol (ESTRACE) 0.1 MG/GM vaginal cream apply ONE PEA SIZE DOLLOP ON EXTERNAL VAGINAL AREA AND SWIPE INTO. ..  (REFER TO PRESCRIPTION NOTES).  3/18/21   Historical Provider, MD   Multiple Vitamins-Minerals (HAIR SKIN AND NAILS FORMULA PO) Take by mouth    Historical Provider, MD   TURMERIC PO Take by mouth    Historical Provider, MD   FOLIC ACID PO Take 8,046 mg by mouth daily     Historical Provider, MD   saline nasal gel (AYR) GEL by Nasal route as needed for Congestion 5/31/19   Chandrika Calvillo DO   acetaminophen (TYLENOL) 325 MG tablet Take 650 mg by mouth every 6 hours as needed.       Historical Provider, MD

## 2023-02-17 ENCOUNTER — HOSPITAL ENCOUNTER (OUTPATIENT)
Dept: GENERAL RADIOLOGY | Age: 53
End: 2023-02-17
Payer: COMMERCIAL

## 2023-02-17 ENCOUNTER — HOSPITAL ENCOUNTER (OUTPATIENT)
Age: 53
End: 2023-02-17
Payer: COMMERCIAL

## 2023-02-17 DIAGNOSIS — M23.342 OTHER MENISCUS DERANGEMENTS, ANTERIOR HORN OF LATERAL MENISCUS, LEFT KNEE: ICD-10-CM

## 2023-02-17 DIAGNOSIS — S83.32XA TEAR OF ARTICULAR CARTILAGE OF LEFT KNEE AS CURRENT INJURY, INITIAL ENCOUNTER: ICD-10-CM

## 2023-02-17 DIAGNOSIS — S83.512A RUPTURE OF ANTERIOR CRUCIATE LIGAMENT OF LEFT KNEE, INITIAL ENCOUNTER: ICD-10-CM

## 2023-02-17 PROCEDURE — 73564 X-RAY EXAM KNEE 4 OR MORE: CPT

## 2023-02-23 RX ORDER — BUPROPION HYDROCHLORIDE 150 MG/1
150 TABLET ORAL EVERY MORNING
Qty: 90 TABLET | Refills: 1 | Status: SHIPPED | OUTPATIENT
Start: 2023-02-23

## 2023-02-23 NOTE — TELEPHONE ENCOUNTER
Last OV: 1/12/2023 11/28/22 chronic   Last RX:    Next scheduled apt: Visit date not found             Surescript requesting a refill

## 2023-03-03 ENCOUNTER — HOSPITAL ENCOUNTER (OUTPATIENT)
Age: 53
Discharge: HOME | End: 2023-03-03
Payer: COMMERCIAL

## 2023-03-03 ENCOUNTER — OFFICE VISIT (OUTPATIENT)
Dept: FAMILY MEDICINE CLINIC | Age: 53
End: 2023-03-03

## 2023-03-03 VITALS
DIASTOLIC BLOOD PRESSURE: 80 MMHG | SYSTOLIC BLOOD PRESSURE: 128 MMHG | HEIGHT: 68 IN | OXYGEN SATURATION: 99 % | WEIGHT: 195 LBS | HEART RATE: 86 BPM | BODY MASS INDEX: 29.55 KG/M2

## 2023-03-03 DIAGNOSIS — Z83.3 FAMILY HISTORY OF DIABETES MELLITUS: ICD-10-CM

## 2023-03-03 DIAGNOSIS — Z12.11 SCREENING FOR COLORECTAL CANCER: ICD-10-CM

## 2023-03-03 DIAGNOSIS — R63.5 WEIGHT GAIN: ICD-10-CM

## 2023-03-03 DIAGNOSIS — D64.9 NORMOCYTIC ANEMIA: ICD-10-CM

## 2023-03-03 DIAGNOSIS — R35.0 URINARY FREQUENCY: Primary | ICD-10-CM

## 2023-03-03 DIAGNOSIS — Z12.12 SCREENING FOR COLORECTAL CANCER: ICD-10-CM

## 2023-03-03 LAB
ABSOLUTE EOS #: 0.1 K/UL (ref 0–0.4)
ABSOLUTE LYMPH #: 2.3 K/UL (ref 1–4.8)
ABSOLUTE MONO #: 0.6 K/UL (ref 0–1)
BASOPHILS # BLD: 0 % (ref 0–2)
BASOPHILS ABSOLUTE: 0 K/UL (ref 0–0.2)
BILIRUBIN, POC: NORMAL
BLOOD URINE, POC: NORMAL
CLARITY, POC: CLEAR
COLOR, POC: YELLOW
DIFFERENTIAL TYPE: YES
EOSINOPHILS RELATIVE PERCENT: 1 % (ref 0–5)
GLUCOSE URINE, POC: NORMAL
HBA1C MFR BLD: 5.1 %
HCT VFR BLD AUTO: 36.8 % (ref 36–46)
HGB BLD-MCNC: 12.2 G/DL (ref 12–16)
KETONES, POC: NORMAL
LEUKOCYTE EST, POC: NORMAL
LYMPHOCYTES # BLD: 24 % (ref 15–40)
MCH RBC QN AUTO: 28.2 PG (ref 26–34)
MCHC RBC AUTO-ENTMCNC: 33.2 G/DL (ref 31–37)
MCV RBC AUTO: 85 FL (ref 80–100)
MONOCYTES # BLD: 7 % (ref 4–8)
NITRITE, POC: NORMAL
PDW BLD-RTO: 14.6 % (ref 12.1–15.2)
PH, POC: 6
PLATELET # BLD AUTO: 383 K/UL (ref 140–450)
PROTEIN, POC: NORMAL
RBC # BLD: 4.34 M/UL (ref 4–5.2)
SEG NEUTROPHILS: 68 % (ref 47–75)
SEGMENTED NEUTROPHILS ABSOLUTE COUNT: 6.6 K/UL (ref 2.5–7)
SPECIFIC GRAVITY, POC: 1
TSH SERPL-ACNC: 2.45 UIU/ML (ref 0.3–5)
UROBILINOGEN, POC: 0.2
WBC # BLD AUTO: 9.7 K/UL (ref 3.5–11)

## 2023-03-03 PROCEDURE — 85025 COMPLETE CBC W/AUTO DIFF WBC: CPT

## 2023-03-03 PROCEDURE — 84443 ASSAY THYROID STIM HORMONE: CPT

## 2023-03-03 PROCEDURE — 36415 COLL VENOUS BLD VENIPUNCTURE: CPT

## 2023-03-03 PROCEDURE — 83540 ASSAY OF IRON: CPT

## 2023-03-03 RX ORDER — SULFAMETHOXAZOLE AND TRIMETHOPRIM 800; 160 MG/1; MG/1
1 TABLET ORAL 2 TIMES DAILY
Qty: 10 TABLET | Refills: 0 | Status: SHIPPED | OUTPATIENT
Start: 2023-03-03 | End: 2023-03-08

## 2023-03-03 SDOH — ECONOMIC STABILITY: FOOD INSECURITY: WITHIN THE PAST 12 MONTHS, THE FOOD YOU BOUGHT JUST DIDN'T LAST AND YOU DIDN'T HAVE MONEY TO GET MORE.: NEVER TRUE

## 2023-03-03 SDOH — ECONOMIC STABILITY: FOOD INSECURITY: WITHIN THE PAST 12 MONTHS, YOU WORRIED THAT YOUR FOOD WOULD RUN OUT BEFORE YOU GOT MONEY TO BUY MORE.: NEVER TRUE

## 2023-03-03 SDOH — ECONOMIC STABILITY: HOUSING INSECURITY
IN THE LAST 12 MONTHS, WAS THERE A TIME WHEN YOU DID NOT HAVE A STEADY PLACE TO SLEEP OR SLEPT IN A SHELTER (INCLUDING NOW)?: NO

## 2023-03-03 SDOH — ECONOMIC STABILITY: INCOME INSECURITY: HOW HARD IS IT FOR YOU TO PAY FOR THE VERY BASICS LIKE FOOD, HOUSING, MEDICAL CARE, AND HEATING?: NOT HARD AT ALL

## 2023-03-03 NOTE — PROGRESS NOTES
Name: Irene Galvez  : 1970         Chief Complaint:     Chief Complaint   Patient presents with    Polydipsia    Urinary Frequency    Fatigue    Weight Gain       History of Present Illness:      Irene Galvez is a 52 y.o.  female who presents with Polydipsia, Urinary Frequency, Fatigue, and Weight Gain      HPI    Pt c/o significant weight gain which started about 3 wks after knee fx (new yrs len). 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 No assistive device or immobilizer but supposed to keep weight off the LLE as much as possible. Eating apples, oranges, rice cakes, lately also chips and candy, tootsie roll pops. Drinking a \"skinny fit\" collagen drink in the morning for past few wks. Feeling tired. Concerned re very strong family h/o DM, all siblings, at least one parent. No h/o PCOS though she did have irreg menses and took 4 yrs to get pregnant with her only child.    Also c/o urinary frequency, mild dysuria, some R low back pain for past few days.    Medical History:     Patient Active Problem List   Diagnosis    Anxiety    Insomnia    HTN (hypertension)    Venous insufficiency    Overweight       Medications:       Prior to Admission medications    Medication Sig Start Date End Date Taking? Authorizing Provider   sulfamethoxazole-trimethoprim (BACTRIM DS;SEPTRA DS) 800-160 MG per tablet Take 1 tablet by mouth 2 times daily for 5 days 3/3/23 3/8/23 Yes Lindsey Lucero DO   buPROPion (WELLBUTRIN XL) 150 MG extended release tablet take 1 tablet by mouth every morning 23  Yes Lindsey Lucero DO   amphetamine-dextroamphetamine (ADDERALL XR) 15 MG extended release capsule TAKE 1 CAPSULE BY MOUTH DAILY 2/13/23 3/15/23 Yes Lindsey Lucero DO   pantoprazole (PROTONIX) 40 MG tablet take 1 tablet by mouth every morning before breakfast 23  Yes Lindsey LORENZO  Vesna Aguilera,    ibuprofen (ADVIL;MOTRIN) 800 MG tablet Take 1 tablet by mouth every 8 hours as needed for Pain 1/1/23  Yes Becka Anne MD   gabapentin (NEURONTIN) 300 MG capsule take 1 capsule by mouth every morning 1 capsule by mouth every AFTERNOON and 2 capsules by mouth every evening at bedtime 10/31/22 3/3/23 Yes Freddy Bowen DO   magnesium oxide (MAG-OX) 400 MG tablet Take 1 tablet by mouth at bedtime 8/30/22  Yes Freddy Bowen DO   lamoTRIgine (LAMICTAL) 25 MG tablet take 1 tablet by mouth twice a day 8/30/22  Yes Freddy Bowen DO   citalopram (CELEXA) 40 MG tablet take 1 tablet by mouth once daily 8/10/22  Yes Freddy Bowen DO   clobetasol (TEMOVATE) 0.05 % cream apply to affected area twice a day for 30 DAYS then if needed 3/18/21  Yes Historical Provider, MD   estradiol (ESTRACE) 0.1 MG/GM vaginal cream apply ONE PEA SIZE DOLLOP ON EXTERNAL VAGINAL AREA AND SWIPE INTO. ..  (REFER TO PRESCRIPTION NOTES). 3/18/21  Yes Historical Provider, MD   Multiple Vitamins-Minerals (HAIR SKIN AND NAILS FORMULA PO) Take by mouth   Yes Historical Provider, MD   TURMERIC PO Take by mouth   Yes Historical Provider, MD   FOLIC ACID PO Take 5,299 mg by mouth daily    Yes Historical Provider, MD   saline nasal gel (AYR) GEL by Nasal route as needed for Congestion 5/31/19  Yes Freddy Bowen DO   acetaminophen (TYLENOL) 325 MG tablet Take 650 mg by mouth every 6 hours as needed. Yes Historical Provider, MD        Allergies:       Patient has no known allergies. Physical Exam:     Vitals:  /80   Pulse 86   Ht 5' 8\" (1.727 m)   Wt 195 lb (88.5 kg)   LMP 10/03/2017 (Exact Date)   SpO2 99%   BMI 29.65 kg/m²   Physical Exam  Vitals and nursing note reviewed. Constitutional:       General: She is not in acute distress. Appearance: She is well-developed. Pulmonary:      Effort: Pulmonary effort is normal.   Skin:     General: Skin is warm and dry.    Neurological:      Mental Status: She is alert and oriented to person, place, and time. Psychiatric:         Mood and Affect: Mood normal.         Behavior: Behavior normal.       Data:     Lab Results   Component Value Date/Time     10/11/2022 10:01 AM    K 3.8 10/11/2022 10:01 AM     10/11/2022 10:01 AM    CO2 27 10/11/2022 10:01 AM    BUN 6 10/11/2022 10:01 AM    CREATININE 0.88 10/11/2022 10:01 AM    GLUCOSE 92 10/11/2022 10:01 AM    PROT 6.9 10/11/2022 10:01 AM    LABALBU 4.2 10/11/2022 10:01 AM    BILITOT 0.5 10/11/2022 10:01 AM    ALKPHOS 95 10/11/2022 10:01 AM    AST 17 10/11/2022 10:01 AM    ALT 11 10/11/2022 10:01 AM     Lab Results   Component Value Date/Time    WBC 8.4 11/28/2022 03:29 PM    RBC 4.19 11/28/2022 03:29 PM    HGB 11.8 11/28/2022 03:29 PM    HCT 35.9 11/28/2022 03:29 PM    MCV 85.7 11/28/2022 03:29 PM    MCH 28.3 11/28/2022 03:29 PM    MCHC 33.0 11/28/2022 03:29 PM    RDW 14.5 11/28/2022 03:29 PM     11/28/2022 03:29 PM    MPV NOT REPORTED 03/08/2021 02:30 PM     Lab Results   Component Value Date/Time    TSH 1.28 03/08/2021 02:30 PM     Lab Results   Component Value Date/Time    CHOL 199 10/11/2022 10:01 AM    HDL 69 10/11/2022 10:01 AM    LABA1C 5.1 03/03/2023 02:39 PM         Assessment & Plan:        Diagnosis Orders   1. Urinary frequency  POCT Urinalysis no Micro      2. Family history of diabetes mellitus  POCT glycosylated hemoglobin (Hb A1C)      3. Normocytic anemia  CBC with Auto Differential    Iron      4. Weight gain  TSH with Reflex      5. Screening for colorectal cancer  Fecal DNA Colorectal cancer screening (Cologuard)        Urine frequency and mild dysuria, UA consistent with possible early or mild UTI. Bactrim 5-day course prescribed. 2.  Family history of diabetes but patient's sugars well within normal range. Reassured. 3.  Rechecking labs.   4.  Weight gain, strongly suspect due to major change in activity level with left tibial plateau fracture and subsequent immobilization and decreased activity. Also suspect she may not be eating enough at some times of day, then other times of day may be binging on high caloric density foods. Advised eating satisfying meals that include carb, fat, protein, and produce, and eating every ~4h while awake. Reassured. Checking thyroid for completeness.        Requested Prescriptions     Signed Prescriptions Disp Refills    sulfamethoxazole-trimethoprim (BACTRIM DS;SEPTRA DS) 800-160 MG per tablet 10 tablet 0     Sig: Take 1 tablet by mouth 2 times daily for 5 days         signed by Graham Jacobs DO on 3/3/2023 at 3:03 PM  [facility] [address]  Dept: [phone]

## 2023-03-04 LAB — IRON SERPL-MCNC: 43 UG/DL (ref 37–145)

## 2023-03-14 ENCOUNTER — TELEPHONE (OUTPATIENT)
Dept: FAMILY MEDICINE CLINIC | Age: 53
End: 2023-03-14

## 2023-03-14 NOTE — TELEPHONE ENCOUNTER
Recommend doing some walking, can try a warm bath - I'm reluctant to have her take anything else since she took so much ex-lax. If not passing some by tomorrow, would recommend trying a fleets enema tomorrow.

## 2023-03-14 NOTE — TELEPHONE ENCOUNTER
No BM for 3 days. Took 3 ex-lax yesterday and the day before. Passing gas but stomach bloated. Started several new vitamins (rosemary, jennifer seed oil, alphaliphoic, folic acid, collagen, cranberry) states that she is eating and she is drinking plenty of water. Please advise            Last visit:  3/3/2023  Next Visit Date:  No future appointments. Medication List:  Prior to Admission medications    Medication Sig Start Date End Date Taking? Authorizing Provider   buPROPion (WELLBUTRIN XL) 150 MG extended release tablet take 1 tablet by mouth every morning 2/23/23   Michael Manzanares, DO   amphetamine-dextroamphetamine (ADDERALL XR) 15 MG extended release capsule TAKE 1 CAPSULE BY MOUTH DAILY 2/13/23 3/15/23  Michael Manzanares, DO   pantoprazole (PROTONIX) 40 MG tablet take 1 tablet by mouth every morning before breakfast 1/2/23   Michael Manzanares, DO   ibuprofen (ADVIL;MOTRIN) 800 MG tablet Take 1 tablet by mouth every 8 hours as needed for Pain 1/1/23   Madi Traylor MD   gabapentin (NEURONTIN) 300 MG capsule take 1 capsule by mouth every morning 1 capsule by mouth every AFTERNOON and 2 capsules by mouth every evening at bedtime 10/31/22 3/3/23  Michael Manzanares, DO   magnesium oxide (MAG-OX) 400 MG tablet Take 1 tablet by mouth at bedtime 8/30/22   Michael Manzanares, DO   lamoTRIgine (LAMICTAL) 25 MG tablet take 1 tablet by mouth twice a day 8/30/22   Michael Manzanares, DO   citalopram (CELEXA) 40 MG tablet take 1 tablet by mouth once daily 8/10/22   Michael Manzanares, DO   clobetasol (TEMOVATE) 0.05 % cream apply to affected area twice a day for 30 DAYS then if needed 3/18/21   Historical Provider, MD   estradiol (ESTRACE) 0.1 MG/GM vaginal cream apply ONE PEA SIZE DOLLOP ON EXTERNAL VAGINAL AREA AND SWIPE INTO. ..  (REFER TO PRESCRIPTION NOTES).  3/18/21   Historical Provider, MD   Multiple Vitamins-Minerals (HAIR SKIN AND NAILS FORMULA PO) Take by mouth    Historical Provider, MD   TURMERIC PO Take by mouth    Historical Provider, MD   FOLIC ACID PO Take 1,000 mg by mouth daily     Historical Provider, MD   saline nasal gel (AYR) GEL by Nasal route as needed for Congestion 5/31/19   Lindsey Lucero DO   acetaminophen (TYLENOL) 325 MG tablet Take 650 mg by mouth every 6 hours as needed.      Historical Provider, MD

## 2023-03-17 ENCOUNTER — HOSPITAL ENCOUNTER (OUTPATIENT)
Dept: GENERAL RADIOLOGY | Age: 53
End: 2023-03-17
Payer: COMMERCIAL

## 2023-03-17 ENCOUNTER — HOSPITAL ENCOUNTER (OUTPATIENT)
Age: 53
End: 2023-03-17
Payer: COMMERCIAL

## 2023-03-17 DIAGNOSIS — R52 PAIN: ICD-10-CM

## 2023-03-17 PROCEDURE — 73564 X-RAY EXAM KNEE 4 OR MORE: CPT

## 2023-03-20 RX ORDER — MAGNESIUM OXIDE 400 MG/1
TABLET ORAL
Qty: 30 TABLET | Refills: 5 | Status: SHIPPED | OUTPATIENT
Start: 2023-03-20

## 2023-03-22 DIAGNOSIS — G93.32 CHRONIC FATIGUE SYNDROME: ICD-10-CM

## 2023-03-22 RX ORDER — DEXTROAMPHETAMINE SACCHARATE, AMPHETAMINE ASPARTATE MONOHYDRATE, DEXTROAMPHETAMINE SULFATE AND AMPHETAMINE SULFATE 3.75; 3.75; 3.75; 3.75 MG/1; MG/1; MG/1; MG/1
CAPSULE, EXTENDED RELEASE ORAL
Qty: 30 CAPSULE | Refills: 0 | Status: SHIPPED | OUTPATIENT
Start: 2023-03-22 | End: 2023-04-21

## 2023-03-22 NOTE — TELEPHONE ENCOUNTER
Last visit:  3/3/2023  Next Visit Date:  No future appointments. Medication List:  Prior to Admission medications    Medication Sig Start Date End Date Taking? Authorizing Provider   Magnesium Oxide, Antacid, 400 MG TABS take 1 tablet by mouth at bedtime 3/20/23   Elzbieta Melo DO   buPROPion (WELLBUTRIN XL) 150 MG extended release tablet take 1 tablet by mouth every morning 2/23/23   Elzbieta Melo, DO   amphetamine-dextroamphetamine (ADDERALL XR) 15 MG extended release capsule TAKE 1 CAPSULE BY MOUTH DAILY 2/13/23 3/15/23  Elzbieta Melo DO   pantoprazole (PROTONIX) 40 MG tablet take 1 tablet by mouth every morning before breakfast 1/2/23   Elzbieta Melo DO   ibuprofen (ADVIL;MOTRIN) 800 MG tablet Take 1 tablet by mouth every 8 hours as needed for Pain 1/1/23   Ana Cristina Stephens MD   gabapentin (NEURONTIN) 300 MG capsule take 1 capsule by mouth every morning 1 capsule by mouth every AFTERNOON and 2 capsules by mouth every evening at bedtime 10/31/22 3/3/23  Elzbieta Melo DO   lamoTRIgine (LAMICTAL) 25 MG tablet take 1 tablet by mouth twice a day 8/30/22   Elzbieta Melo DO   citalopram (CELEXA) 40 MG tablet take 1 tablet by mouth once daily 8/10/22   Elzbieta Melo DO   clobetasol (TEMOVATE) 0.05 % cream apply to affected area twice a day for 30 DAYS then if needed 3/18/21   Historical Provider, MD   estradiol (ESTRACE) 0.1 MG/GM vaginal cream apply ONE PEA SIZE DOLLOP ON EXTERNAL VAGINAL AREA AND SWIPE INTO. ..  (REFER TO PRESCRIPTION NOTES).  3/18/21   Historical Provider, MD   Multiple Vitamins-Minerals (HAIR SKIN AND NAILS FORMULA PO) Take by mouth    Historical Provider, MD   TURMERIC PO Take by mouth    Historical Provider, MD   FOLIC ACID PO Take 1,588 mg by mouth daily     Historical Provider, MD   saline nasal gel (AYR) GEL by Nasal route as needed for Congestion 5/31/19   Elzbieta Melo DO   acetaminophen (TYLENOL) 325 MG tablet Take 650 mg by mouth every 6 hours as

## 2023-03-22 NOTE — TELEPHONE ENCOUNTER
Adderall 15 mg    [facility] Maintenance   Topic Date Due    Cervical cancer screen  Never done    COVID-19 Vaccine (2 - Pfizer series) 10/13/2021    Flu vaccine (1) Never done    Shingles vaccine (1 of 2) Never done    Depression Monitoring  01/12/2024    Breast cancer screen  06/20/2024    Colorectal Cancer Screen  03/08/2026    Lipids  10/11/2027    DTaP/Tdap/Td vaccine (2 - Td or Tdap) 11/29/2028    Hepatitis C screen  Completed    HIV screen  Completed    Hepatitis A vaccine  Aged Out    Hib vaccine  Aged Out    Meningococcal (ACWY) vaccine  Aged Out    Pneumococcal 0-64 years Vaccine  Aged Out             (applicable per patient's age: Cancer Screenings, Depression Screening, Fall Risk Screening, Immunizations)    Hemoglobin A1C (%)   Date Value   03/03/2023 5.1     LDL Cholesterol (mg/dL)   Date Value   10/11/2022 116     AST (U/L)   Date Value   10/11/2022 17     ALT (U/L)   Date Value   10/11/2022 11     BUN (mg/dL)   Date Value   10/11/2022 6      (goal A1C is < 7)   (goal LDL is <100) need 30-50% reduction from baseline     BP Readings from Last 3 Encounters:   03/03/23 128/80   01/12/23 136/74   01/01/23 (!) 140/71    (goal /80)      All Future Testing planned in [facility]:  Lab Frequency Next Occurrence   MRI KNEE LEFT WO CONTRAST Once 01/13/2023       Next Visit Date:  No future appointments.          Patient Active Problem List:     Anxiety     Insomnia     HTN (hypertension)     Venous insufficiency     Overweight

## 2023-03-27 RX ORDER — LAMOTRIGINE 25 MG/1
TABLET ORAL
Qty: 60 TABLET | Refills: 5 | Status: SHIPPED | OUTPATIENT
Start: 2023-03-27

## 2023-03-27 NOTE — TELEPHONE ENCOUNTER
Last OV 3/3/23 for urinary frequency,weight gain and anemia  Requesting refill on lamictal thru [facility]

## 2023-04-03 DIAGNOSIS — G93.32 CHRONIC FATIGUE SYNDROME: ICD-10-CM

## 2023-04-03 RX ORDER — DEXTROAMPHETAMINE SACCHARATE, AMPHETAMINE ASPARTATE MONOHYDRATE, DEXTROAMPHETAMINE SULFATE AND AMPHETAMINE SULFATE 3.75; 3.75; 3.75; 3.75 MG/1; MG/1; MG/1; MG/1
CAPSULE, EXTENDED RELEASE ORAL
Qty: 30 CAPSULE | Refills: 0 | Status: SHIPPED | OUTPATIENT
Start: 2023-04-10 | End: 2023-04-04 | Stop reason: RX

## 2023-04-03 NOTE — TELEPHONE ENCOUNTER
Last OV: 3/3/2023      Pt requesting refill on adderall, needs sent to [facility], [place] pending.       Thank you

## 2023-04-04 ENCOUNTER — TELEPHONE (OUTPATIENT)
Dept: FAMILY MEDICINE CLINIC | Age: 53
End: 2023-04-04

## 2023-04-04 DIAGNOSIS — G93.32 CHRONIC FATIGUE SYNDROME: Primary | ICD-10-CM

## 2023-04-04 RX ORDER — MODAFINIL 200 MG/1
200 TABLET ORAL DAILY
Qty: 30 TABLET | Refills: 0 | Status: SHIPPED | OUTPATIENT
Start: 2023-04-04 | End: 2023-05-04

## 2023-04-04 NOTE — TELEPHONE ENCOUNTER
Morena Mcadams is having a hard time filling her prescription. She has called multiple pharmacies and they do not have her adderall. She is wanting to know if there is something else similar that she can have called in for her. Please let Morena Mcadams know. Health Maintenance   Topic Date Due    Cervical cancer screen  Never done    COVID-19 Vaccine (2 - Pfizer series) 10/13/2021    Shingles vaccine (1 of 2) Never done    Flu vaccine (Season Ended) 08/01/2023    Depression Monitoring  01/12/2024    Breast cancer screen  06/20/2024    Colorectal Cancer Screen  03/08/2026    Lipids  10/11/2027    DTaP/Tdap/Td vaccine (2 - Td or Tdap) 11/29/2028    Hepatitis C screen  Completed    HIV screen  Completed    Hepatitis A vaccine  Aged Out    Hib vaccine  Aged Out    Meningococcal (ACWY) vaccine  Aged Out    Pneumococcal 0-64 years Vaccine  Aged Out             (applicable per patient's age: Cancer Screenings, Depression Screening, Fall Risk Screening, Immunizations)    Hemoglobin A1C (%)   Date Value   03/03/2023 5.1     LDL Cholesterol (mg/dL)   Date Value   10/11/2022 116     AST (U/L)   Date Value   10/11/2022 17     ALT (U/L)   Date Value   10/11/2022 11     BUN (mg/dL)   Date Value   10/11/2022 6      (goal A1C is < 7)   (goal LDL is <100) need 30-50% reduction from baseline     BP Readings from Last 3 Encounters:   03/03/23 128/80   01/12/23 136/74   01/01/23 (!) 140/71    (goal /80)      All Future Testing planned in [facility]:  Lab Frequency Next Occurrence   MRI KNEE LEFT WO CONTRAST Once 01/13/2023       Next Visit Date:  No future appointments.          Patient Active Problem List:     Anxiety     Insomnia     HTN (hypertension)     Venous insufficiency     Overweight

## 2023-04-26 ENCOUNTER — OFFICE VISIT (OUTPATIENT)
Dept: FAMILY MEDICINE CLINIC | Age: 53
End: 2023-04-26
Payer: COMMERCIAL

## 2023-04-26 VITALS
HEART RATE: 74 BPM | BODY MASS INDEX: 30.46 KG/M2 | HEIGHT: 68 IN | WEIGHT: 201 LBS | SYSTOLIC BLOOD PRESSURE: 126 MMHG | OXYGEN SATURATION: 98 % | DIASTOLIC BLOOD PRESSURE: 80 MMHG

## 2023-04-26 DIAGNOSIS — T74.31XD ADULT SUBJECT TO EMOTIONAL ABUSE, SUBSEQUENT ENCOUNTER: ICD-10-CM

## 2023-04-26 DIAGNOSIS — F32.A ANXIETY AND DEPRESSION: ICD-10-CM

## 2023-04-26 DIAGNOSIS — F41.9 ANXIETY AND DEPRESSION: ICD-10-CM

## 2023-04-26 DIAGNOSIS — S82.122D CLOSED FRACTURE OF LATERAL PORTION OF LEFT TIBIAL PLATEAU WITH ROUTINE HEALING, SUBSEQUENT ENCOUNTER: Primary | ICD-10-CM

## 2023-04-26 DIAGNOSIS — R63.5 WEIGHT GAIN: ICD-10-CM

## 2023-04-26 PROCEDURE — 1036F TOBACCO NON-USER: CPT | Performed by: FAMILY MEDICINE

## 2023-04-26 PROCEDURE — 3017F COLORECTAL CA SCREEN DOC REV: CPT | Performed by: FAMILY MEDICINE

## 2023-04-26 PROCEDURE — 3078F DIAST BP <80 MM HG: CPT | Performed by: FAMILY MEDICINE

## 2023-04-26 PROCEDURE — 99214 OFFICE O/P EST MOD 30 MIN: CPT | Performed by: FAMILY MEDICINE

## 2023-04-26 PROCEDURE — G8427 DOCREV CUR MEDS BY ELIG CLIN: HCPCS | Performed by: FAMILY MEDICINE

## 2023-04-26 PROCEDURE — 3074F SYST BP LT 130 MM HG: CPT | Performed by: FAMILY MEDICINE

## 2023-04-26 PROCEDURE — G8417 CALC BMI ABV UP PARAM F/U: HCPCS | Performed by: FAMILY MEDICINE

## 2023-04-26 RX ORDER — GABAPENTIN 300 MG/1
CAPSULE ORAL
Qty: 120 CAPSULE | Refills: 2 | Status: CANCELLED | OUTPATIENT
Start: 2023-04-26 | End: 2023-10-20

## 2023-04-26 RX ORDER — CITALOPRAM 20 MG/1
TABLET ORAL
Qty: 30 TABLET | Refills: 0 | Status: SHIPPED | OUTPATIENT
Start: 2023-04-26

## 2023-05-11 DIAGNOSIS — G93.32 CHRONIC FATIGUE SYNDROME: ICD-10-CM

## 2023-05-11 DIAGNOSIS — F41.9 ANXIETY: ICD-10-CM

## 2023-05-11 RX ORDER — DEXTROAMPHETAMINE SACCHARATE, AMPHETAMINE ASPARTATE MONOHYDRATE, DEXTROAMPHETAMINE SULFATE AND AMPHETAMINE SULFATE 3.75; 3.75; 3.75; 3.75 MG/1; MG/1; MG/1; MG/1
CAPSULE, EXTENDED RELEASE ORAL
Qty: 30 CAPSULE | Refills: 0 | Status: SHIPPED | OUTPATIENT
Start: 2023-05-11 | End: 2023-06-03

## 2023-05-11 RX ORDER — ALPRAZOLAM 0.5 MG/1
0.5 TABLET ORAL 3 TIMES DAILY PRN
Qty: 60 TABLET | Refills: 0 | Status: SHIPPED | OUTPATIENT
Start: 2023-05-11 | End: 2023-06-10

## 2023-05-11 NOTE — TELEPHONE ENCOUNTER
Last visit:  4/26/2023  Next Visit Date:    Future Appointments   Date Time Provider Reginaldo Echavarria   5/26/2023  6:40 AM Thomson [place], DO [facility]         Medication List:  Prior to Admission medications    Medication Sig Start Date End Date Taking? Authorizing Provider   citalopram (CELEXA) 20 MG tablet take 1 tablet by mouth once daily 4/26/23   Thomson [place], DO   lamoTRIgine (LAMICTAL) 25 MG tablet take 1 tablet by mouth twice a day 3/27/23   Thomson [place], DO   Magnesium Oxide, Antacid, 400 MG TABS take 1 tablet by mouth at bedtime 3/20/23   Thomson [place], DO   buPROPion (WELLBUTRIN XL) 150 MG extended release tablet take 1 tablet by mouth every morning 2/23/23   Thomson [place], DO   pantoprazole (PROTONIX) 40 MG tablet take 1 tablet by mouth every morning before breakfast 1/2/23   Thomson [place], DO   ibuprofen (ADVIL;MOTRIN) 800 MG tablet Take 1 tablet by mouth every 8 hours as needed for Pain 1/1/23   Sarah Davey MD   gabapentin (NEURONTIN) 300 MG capsule take 1 capsule by mouth every morning 1 capsule by mouth every AFTERNOON and 2 capsules by mouth every evening at bedtime 10/31/22 4/26/23  Thomson [place], DO   clobetasol (TEMOVATE) 0.05 % cream apply to affected area twice a day for 30 DAYS then if needed 3/18/21   Historical Provider, MD   estradiol (ESTRACE) 0.1 MG/GM vaginal cream apply ONE PEA SIZE DOLLOP ON EXTERNAL VAGINAL AREA AND SWIPE INTO. ..  (REFER TO PRESCRIPTION NOTES). 3/18/21   Historical Provider, MD   Multiple Vitamins-Minerals (HAIR SKIN AND NAILS FORMULA PO) Take by mouth    Historical Provider, MD   TURMERIC PO Take by mouth    Historical Provider, MD   FOLIC ACID PO Take 6,327 mg by mouth daily     Historical Provider, MD   saline nasal gel (AYR) GEL by Nasal route as needed for Congestion 5/31/19   Thomson [place], DO   acetaminophen (TYLENOL) 325 MG tablet Take 650 mg by mouth every 6 hours as needed.       Historical Provider, MD

## 2023-05-11 NOTE — TELEPHONE ENCOUNTER
Patient asking for new scripts to be sent to [address] [place] [address] for Adderall & Xanax

## 2023-05-22 RX ORDER — CITALOPRAM 20 MG/1
TABLET ORAL
Qty: 30 TABLET | Refills: 0 | Status: SHIPPED | OUTPATIENT
Start: 2023-05-22

## 2023-05-22 NOTE — TELEPHONE ENCOUNTER
Last visit:  4/26/2023  Next Visit Date:    Future Appointments   Date Time Provider Reginaldo Echavarria   5/26/2023  6:40 AM DO [facility]         Medication List:  Prior to Admission medications    Medication Sig Start Date End Date Taking? Authorizing Provider   amphetamine-dextroamphetamine (ADDERALL XR) 15 MG extended release capsule TAKE 1 CAPSULE BY MOUTH DAILY 5/11/23 6/3/23  Jadiel Martinez, DO   ALPRAZolam Christa Douse) 0.5 MG tablet Take 1 tablet by mouth 3 times daily as needed for Anxiety for up to 30 days. 5/11/23 6/10/23  Jadiel Martinez, DO   citalopram (CELEXA) 20 MG tablet take 1 tablet by mouth once daily 4/26/23   Jadiel Martinez, DO   lamoTRIgine (LAMICTAL) 25 MG tablet take 1 tablet by mouth twice a day 3/27/23   Jadiel Martinez, DO   Magnesium Oxide, Antacid, 400 MG TABS take 1 tablet by mouth at bedtime 3/20/23   Jadiel Martinez, DO   buPROPion (WELLBUTRIN XL) 150 MG extended release tablet take 1 tablet by mouth every morning 2/23/23   Jadiel Martinez, DO   pantoprazole (PROTONIX) 40 MG tablet take 1 tablet by mouth every morning before breakfast 1/2/23   Jadiel Martinez, DO   ibuprofen (ADVIL;MOTRIN) 800 MG tablet Take 1 tablet by mouth every 8 hours as needed for Pain 1/1/23   Sara Tang MD   gabapentin (NEURONTIN) 300 MG capsule take 1 capsule by mouth every morning 1 capsule by mouth every AFTERNOON and 2 capsules by mouth every evening at bedtime 10/31/22 4/26/23  Jadiel Martinez, DO   clobetasol (TEMOVATE) 0.05 % cream apply to affected area twice a day for 30 DAYS then if needed 3/18/21   Historical Provider, MD   estradiol (ESTRACE) 0.1 MG/GM vaginal cream apply ONE PEA SIZE DOLLOP ON EXTERNAL VAGINAL AREA AND SWIPE INTO. ..  (REFER TO PRESCRIPTION NOTES).  3/18/21   Historical Provider, MD   Multiple Vitamins-Minerals (HAIR SKIN AND NAILS FORMULA PO) Take by mouth    Historical Provider, MD   TURMERIC PO Take by mouth    Historical Provider, MD

## 2023-05-30 ENCOUNTER — TELEPHONE (OUTPATIENT)
Dept: FAMILY MEDICINE CLINIC | Age: 53
End: 2023-05-30

## 2023-05-30 NOTE — TELEPHONE ENCOUNTER
Omero Campa was changed from adderall to modafinil. She said she wants to go back to the adderall because the current medication is making her even more tired. She was switched because the pharmacies were having a hard time filling this prescription. Can she go back to adderall? ? She missed her appointment on 5/26/23. Health Maintenance   Topic Date Due    Cervical cancer screen  Never done    COVID-19 Vaccine (2 - Pfizer series) 10/13/2021    Shingles vaccine (1 of 2) Never done    Flu vaccine (Season Ended) 08/01/2023    Depression Monitoring  01/12/2024    Breast cancer screen  06/20/2024    Colorectal Cancer Screen  03/08/2026    Lipids  10/11/2027    DTaP/Tdap/Td vaccine (2 - Td or Tdap) 11/29/2028    Hepatitis C screen  Completed    HIV screen  Completed    Hepatitis A vaccine  Aged Out    Hib vaccine  Aged Out    Meningococcal (ACWY) vaccine  Aged Out    Pneumococcal 0-64 years Vaccine  Aged Out             (applicable per patient's age: Cancer Screenings, Depression Screening, Fall Risk Screening, Immunizations)    Hemoglobin A1C (%)   Date Value   03/03/2023 5.1     LDL Cholesterol (mg/dL)   Date Value   10/11/2022 116     AST (U/L)   Date Value   10/11/2022 17     ALT (U/L)   Date Value   10/11/2022 11     BUN (mg/dL)   Date Value   10/11/2022 6      (goal A1C is < 7)   (goal LDL is <100) need 30-50% reduction from baseline     BP Readings from Last 3 Encounters:   04/26/23 126/80   03/03/23 128/80   01/12/23 136/74    (goal /80)      All Future Testing planned in [facility]:  Lab Frequency Next Occurrence   MRI KNEE LEFT WO CONTRAST Once 01/13/2023       Next Visit Date:  No future appointments.          Patient Active Problem List:     Anxiety     Insomnia     HTN (hypertension)     Venous insufficiency     Overweight

## 2023-06-19 RX ORDER — CITALOPRAM 20 MG/1
TABLET ORAL
Qty: 30 TABLET | Refills: 0 | OUTPATIENT
Start: 2023-06-19

## 2023-06-19 NOTE — TELEPHONE ENCOUNTER
Last OV: 4/26/2023   chronic   Last RX:    Next scheduled apt: Visit date not found              Surescript requesting a refill

## 2023-06-20 ENCOUNTER — OFFICE VISIT (OUTPATIENT)
Dept: FAMILY MEDICINE CLINIC | Age: 53
End: 2023-06-20
Payer: COMMERCIAL

## 2023-06-20 VITALS
HEIGHT: 68 IN | DIASTOLIC BLOOD PRESSURE: 68 MMHG | BODY MASS INDEX: 30.62 KG/M2 | OXYGEN SATURATION: 97 % | WEIGHT: 202 LBS | SYSTOLIC BLOOD PRESSURE: 120 MMHG | HEART RATE: 88 BPM | TEMPERATURE: 98.1 F

## 2023-06-20 DIAGNOSIS — U07.1 COVID-19: Primary | ICD-10-CM

## 2023-06-20 PROCEDURE — 1036F TOBACCO NON-USER: CPT | Performed by: FAMILY MEDICINE

## 2023-06-20 PROCEDURE — 99213 OFFICE O/P EST LOW 20 MIN: CPT | Performed by: FAMILY MEDICINE

## 2023-06-20 PROCEDURE — 3017F COLORECTAL CA SCREEN DOC REV: CPT | Performed by: FAMILY MEDICINE

## 2023-06-20 PROCEDURE — G8417 CALC BMI ABV UP PARAM F/U: HCPCS | Performed by: FAMILY MEDICINE

## 2023-06-20 PROCEDURE — 3074F SYST BP LT 130 MM HG: CPT | Performed by: FAMILY MEDICINE

## 2023-06-20 PROCEDURE — G8427 DOCREV CUR MEDS BY ELIG CLIN: HCPCS | Performed by: FAMILY MEDICINE

## 2023-06-20 PROCEDURE — 3078F DIAST BP <80 MM HG: CPT | Performed by: FAMILY MEDICINE

## 2023-06-20 RX ORDER — PROMETHAZINE HYDROCHLORIDE AND CODEINE PHOSPHATE 6.25; 1 MG/5ML; MG/5ML
5 SYRUP ORAL 4 TIMES DAILY PRN
Qty: 118 ML | Refills: 0 | Status: SHIPPED | OUTPATIENT
Start: 2023-06-20 | End: 2023-06-21

## 2023-06-20 NOTE — PROGRESS NOTES
Ashley Becerra MD   gabapentin (NEURONTIN) 300 MG capsule take 1 capsule by mouth every morning 1 capsule by mouth every AFTERNOON and 2 capsules by mouth every evening at bedtime 10/31/22 4/26/23  Yamil Christensen DO   clobetasol (TEMOVATE) 0.05 % cream apply to affected area twice a day for 30 DAYS then if needed 3/18/21   Historical Provider, MD   estradiol (ESTRACE) 0.1 MG/GM vaginal cream apply ONE PEA SIZE DOLLOP ON EXTERNAL VAGINAL AREA AND SWIPE INTO. ..  (REFER TO PRESCRIPTION NOTES). 3/18/21   Historical Provider, MD   Multiple Vitamins-Minerals (HAIR SKIN AND NAILS FORMULA PO) Take by mouth    Historical Provider, MD   TURMERIC PO Take by mouth    Historical Provider, MD   FOLIC ACID PO Take 8,233 mg by mouth daily     Historical Provider, MD   saline nasal gel (AYR) GEL by Nasal route as needed for Congestion 5/31/19   Yamil Christensen DO   acetaminophen (TYLENOL) 325 MG tablet Take 650 mg by mouth every 6 hours as needed. Historical Provider, MD        Allergies:       Patient has no known allergies. Physical Exam:     Vitals:  /68   Pulse 88   Temp 98.1 °F (36.7 °C)   Ht 5' 8\" (1.727 m)   Wt 202 lb (91.6 kg)   LMP 10/03/2017 (Exact Date)   SpO2 97%   BMI 30.71 kg/m²   Physical Exam  Vitals and nursing note reviewed. Constitutional:       General: She is not in acute distress. Appearance: She is well-developed. HENT:      Head: Normocephalic and atraumatic. Right Ear: Tympanic membrane and ear canal normal.      Left Ear: Tympanic membrane and ear canal normal.      Nose: Nose normal.      Mouth/Throat:      Mouth: Mucous membranes are moist.      Pharynx: Oropharynx is clear. Eyes:      Conjunctiva/sclera: Conjunctivae normal.   Cardiovascular:      Rate and Rhythm: Normal rate and regular rhythm. Heart sounds: Normal heart sounds. Pulmonary:      Effort: Pulmonary effort is normal.      Breath sounds: Wheezing present. No rales.    Musculoskeletal:

## 2023-06-21 RX ORDER — ALBUTEROL SULFATE 90 UG/1
2 AEROSOL, METERED RESPIRATORY (INHALATION) EVERY 4 HOURS PRN
Qty: 18 G | Refills: 0 | Status: SHIPPED | OUTPATIENT
Start: 2023-06-21

## 2023-06-21 RX ORDER — BENZONATATE 200 MG/1
200 CAPSULE ORAL 3 TIMES DAILY PRN
Qty: 20 CAPSULE | Refills: 0 | Status: SHIPPED | OUTPATIENT
Start: 2023-06-21

## 2023-06-22 RX ORDER — PANTOPRAZOLE SODIUM 40 MG/1
TABLET, DELAYED RELEASE ORAL
Qty: 30 TABLET | Refills: 5 | Status: SHIPPED | OUTPATIENT
Start: 2023-06-22

## 2023-06-22 NOTE — TELEPHONE ENCOUNTER
Last OV: 6/20/2023   Last RX:    Next scheduled apt: Visit date not found              Surescript requesting a refill

## 2023-07-18 DIAGNOSIS — G93.32 CHRONIC FATIGUE SYNDROME: ICD-10-CM

## 2023-07-19 RX ORDER — GABAPENTIN 300 MG/1
CAPSULE ORAL
Qty: 120 CAPSULE | Refills: 2 | Status: SHIPPED | OUTPATIENT
Start: 2023-07-19 | End: 2023-10-19

## 2023-07-19 RX ORDER — DEXTROAMPHETAMINE SACCHARATE, AMPHETAMINE ASPARTATE MONOHYDRATE, DEXTROAMPHETAMINE SULFATE AND AMPHETAMINE SULFATE 3.75; 3.75; 3.75; 3.75 MG/1; MG/1; MG/1; MG/1
CAPSULE, EXTENDED RELEASE ORAL
Qty: 30 CAPSULE | Refills: 0 | Status: SHIPPED | OUTPATIENT
Start: 2023-07-19 | End: 2023-08-19

## 2023-07-19 NOTE — PROGRESS NOTES
FOLLOW UP APPOINTMENT: VIRTUAL VISIT         Kenda Severin, MD    2021       Alexei Mata is a 52 y.o. female, who came for a  follow up to discuss treatment options    Cause of the symptom(s): right trochanteric bursitis, tendinitis     Chronic     Prior reatment done: physical therapy, injection, anti-inflammatory medication and muscle relaxant    Current pain level: 10 on the scale of 0-10 ( 10 being worst) with walking.      Quality of Symptoms: aching, throbbing, numbness and tingling    Aggravating factors: activity    Relieving factors: rest, lying down, bending and use of medication        Allergies   Allergen Reactions    Vicodin [Hydrocodone-Acetaminophen] Nausea And Vomiting       Social History     Socioeconomic History    Marital status:      Spouse name: Not on file    Number of children: Not on file    Years of education: Not on file    Highest education level: Not on file   Occupational History    Not on file   Social Needs    Financial resource strain: Not on file    Food insecurity     Worry: Not on file     Inability: Not on file    Transportation needs     Medical: Not on file     Non-medical: Not on file   Tobacco Use    Smoking status: Former Smoker     Years: 6.00     Quit date: 2001     Years since quittin.9    Smokeless tobacco: Never Used   Substance and Sexual Activity    Alcohol use: No    Drug use: No    Sexual activity: Yes     Partners: Male   Lifestyle    Physical activity     Days per week: Not on file     Minutes per session: Not on file    Stress: Not on file   Relationships    Social connections     Talks on phone: Not on file     Gets together: Not on file     Attends Anabaptism service: Not on file     Active member of club or organization: Not on file     Attends meetings of clubs or organizations: Not on file     Relationship status: Not on file    Intimate partner violence     Fear of current or ex partner: Not on file Respiratory at bedside with provider, pt placed on bipap.       Abhi Crystal RN  07/19/23 4113 Emotionally abused: Not on file     Physically abused: Not on file     Forced sexual activity: Not on file   Other Topics Concern    Not on file   Social History Narrative    Not on file       Past Medical History:   Diagnosis Date    Anxiety     Arthritis     Asthma     as child    Depression     Hypertension     Osteoarthritis     Ulcer     stomach    Wears glasses        Past Surgical History:   Procedure Laterality Date     SECTION      CHOLECYSTECTOMY, LAPAROSCOPIC N/A 2017    CHOLECYSTECTOMY LAPAROSCOPIC; photos performed by Radha Sagastume MD at [address] EXTRACTION         Family History   Problem Relation Age of Onset    Diabetes Mother     Cancer Mother         Liver Cancer    Heart Disease Father         Prior to Visit Medications    Medication Sig Taking? Authorizing Provider   omeprazole (PRILOSEC) 40 MG delayed release capsule take 1 capsule by mouth every morning before breakfast  Cynthia Lugo MD   lamoTRIgine (LAMICTAL) 25 MG tablet Take 1 tablet by mouth 2 times daily  Kevin Baez, DO   citalopram (CELEXA) 40 MG tablet take 1 tablet by mouth once daily  Kevin Salcedoax, DO   buPROPion (WELLBUTRIN XL) 150 MG extended release tablet take 1 tablet by mouth every morning  Kevin Salcedoax, DO   gabapentin (NEURONTIN) 300 MG capsule Take 1 capsule by mouth 3 times daily for 90 days. Kevin Salcedoax, DO   diclofenac sodium (VOLTAREN) 1 % GEL Apply topically 2 times daily  Historical Provider, MD   Multiple Vitamins-Minerals (HAIR SKIN AND NAILS FORMULA PO) Take by mouth  Historical Provider, MD   TURMERIC PO Take by mouth  Historical Provider, MD   lidocaine (LIDODERM) 5 % Place 1 patch onto the skin daily 12 hours on, 12 hours off.   Kevin Salcedoax, DO   FOLIC ACID PO Take 1,220 mg by mouth daily   Historical Provider, MD   fluticasone (FLONASE) 50 MCG/ACT nasal spray 1 spray by Nasal route daily for 7 days Dispose of after course is completed. Sheron Lamas, APRN - CNP   saline nasal gel (AYR) GEL by Nasal route as needed for Congestion  Alejandro SantanaDO   Vitamin D-Vitamin K (DOSOQUIN) 5500-200 UNIT-MCG TABS take 1 tablet by mouth daily  Historical Provider, MD   acetaminophen (TYLENOL) 325 MG tablet Take 650 mg by mouth every 6 hours as needed. Historical Provider, MD       Review of Systems : All systems reviewed, all unremarkable other than HPI/subjective. No change since last visit. Denies  fever, chills, infection or non healing wound. LMP 10/03/2017 (Exact Date)       Physical Exam  HENT:      Head: Atraumatic. Pulmonary:      Breath sounds: Normal breath sounds. Musculoskeletal:      Comments: Positive tenderness over the right greater trochanter   Neurological:      Mental Status: She is alert and oriented to person, place, and time. Psychiatric:         Behavior: Behavior normal.       Assessment and Plan:      Diagnosis Orders   1. Lumbar radiculopathy  OR NJX DX/THER SBST INTRLMNR LMBR/SAC W/IMG GDN     MRI of the LS spine reviewed  - noted with L5-S1 facet arthritis, moderate to severe. ( 2019)     Continue tramadol 50 mg TID prn. Schedule a L4-5 right interlaminar epidural injection. Schedule injection in 2 weeks     Schedule ff up post injection virtually 2 weeks post op     Stephanie Guerra, was evaluated through a synchronous (real-time) audio-video encounter. The patient (or guardian if applicable) is aware that this is a billable service. Verbal consent to proceed has been obtained within the past 12 months. The visit was conducted pursuant to the emergency declaration under the [facility], [address] [place] [address] authority and the [employer] General Act. Patient identification was verified, and a caregiver was present when appropriate.  The patient was located in a state where the provider was credentialed to provide care. Total time spent for this encounter: Not billed by time    --Remberto Mcneill MD on 4/9/2021 at 2:02 PM    An electronic signature was used to authenticate this note.

## 2023-08-14 RX ORDER — BUPROPION HYDROCHLORIDE 150 MG/1
150 TABLET ORAL EVERY MORNING
Qty: 90 TABLET | Refills: 1 | Status: SHIPPED | OUTPATIENT
Start: 2023-08-14

## 2023-08-25 RX ORDER — CITALOPRAM 20 MG/1
20 TABLET ORAL DAILY
Qty: 90 TABLET | Refills: 1 | Status: SHIPPED | OUTPATIENT
Start: 2023-08-25

## 2023-08-25 NOTE — TELEPHONE ENCOUNTER
Patient is asking for a refill on Celexa 20 mg. Patient last seen 6/20/23. [address] Maintenance   Topic Date Due    Cervical cancer screen  Never done    COVID-19 Vaccine (2 - Pfizer series) 11/17/2021    Shingles vaccine (1 of 2) Never done    Flu vaccine (1) Never done    Depression Monitoring  01/12/2024    Breast cancer screen  06/20/2024    Colorectal Cancer Screen  03/08/2026    Lipids  10/11/2027    DTaP/Tdap/Td vaccine (2 - Td or Tdap) 11/29/2028    Hepatitis C screen  Completed    HIV screen  Completed    Hepatitis A vaccine  Aged Out    Hib vaccine  Aged Out    Meningococcal (ACWY) vaccine  Aged Out    Pneumococcal 0-64 years Vaccine  Aged Out             (applicable per patient's age: Cancer Screenings, Depression Screening, Fall Risk Screening, Immunizations)    Hemoglobin A1C (%)   Date Value   03/03/2023 5.1     LDL Cholesterol (mg/dL)   Date Value   10/11/2022 116     AST (U/L)   Date Value   10/11/2022 17     ALT (U/L)   Date Value   10/11/2022 11     BUN (mg/dL)   Date Value   10/11/2022 6      (goal A1C is < 7)   (goal LDL is <100) need 30-50% reduction from baseline     BP Readings from Last 3 Encounters:   06/20/23 120/68   04/26/23 126/80   03/03/23 128/80    (goal /80)      All Future Testing planned in [facility]:  Lab Frequency Next Occurrence   MRI KNEE LEFT WO CONTRAST Once 01/13/2023       Next Visit Date:  No future appointments.          Patient Active Problem List:     Anxiety     Insomnia     HTN (hypertension)     Venous insufficiency     Overweight

## 2023-09-07 DIAGNOSIS — G93.32 CHRONIC FATIGUE SYNDROME: ICD-10-CM

## 2023-09-08 RX ORDER — DEXTROAMPHETAMINE SACCHARATE, AMPHETAMINE ASPARTATE MONOHYDRATE, DEXTROAMPHETAMINE SULFATE AND AMPHETAMINE SULFATE 3.75; 3.75; 3.75; 3.75 MG/1; MG/1; MG/1; MG/1
CAPSULE, EXTENDED RELEASE ORAL
Qty: 30 CAPSULE | Refills: 0 | Status: SHIPPED | OUTPATIENT
Start: 2023-09-08 | End: 2023-10-08

## 2023-09-08 NOTE — TELEPHONE ENCOUNTER
Last OV: 6/20/2023 04/26/23 chronic   Last RX:     Next scheduled apt: Visit date not found            Surescript requesting a refill

## 2023-09-13 RX ORDER — MAGNESIUM OXIDE 400 MG/1
1 TABLET ORAL
Qty: 30 TABLET | Refills: 5 | Status: SHIPPED | OUTPATIENT
Start: 2023-09-13

## 2023-09-13 NOTE — TELEPHONE ENCOUNTER
Last visit:  6/20/2023  Next Visit Date:  No future appointments. Medication List:  Prior to Admission medications    Medication Sig Start Date End Date Taking? Authorizing Provider   amphetamine-dextroamphetamine (ADDERALL XR) 15 MG extended release capsule take 1 capsule by mouth once daily 9/8/23 10/8/23  Sary Chris, DO   citalopram (CELEXA) 20 MG tablet Take 1 tablet by mouth daily 8/25/23   Sary Chris, DO   buPROPion (WELLBUTRIN XL) 150 MG extended release tablet take 1 tablet by mouth every morning 8/14/23   Sary Chris, DO   gabapentin (NEURONTIN) 300 MG capsule take 1 capsule by mouth every morning then 1 capsule every IN THE AFTERNOON and 2 capsules at bedtime 7/19/23 10/19/23  Sary Chris, DO   pantoprazole (PROTONIX) 40 MG tablet take 1 tablet by mouth every morning before breakfast 6/22/23   Sary Chris, DO   benzonatate (TESSALON) 200 MG capsule Take 1 capsule by mouth 3 times daily as needed for Cough 6/21/23   Sary Chris, DO   albuterol sulfate HFA (VENTOLIN HFA) 108 (90 Base) MCG/ACT inhaler Inhale 2 puffs into the lungs every 4 hours as needed for Wheezing or Shortness of Breath 6/21/23   Sary Chris, DO   lamoTRIgine (LAMICTAL) 25 MG tablet take 1 tablet by mouth twice a day 3/27/23   Sary Chris, DO   Magnesium Oxide, Antacid, 400 MG TABS take 1 tablet by mouth at bedtime 3/20/23   Sary Chris, DO   ibuprofen (ADVIL;MOTRIN) 800 MG tablet Take 1 tablet by mouth every 8 hours as needed for Pain 1/1/23   Carry MD Bessie   clobetasol (TEMOVATE) 0.05 % cream apply to affected area twice a day for 30 DAYS then if needed 3/18/21   Historical Provider, MD   estradiol (ESTRACE) 0.1 MG/GM vaginal cream apply ONE PEA SIZE DOLLOP ON EXTERNAL VAGINAL AREA AND SWIPE INTO. ..  (REFER TO PRESCRIPTION NOTES).  3/18/21   Historical Provider, MD   Multiple Vitamins-Minerals (HAIR SKIN AND NAILS FORMULA PO) Take by mouth    Historical Provider, MD

## 2023-11-28 ENCOUNTER — TELEPHONE (OUTPATIENT)
Dept: FAMILY MEDICINE CLINIC | Age: 53
End: 2023-11-28

## 2023-11-28 NOTE — TELEPHONE ENCOUNTER
Patient has an appt on 11/29/23 and her co-pay in $100. She spoke with her insurance and if it is a virtual visit she will not have a co-pay. Can her appt get switched to VV? Patient states she is coming in get get med refills. Last seen in office 6/20/23 for acute visit.       Health Maintenance   Topic Date Due    Hepatitis B vaccine (1 of 3 - 3-dose series) Never done    Cervical cancer screen  Never done    Shingles vaccine (1 of 2) Never done    Flu vaccine (1) Never done    COVID-19 Vaccine (2 - 2023-24 season) 09/01/2023    Depression Monitoring  01/12/2024    Breast cancer screen  06/20/2024    Colorectal Cancer Screen  03/08/2026    Lipids  10/11/2027    DTaP/Tdap/Td vaccine (2 - Td or Tdap) 11/29/2028    Hepatitis C screen  Completed    HIV screen  Completed    Hepatitis A vaccine  Aged Out    Hib vaccine  Aged Out    Meningococcal (ACWY) vaccine  Aged Out    Pneumococcal 0-64 years Vaccine  Aged Out             (applicable per patient's age: Cancer Screenings, Depression Screening, Fall Risk Screening, Immunizations)    Hemoglobin A1C (%)   Date Value   03/03/2023 5.1     LDL Cholesterol (mg/dL)   Date Value   10/11/2022 116     AST (U/L)   Date Value   10/11/2022 17     ALT (U/L)   Date Value   10/11/2022 11     BUN (mg/dL)   Date Value   10/11/2022 6      (goal A1C is < 7)   (goal LDL is <100) need 30-50% reduction from baseline     BP Readings from Last 3 Encounters:   06/20/23 120/68   04/26/23 126/80   03/03/23 128/80    (goal /80)      All Future Testing planned in [facility]:  Lab Frequency Next Occurrence   MRI KNEE LEFT WO CONTRAST Once 01/13/2023       Next Visit Date:  Future Appointments   Date Time Provider [address]   11/29/2023  8:20 AM DO Tonny Trujillo [facility]            Patient Active Problem List:     Anxiety     Insomnia     HTN (hypertension)     Venous insufficiency     Overweight

## 2023-11-29 ENCOUNTER — TELEMEDICINE (OUTPATIENT)
Dept: FAMILY MEDICINE CLINIC | Age: 53
End: 2023-11-29
Payer: COMMERCIAL

## 2023-11-29 DIAGNOSIS — I83.813 VARICOSE VEINS OF BILATERAL LOWER EXTREMITIES WITH PAIN: Primary | ICD-10-CM

## 2023-11-29 DIAGNOSIS — M79.2 NEUROPATHIC PAIN: ICD-10-CM

## 2023-11-29 DIAGNOSIS — G93.32 CHRONIC FATIGUE SYNDROME: ICD-10-CM

## 2023-11-29 DIAGNOSIS — F41.9 ANXIETY AND DEPRESSION: ICD-10-CM

## 2023-11-29 DIAGNOSIS — F32.A ANXIETY AND DEPRESSION: ICD-10-CM

## 2023-11-29 PROCEDURE — 99214 OFFICE O/P EST MOD 30 MIN: CPT | Performed by: FAMILY MEDICINE

## 2023-11-29 RX ORDER — LAMOTRIGINE 25 MG/1
25 TABLET ORAL 2 TIMES DAILY
Qty: 60 TABLET | Refills: 5 | Status: SHIPPED | OUTPATIENT
Start: 2023-11-29

## 2023-11-29 RX ORDER — GABAPENTIN 300 MG/1
CAPSULE ORAL
Qty: 120 CAPSULE | Refills: 2 | Status: SHIPPED | OUTPATIENT
Start: 2023-11-29 | End: 2024-04-10

## 2023-11-29 RX ORDER — DEXTROAMPHETAMINE SACCHARATE, AMPHETAMINE ASPARTATE MONOHYDRATE, DEXTROAMPHETAMINE SULFATE AND AMPHETAMINE SULFATE 3.75; 3.75; 3.75; 3.75 MG/1; MG/1; MG/1; MG/1
1 CAPSULE, EXTENDED RELEASE ORAL DAILY
Qty: 30 CAPSULE | Refills: 0 | Status: SHIPPED | OUTPATIENT
Start: 2023-11-29 | End: 2023-12-29

## 2023-11-29 ASSESSMENT — PATIENT HEALTH QUESTIONNAIRE - PHQ9
10. IF YOU CHECKED OFF ANY PROBLEMS, HOW DIFFICULT HAVE THESE PROBLEMS MADE IT FOR YOU TO DO YOUR WORK, TAKE CARE OF THINGS AT HOME, OR GET ALONG WITH OTHER PEOPLE: 0
SUM OF ALL RESPONSES TO PHQ QUESTIONS 1-9: 1
SUM OF ALL RESPONSES TO PHQ QUESTIONS 1-9: 1
7. TROUBLE CONCENTRATING ON THINGS, SUCH AS READING THE NEWSPAPER OR WATCHING TELEVISION: 0
5. POOR APPETITE OR OVEREATING: 0
9. THOUGHTS THAT YOU WOULD BE BETTER OFF DEAD, OR OF HURTING YOURSELF: 0
SUM OF ALL RESPONSES TO PHQ QUESTIONS 1-9: 1
4. FEELING TIRED OR HAVING LITTLE ENERGY: 0
8. MOVING OR SPEAKING SO SLOWLY THAT OTHER PEOPLE COULD HAVE NOTICED. OR THE OPPOSITE, BEING SO FIGETY OR RESTLESS THAT YOU HAVE BEEN MOVING AROUND A LOT MORE THAN USUAL: 0
3. TROUBLE FALLING OR STAYING ASLEEP: 1
1. LITTLE INTEREST OR PLEASURE IN DOING THINGS: 0
6. FEELING BAD ABOUT YOURSELF - OR THAT YOU ARE A FAILURE OR HAVE LET YOURSELF OR YOUR FAMILY DOWN: 0
SUM OF ALL RESPONSES TO PHQ9 QUESTIONS 1 & 2: 0
SUM OF ALL RESPONSES TO PHQ QUESTIONS 1-9: 1
2. FEELING DOWN, DEPRESSED OR HOPELESS: 0

## 2023-11-29 NOTE — PATIENT INSTRUCTIONS
[employer] SURVEY:    You may be receiving a survey from "[employer]" regarding your visit today. You may get this in the mail, through your MyChart or in your email. Please complete the survey to enable us to provide the highest quality of care to you and your family. If you cannot score us as very good ( 5 Stars) on any question, please feel free to call the office to discuss how we could have made your experience exceptional.     Thank you.     Clinical Care Team:   Dr. Ronald Tony, [address] [facility], [address]                                     Triage: Linette Betancourt, [address] [facility] Team:    [facility]

## 2023-11-29 NOTE — PROGRESS NOTES
when appropriate. The patient was located at Home: [address]  Provider was located at [facility] (Appt Dept): [address] [facility],  [address] [place] [address]       Total time spent for this encounter: Not billed by time    --Subha Ricardo DO on 11/30/2023 at 10:42 PM    An electronic signature was used to authenticate this note.

## 2023-12-10 ENCOUNTER — HOSPITAL ENCOUNTER (EMERGENCY)
Age: 53
Discharge: HOME | End: 2023-12-10
Attending: EMERGENCY MEDICINE
Payer: COMMERCIAL

## 2023-12-10 VITALS
OXYGEN SATURATION: 99 % | HEIGHT: 68 IN | SYSTOLIC BLOOD PRESSURE: 105 MMHG | HEART RATE: 86 BPM | BODY MASS INDEX: 28.28 KG/M2 | DIASTOLIC BLOOD PRESSURE: 94 MMHG | WEIGHT: 186.6 LBS | TEMPERATURE: 98.9 F | RESPIRATION RATE: 18 BRPM

## 2023-12-10 DIAGNOSIS — J01.00 ACUTE MAXILLARY SINUSITIS, RECURRENCE NOT SPECIFIED: Primary | ICD-10-CM

## 2023-12-10 LAB
FLUAV AG SPEC QL: NEGATIVE
FLUBV AG SPEC QL: NEGATIVE
SARS-COV-2 RDRP RESP QL NAA+PROBE: NOT DETECTED
SPECIMEN DESCRIPTION: NORMAL

## 2023-12-10 PROCEDURE — 87804 INFLUENZA ASSAY W/OPTIC: CPT

## 2023-12-10 PROCEDURE — 99283 EMERGENCY DEPT VISIT LOW MDM: CPT

## 2023-12-10 PROCEDURE — C9803 HOPD COVID-19 SPEC COLLECT: HCPCS

## 2023-12-10 PROCEDURE — 87635 SARS-COV-2 COVID-19 AMP PRB: CPT

## 2023-12-10 RX ORDER — AMOXICILLIN 500 MG/1
500 CAPSULE ORAL 3 TIMES DAILY
Qty: 21 CAPSULE | Refills: 0 | Status: SHIPPED | OUTPATIENT
Start: 2023-12-10 | End: 2023-12-17

## 2023-12-10 RX ORDER — PREDNISONE 20 MG/1
40 TABLET ORAL DAILY
Qty: 10 TABLET | Refills: 0 | Status: SHIPPED | OUTPATIENT
Start: 2023-12-10 | End: 2023-12-15

## 2023-12-10 RX ORDER — ECHINACEA PURPUREA EXTRACT 125 MG
1 TABLET ORAL PRN
Qty: 1 EACH | Refills: 0 | Status: SHIPPED | OUTPATIENT
Start: 2023-12-10

## 2023-12-10 ASSESSMENT — PAIN DESCRIPTION - FREQUENCY: FREQUENCY: INTERMITTENT

## 2023-12-10 ASSESSMENT — PAIN DESCRIPTION - DESCRIPTORS: DESCRIPTORS: PRESSURE

## 2023-12-10 ASSESSMENT — PAIN - FUNCTIONAL ASSESSMENT: PAIN_FUNCTIONAL_ASSESSMENT: 0-10

## 2023-12-10 ASSESSMENT — LIFESTYLE VARIABLES
HOW OFTEN DO YOU HAVE A DRINK CONTAINING ALCOHOL: NEVER
HOW MANY STANDARD DRINKS CONTAINING ALCOHOL DO YOU HAVE ON A TYPICAL DAY: PATIENT DOES NOT DRINK

## 2023-12-10 ASSESSMENT — PAIN DESCRIPTION - PAIN TYPE: TYPE: ACUTE PAIN

## 2023-12-10 ASSESSMENT — PAIN SCALES - GENERAL: PAINLEVEL_OUTOF10: 3

## 2023-12-10 ASSESSMENT — PAIN DESCRIPTION - LOCATION: LOCATION: HEAD

## 2023-12-10 NOTE — ED PROVIDER NOTES
eMERGENCY dEPARTMENT eNCOUnter        [address]    Chief Complaint   Patient presents with    Headache     Patient is congested with a lot of sinus pressure x 4-5 days. Have had several nose bleed (one time had it running out, now has it when she blows nose. Cough and light headed began yesterday. Patient taking mucinex       HPI    Colin Milner is a 48 y.o. female who presents to ED with cough and sinus congestion for 4-5 days. Pt presents with nonproductive cough.    REVIEW OF SYSTEMS    All systems reviewed and positives are in the HPI      PAST MEDICAL HISTORY    Past Medical History:   Diagnosis Date    Anxiety     Arthritis     Asthma     as child    Depression     Osteoarthritis     Ulcer     stomach    Wears glasses        SURGICAL HISTORY    Past Surgical History:   Procedure Laterality Date     SECTION      CHOLECYSTECTOMY      CHOLECYSTECTOMY, LAPAROSCOPIC N/A 2017    CHOLECYSTECTOMY LAPAROSCOPIC; photos performed by Daniela Dunbar MD at [address] Right 2021    L 4-5 RIGHT INTERLAMINAR EPIDURAL INJECTION-  IMAGING GUIDANCE performed by Chang Shrestha MD at [address] EXTRACTION         CURRENT MEDICATIONS    Current Outpatient Rx   Medication Sig Dispense Refill    predniSONE (DELTASONE) 20 MG tablet Take 2 tablets by mouth daily for 5 doses 10 tablet 0    amoxicillin (AMOXIL) 500 MG capsule Take 1 capsule by mouth 3 times daily for 7 days 21 capsule 0    sodium chloride (OCEAN) 0.65 % nasal spray 1 spray by Nasal route as needed for Congestion 1 each 0    lamoTRIgine (LAMICTAL) 25 MG tablet Take 1 tablet by mouth 2 times daily 60 tablet 5    gabapentin (NEURONTIN) 300 MG capsule take 1 capsule by mouth every morning then 1 capsule every IN THE AFTERNOON and 2 capsules at bedtime 120 capsule 2    amphetamine-dextroamphetamine (ADDERALL XR) 15 MG extended release capsule Take 1

## 2024-01-08 DIAGNOSIS — G93.32 CHRONIC FATIGUE SYNDROME: ICD-10-CM

## 2024-01-08 RX ORDER — DEXTROAMPHETAMINE SACCHARATE, AMPHETAMINE ASPARTATE MONOHYDRATE, DEXTROAMPHETAMINE SULFATE AND AMPHETAMINE SULFATE 3.75; 3.75; 3.75; 3.75 MG/1; MG/1; MG/1; MG/1
1 CAPSULE, EXTENDED RELEASE ORAL DAILY
Qty: 30 CAPSULE | Refills: 0 | Status: SHIPPED | OUTPATIENT
Start: 2024-01-08 | End: 2024-02-07

## 2024-01-08 NOTE — TELEPHONE ENCOUNTER
Adderall 15 mg    [facility]   Topic Date Due    Hepatitis B vaccine (1 of 3 - 3-dose series) Never done    Cervical cancer screen  Never done    Shingles vaccine (1 of 2) Never done    Flu vaccine (1) Never done    COVID-19 Vaccine (2 - 2023-24 season) 09/01/2023    Breast cancer screen  06/20/2024    Depression Monitoring  11/29/2024    Colorectal Cancer Screen  03/08/2026    Lipids  10/11/2027    DTaP/Tdap/Td vaccine (2 - Td or Tdap) 11/29/2028    Hepatitis C screen  Completed    HIV screen  Completed    Hepatitis A vaccine  Aged Out    Hib vaccine  Aged Out    Polio vaccine  Aged Out    Meningococcal (ACWY) vaccine  Aged Out    Pneumococcal 0-64 years Vaccine  Aged Out             (applicable per patient's age: Cancer Screenings, Depression Screening, Fall Risk Screening, Immunizations)    Hemoglobin A1C (%)   Date Value   03/03/2023 5.1     LDL Cholesterol (mg/dL)   Date Value   10/11/2022 116     AST (U/L)   Date Value   10/11/2022 17     ALT (U/L)   Date Value   10/11/2022 11     BUN (mg/dL)   Date Value   10/11/2022 6      (goal A1C is < 7)   (goal LDL is <100) need 30-50% reduction from baseline     BP Readings from Last 3 Encounters:   12/10/23 (!) 105/94   06/20/23 120/68   04/26/23 126/80    (goal /80)      All Future Testing planned in [facility]:  Lab Frequency Next Occurrence   MRI KNEE LEFT WO CONTRAST Once 01/13/2023       Next Visit Date:  No future appointments.         Patient Active Problem List:     Anxiety     Insomnia     HTN (hypertension)     Venous insufficiency     Overweight

## 2024-01-08 NOTE — TELEPHONE ENCOUNTER
Last visit:  11/29/2023  Next Visit Date:  No future appointments.      Medication List:  Prior to Admission medications    Medication Sig Start Date End Date Taking? Authorizing Provider   sodium chloride (OCEAN) 0.65 % nasal spray 1 spray by Nasal route as needed for Congestion 12/10/23   Noel Manley MD   lamoTRIgine (LAMICTAL) 25 MG tablet Take 1 tablet by mouth 2 times daily 11/29/23   Lindsey Lucero DO   gabapentin (NEURONTIN) 300 MG capsule take 1 capsule by mouth every morning then 1 capsule every IN THE AFTERNOON and 2 capsules at bedtime 11/29/23 4/10/24  Lindsey Lucero DO   amphetamine-dextroamphetamine (ADDERALL XR) 15 MG extended release capsule Take 1 capsule by mouth daily for 30 days. Max Daily Amount: 1 capsule 11/29/23 12/29/23  Lindsey Lucero DO   magnesium oxide (MAG-OX) 400 MG tablet take 1 tablet by mouth at bedtime 9/13/23   Lindsey Lucero DO   citalopram (CELEXA) 20 MG tablet Take 1 tablet by mouth daily 8/25/23   Lindsey Lucero DO   buPROPion (WELLBUTRIN XL) 150 MG extended release tablet take 1 tablet by mouth every morning 8/14/23   Lindsey Lucero DO   ibuprofen (ADVIL;MOTRIN) 800 MG tablet Take 1 tablet by mouth every 8 hours as needed for Pain 1/1/23   Tee Gibbs MD   Multiple Vitamins-Minerals (HAIR SKIN AND NAILS FORMULA PO) Take by mouth    Nora Barroso MD   TURMERIC PO Take by mouth    Nora Barroso MD   FOLIC ACID PO Take 1,000 mg by mouth daily     Nora Barroso MD   saline nasal gel (AYR) GEL by Nasal route as needed for Congestion 5/31/19   Lindsey Lucero DO   acetaminophen (TYLENOL) 325 MG tablet Take 2 tablets by mouth every 6 hours as needed    Nora Barroso MD

## 2024-01-17 ENCOUNTER — APPOINTMENT (OUTPATIENT)
Dept: CT IMAGING | Age: 54
End: 2024-01-17
Payer: COMMERCIAL

## 2024-01-17 ENCOUNTER — HOSPITAL ENCOUNTER (EMERGENCY)
Age: 54
Discharge: HOME | End: 2024-01-17
Attending: FAMILY MEDICINE
Payer: COMMERCIAL

## 2024-01-17 VITALS
WEIGHT: 194 LBS | SYSTOLIC BLOOD PRESSURE: 146 MMHG | RESPIRATION RATE: 12 BRPM | HEART RATE: 69 BPM | DIASTOLIC BLOOD PRESSURE: 77 MMHG | HEIGHT: 68 IN | BODY MASS INDEX: 29.4 KG/M2 | OXYGEN SATURATION: 99 % | TEMPERATURE: 98.2 F

## 2024-01-17 DIAGNOSIS — R55 SYNCOPE AND COLLAPSE: Primary | ICD-10-CM

## 2024-01-17 DIAGNOSIS — S00.83XA CONTUSION OF FACE, INITIAL ENCOUNTER: ICD-10-CM

## 2024-01-17 DIAGNOSIS — R70.0 ELEVATED ERYTHROCYTE SEDIMENTATION RATE: ICD-10-CM

## 2024-01-17 LAB
ALBUMIN SERPL-MCNC: 4.3 G/DL (ref 3.5–5.2)
ALP SERPL-CCNC: 100 U/L (ref 35–104)
ALT SERPL-CCNC: 12 U/L (ref 5–33)
ANION GAP SERPL CALCULATED.3IONS-SCNC: 12 MMOL/L (ref 9–17)
AST SERPL-CCNC: 19 U/L
BASOPHILS # BLD: 0.03 K/UL (ref 0–0.2)
BASOPHILS NFR BLD: 0 % (ref 0–2)
BILIRUB SERPL-MCNC: 0.4 MG/DL (ref 0.3–1.2)
BUN SERPL-MCNC: 13 MG/DL (ref 6–20)
BUN/CREAT SERPL: 16 (ref 9–20)
CALCIUM SERPL-MCNC: 9.3 MG/DL (ref 8.6–10.4)
CHLORIDE SERPL-SCNC: 100 MMOL/L (ref 98–107)
CO2 SERPL-SCNC: 25 MMOL/L (ref 20–31)
CREAT SERPL-MCNC: 0.8 MG/DL (ref 0.5–0.9)
CRP SERPL HS-MCNC: 3.9 MG/L (ref 0–5)
EOSINOPHIL # BLD: 0.05 K/UL (ref 0–0.4)
EOSINOPHILS RELATIVE PERCENT: 1 % (ref 0–5)
ERYTHROCYTE [DISTWIDTH] IN BLOOD BY AUTOMATED COUNT: 13.7 % (ref 12.1–15.2)
ERYTHROCYTE [SEDIMENTATION RATE] IN BLOOD BY PHOTOMETRIC METHOD: 32 MM/HR (ref 0–30)
GFR SERPL CREATININE-BSD FRML MDRD: >60 ML/MIN/1.73M2
GLUCOSE SERPL-MCNC: 90 MG/DL (ref 70–99)
HCT VFR BLD AUTO: 36.7 % (ref 36–46)
HGB BLD-MCNC: 12.1 G/DL (ref 12–16)
IMM GRANULOCYTES # BLD AUTO: 0.01 K/UL (ref 0–0.3)
IMM GRANULOCYTES NFR BLD: 0 % (ref 0–5)
LYMPHOCYTES NFR BLD: 2.45 K/UL (ref 1–4.8)
LYMPHOCYTES RELATIVE PERCENT: 36 % (ref 15–40)
MCH RBC QN AUTO: 27.8 PG (ref 26–34)
MCHC RBC AUTO-ENTMCNC: 33 G/DL (ref 31–37)
MCV RBC AUTO: 84.2 FL (ref 80–100)
MONOCYTES NFR BLD: 0.52 K/UL (ref 0–1)
MONOCYTES NFR BLD: 8 % (ref 4–8)
NEUTROPHILS NFR BLD: 55 % (ref 47–75)
NEUTS SEG NFR BLD: 3.78 K/UL (ref 2.5–7)
PLATELET # BLD AUTO: 375 K/UL (ref 140–450)
PMV BLD AUTO: 9.6 FL (ref 6–12)
POTASSIUM SERPL-SCNC: 3.7 MMOL/L (ref 3.7–5.3)
PROT SERPL-MCNC: 7 G/DL (ref 6.4–8.3)
RBC # BLD AUTO: 4.36 M/UL (ref 4–5.2)
SODIUM SERPL-SCNC: 137 MMOL/L (ref 135–144)
WBC OTHER # BLD: 6.8 K/UL (ref 3.5–11)

## 2024-01-17 PROCEDURE — 80053 COMPREHEN METABOLIC PANEL: CPT

## 2024-01-17 PROCEDURE — 99284 EMERGENCY DEPT VISIT MOD MDM: CPT

## 2024-01-17 PROCEDURE — 85025 COMPLETE CBC W/AUTO DIFF WBC: CPT

## 2024-01-17 PROCEDURE — 93005 ELECTROCARDIOGRAM TRACING: CPT | Performed by: FAMILY MEDICINE

## 2024-01-17 PROCEDURE — 86140 C-REACTIVE PROTEIN: CPT

## 2024-01-17 PROCEDURE — 85652 RBC SED RATE AUTOMATED: CPT

## 2024-01-17 PROCEDURE — 70450 CT HEAD/BRAIN W/O DYE: CPT

## 2024-01-17 ASSESSMENT — PAIN DESCRIPTION - DESCRIPTORS: DESCRIPTORS: POUNDING

## 2024-01-17 ASSESSMENT — LIFESTYLE VARIABLES
HOW MANY STANDARD DRINKS CONTAINING ALCOHOL DO YOU HAVE ON A TYPICAL DAY: PATIENT DOES NOT DRINK
HOW OFTEN DO YOU HAVE A DRINK CONTAINING ALCOHOL: NEVER

## 2024-01-17 ASSESSMENT — PAIN DESCRIPTION - LOCATION: LOCATION: HEAD

## 2024-01-17 ASSESSMENT — PAIN SCALES - GENERAL: PAINLEVEL_OUTOF10: 4

## 2024-01-17 ASSESSMENT — PAIN DESCRIPTION - FREQUENCY: FREQUENCY: INTERMITTENT

## 2024-01-17 ASSESSMENT — PAIN DESCRIPTION - PAIN TYPE: TYPE: ACUTE PAIN

## 2024-01-17 NOTE — ED PROVIDER NOTES
[facility]  EMERGENCY DEPARTMENT ENCOUNTER      Pt Name: Irene Galvez  MRN: [id number]  Birthdate 1970  Date of evaluation: 1/17/2024  Provider: Tee Gibbs MD    CHIEF COMPLAINT       Chief Complaint   Patient presents with    Head Injury     Pt reports she was getting up from bed and fell, passing out, hitting head on the bed. Pt reports LOC.Pt reports a few days ago she had vision loss from her left eye but it has resolved. Pt reports she called Dr. Lucero who told her to go to the ED.          HISTORY OF PRESENT ILLNESS      Irene Galvez is a 53 y.o. female who presents to the emergency department via private vehicle, patient states she really was not feeling very well last night before bed, patient states that she got up this morning out of bed, states \"felt something boom\", passed out striking her left cheek on the bed, is unsure how long she was down but states that her boyfriend later told her he heard her and fall down but did not get up and help, she is unsure how long she was on the ground but does not think it was very long and got herself back up, she later went over to get a cup of coffee and was told by her boyfriend that she never actually put a cup under the coffee machine and there was coffee spilled over the place, patient went to work.  Patient states she was still not feeling well so left work and drove herself to the hospital after calling her primary care provider who advised her to come to the ER.  Patient states she is not really been feeling very well the last couple days having sensation of being \"off balance\".  Patient states she still does not quite feel right.  Patient also relates a few days ago she lost vision in her left eye, describing it as \"a portion of something was not there\", called her sister who she states is a nurse, who told her to take some Tylenol and Advil and it was probably a migraine, but did what she was advised and laid down for about

## 2024-01-17 NOTE — ED TRIAGE NOTES
Pt arrives via private vehicle. Home med list updated with pt verbal recall. Pt is alert and oriented x4 during triage.

## 2024-01-17 NOTE — ED NOTES
BALDOMERO Chart : no glasses- right eye 20/70    left eye   both 20/50                        : glasses- right eye 20/20  left eye 20/25  both 20/15

## 2024-01-18 ENCOUNTER — TELEPHONE (OUTPATIENT)
Dept: FAMILY MEDICINE CLINIC | Age: 54
End: 2024-01-18

## 2024-01-18 LAB
EKG ATRIAL RATE: 82 BPM
EKG P AXIS: 73 DEGREES
EKG P-R INTERVAL: 150 MS
EKG Q-T INTERVAL: 398 MS
EKG QRS DURATION: 90 MS
EKG QTC CALCULATION (BAZETT): 464 MS
EKG R AXIS: 67 DEGREES
EKG T AXIS: 70 DEGREES
EKG VENTRICULAR RATE: 82 BPM

## 2024-01-18 PROCEDURE — 93010 ELECTROCARDIOGRAM REPORT: CPT | Performed by: INTERNAL MEDICINE

## 2024-01-18 NOTE — TELEPHONE ENCOUNTER
Patient was in the ED yesterday 1/17/24.  She is calling to see if Dr. Lucero would look at her CT and call her with results.      Health Maintenance   Topic Date Due    Hepatitis B vaccine (1 of 3 - 3-dose series) Never done    Cervical cancer screen  Never done    Shingles vaccine (1 of 2) Never done    Flu vaccine (1) Never done    COVID-19 Vaccine (2 - 2023-24 season) 09/01/2023    Breast cancer screen  06/20/2024    Depression Monitoring  11/29/2024    Colorectal Cancer Screen  03/08/2026    Lipids  10/11/2027    DTaP/Tdap/Td vaccine (2 - Td or Tdap) 11/29/2028    Hepatitis C screen  Completed    HIV screen  Completed    Hepatitis A vaccine  Aged Out    Hib vaccine  Aged Out    Polio vaccine  Aged Out    Meningococcal (ACWY) vaccine  Aged Out    Pneumococcal 0-64 years Vaccine  Aged Out             (applicable per patient's age: Cancer Screenings, Depression Screening, Fall Risk Screening, Immunizations)    Hemoglobin A1C (%)   Date Value   03/03/2023 5.1     LDL Cholesterol (mg/dL)   Date Value   10/11/2022 116     AST (U/L)   Date Value   01/17/2024 19     ALT (U/L)   Date Value   01/17/2024 12     BUN (mg/dL)   Date Value   01/17/2024 13      (goal A1C is < 7)   (goal LDL is <100) need 30-50% reduction from baseline     BP Readings from Last 3 Encounters:   01/17/24 (!) 146/77   12/10/23 (!) 105/94   06/20/23 120/68    (goal /80)      All Future Testing planned in [facility]:  Lab Frequency Next Occurrence       Next Visit Date:  No future appointments.         Patient Active Problem List:     Anxiety     Insomnia     HTN (hypertension)     Venous insufficiency     Overweight

## 2024-01-18 NOTE — TELEPHONE ENCOUNTER
Last visit:  11/29/2023  Next Visit Date:    Future Appointments   Date Time Provider Department Center   1/24/2024  1:40 PM Lindsey Lucero DO [facility]         Medication List:  Prior to Admission medications    Medication Sig Start Date End Date Taking? Authorizing Provider   amphetamine-dextroamphetamine (ADDERALL XR) 15 MG extended release capsule Take 1 capsule by mouth daily for 30 days. Max Daily Amount: 1 capsule 1/8/24 2/7/24  Lindsey Lucero DO   sodium chloride (OCEAN) 0.65 % nasal spray 1 spray by Nasal route as needed for Congestion 12/10/23   Noel Manley MD   lamoTRIgine (LAMICTAL) 25 MG tablet Take 1 tablet by mouth 2 times daily 11/29/23   Lindsey Lucero DO   gabapentin (NEURONTIN) 300 MG capsule take 1 capsule by mouth every morning then 1 capsule every IN THE AFTERNOON and 2 capsules at bedtime 11/29/23 4/10/24  Lindsey Lucero DO   magnesium oxide (MAG-OX) 400 MG tablet take 1 tablet by mouth at bedtime 9/13/23   Lindsey Lucero DO   citalopram (CELEXA) 20 MG tablet Take 1 tablet by mouth daily 8/25/23   Lindsey Lucero DO   buPROPion (WELLBUTRIN XL) 150 MG extended release tablet take 1 tablet by mouth every morning 8/14/23   Lindsey Lucero DO   ibuprofen (ADVIL;MOTRIN) 800 MG tablet Take 1 tablet by mouth every 8 hours as needed for Pain 1/1/23   Tee Gibbs MD   Multiple Vitamins-Minerals (HAIR SKIN AND NAILS FORMULA PO) Take by mouth    Nora Barroso MD   TURMERIC PO Take by mouth    Nora Barroso MD   FOLIC ACID PO Take 1,000 mg by mouth daily     Nora Barroso MD   saline nasal gel (AYR) GEL by Nasal route as needed for Congestion 5/31/19   Lindsey Lucero DO   acetaminophen (TYLENOL) 325 MG tablet Take 2 tablets by mouth every 6 hours as needed    Nora Barroso MD

## 2024-01-24 ENCOUNTER — OFFICE VISIT (OUTPATIENT)
Dept: FAMILY MEDICINE CLINIC | Age: 54
End: 2024-01-24
Payer: COMMERCIAL

## 2024-01-24 VITALS
HEIGHT: 68 IN | WEIGHT: 189 LBS | HEART RATE: 90 BPM | SYSTOLIC BLOOD PRESSURE: 138 MMHG | DIASTOLIC BLOOD PRESSURE: 82 MMHG | OXYGEN SATURATION: 98 % | BODY MASS INDEX: 28.64 KG/M2

## 2024-01-24 DIAGNOSIS — R55 VASOVAGAL SYNCOPE: ICD-10-CM

## 2024-01-24 DIAGNOSIS — M21.372 LEFT FOOT DROP: Primary | ICD-10-CM

## 2024-01-24 DIAGNOSIS — F41.9 ANXIETY: ICD-10-CM

## 2024-01-24 DIAGNOSIS — M79.2 NEUROPATHIC PAIN OF RIGHT FOOT: ICD-10-CM

## 2024-01-24 DIAGNOSIS — G43.109 OCULAR MIGRAINE: ICD-10-CM

## 2024-01-24 PROCEDURE — 3079F DIAST BP 80-89 MM HG: CPT | Performed by: FAMILY MEDICINE

## 2024-01-24 PROCEDURE — 3075F SYST BP GE 130 - 139MM HG: CPT | Performed by: FAMILY MEDICINE

## 2024-01-24 PROCEDURE — 99214 OFFICE O/P EST MOD 30 MIN: CPT | Performed by: FAMILY MEDICINE

## 2024-01-24 RX ORDER — ALPRAZOLAM 0.5 MG/1
0.5 TABLET ORAL 3 TIMES DAILY PRN
Qty: 60 TABLET | Refills: 0 | Status: SHIPPED | OUTPATIENT
Start: 2024-01-24 | End: 2024-02-23

## 2024-01-24 RX ORDER — SODIUM CHLORIDE/ALOE VERA
GEL (GRAM) NASAL PRN
Qty: 1 EACH | Refills: 3 | Status: SHIPPED | OUTPATIENT
Start: 2024-01-24

## 2024-01-24 RX ORDER — FLUTICASONE PROPIONATE 50 MCG
2 SPRAY, SUSPENSION (ML) NASAL DAILY
Qty: 1 EACH | Refills: 1 | Status: SHIPPED | OUTPATIENT
Start: 2024-01-24

## 2024-01-24 ASSESSMENT — PATIENT HEALTH QUESTIONNAIRE - PHQ9
1. LITTLE INTEREST OR PLEASURE IN DOING THINGS: 0
2. FEELING DOWN, DEPRESSED OR HOPELESS: 0
4. FEELING TIRED OR HAVING LITTLE ENERGY: 0
6. FEELING BAD ABOUT YOURSELF - OR THAT YOU ARE A FAILURE OR HAVE LET YOURSELF OR YOUR FAMILY DOWN: 0
7. TROUBLE CONCENTRATING ON THINGS, SUCH AS READING THE NEWSPAPER OR WATCHING TELEVISION: 0
SUM OF ALL RESPONSES TO PHQ QUESTIONS 1-9: 0
8. MOVING OR SPEAKING SO SLOWLY THAT OTHER PEOPLE COULD HAVE NOTICED. OR THE OPPOSITE, BEING SO FIGETY OR RESTLESS THAT YOU HAVE BEEN MOVING AROUND A LOT MORE THAN USUAL: 0
9. THOUGHTS THAT YOU WOULD BE BETTER OFF DEAD, OR OF HURTING YOURSELF: 0
SUM OF ALL RESPONSES TO PHQ9 QUESTIONS 1 & 2: 0
10. IF YOU CHECKED OFF ANY PROBLEMS, HOW DIFFICULT HAVE THESE PROBLEMS MADE IT FOR YOU TO DO YOUR WORK, TAKE CARE OF THINGS AT HOME, OR GET ALONG WITH OTHER PEOPLE: 0
SUM OF ALL RESPONSES TO PHQ QUESTIONS 1-9: 0
5. POOR APPETITE OR OVEREATING: 0
3. TROUBLE FALLING OR STAYING ASLEEP: 0
SUM OF ALL RESPONSES TO PHQ QUESTIONS 1-9: 0
SUM OF ALL RESPONSES TO PHQ QUESTIONS 1-9: 0

## 2024-01-24 NOTE — PROGRESS NOTES
Triage: Zamzam Nguyen CMA              Clerical Team:    Zamzam Westfall        signed by Lindsey Lucero DO on 1/28/2024 at 1:57 PM  [facility]  [address] [address]  Dept: [phone]

## 2024-01-24 NOTE — PATIENT INSTRUCTIONS
[employer] SURVEY:    You may be receiving a survey from [employer] regarding your visit today.    You may get this in the mail, through your MyChart or in your email.     Please complete the survey to enable us to provide the highest quality of care to you and your family.    If you cannot score us as very good ( 5 Stars) on any question, please feel free to call the office to discuss how we could have made your experience exceptional.     Thank you.    Clinical Care Team:   DO Kash Azul CMA                                     Triage: Zamzam Nguyen CMA              Clerical Team:    Zamzam Westfall

## 2024-02-12 RX ORDER — BUPROPION HYDROCHLORIDE 150 MG/1
150 TABLET ORAL EVERY MORNING
Qty: 90 TABLET | Refills: 1 | Status: SHIPPED | OUTPATIENT
Start: 2024-02-12

## 2024-02-12 RX ORDER — CITALOPRAM 20 MG/1
20 TABLET ORAL DAILY
Qty: 90 TABLET | Refills: 1 | Status: SHIPPED | OUTPATIENT
Start: 2024-02-12

## 2024-02-12 NOTE — TELEPHONE ENCOUNTER
Last OV: 1/24/2024  anxiety   Last RX:    Next scheduled apt: Visit date not found           Surescript requesting a refill

## 2024-03-06 DIAGNOSIS — G93.32 CHRONIC FATIGUE SYNDROME: ICD-10-CM

## 2024-03-06 RX ORDER — DEXTROAMPHETAMINE SACCHARATE, AMPHETAMINE ASPARTATE MONOHYDRATE, DEXTROAMPHETAMINE SULFATE AND AMPHETAMINE SULFATE 3.75; 3.75; 3.75; 3.75 MG/1; MG/1; MG/1; MG/1
1 CAPSULE, EXTENDED RELEASE ORAL DAILY
Qty: 30 CAPSULE | Refills: 0 | Status: SHIPPED | OUTPATIENT
Start: 2024-03-06 | End: 2024-04-05

## 2024-03-06 NOTE — TELEPHONE ENCOUNTER
Last visit:  1/24/2024  Next Visit Date:  No future appointments.      Medication List:  Prior to Admission medications    Medication Sig Start Date End Date Taking? Authorizing Provider   citalopram (CELEXA) 20 MG tablet take 1 tablet by mouth once daily 2/12/24   Lindsey Lucero DO   buPROPion (WELLBUTRIN XL) 150 MG extended release tablet take 1 tablet by mouth every morning 2/12/24   Lindsey Lucero DO   fluticasone (FLONASE) 50 MCG/ACT nasal spray 2 sprays by Each Nostril route daily 1/24/24   Lindsey Lucero DO   saline nasal gel (AYR) GEL by Nasal route as needed for Congestion 1/24/24   Lindsey Lucero DO   amphetamine-dextroamphetamine (ADDERALL XR) 15 MG extended release capsule Take 1 capsule by mouth daily for 30 days. Max Daily Amount: 1 capsule 1/8/24 2/7/24  Lindsey Lucero DO   lamoTRIgine (LAMICTAL) 25 MG tablet Take 1 tablet by mouth 2 times daily 11/29/23   Lindsey Lucero DO   gabapentin (NEURONTIN) 300 MG capsule take 1 capsule by mouth every morning then 1 capsule every IN THE AFTERNOON and 2 capsules at bedtime 11/29/23 4/10/24  Lindsey Lucero DO   magnesium oxide (MAG-OX) 400 MG tablet take 1 tablet by mouth at bedtime 9/13/23   Lindsey Lucero DO   ibuprofen (ADVIL;MOTRIN) 800 MG tablet Take 1 tablet by mouth every 8 hours as needed for Pain 1/1/23   Tee Gibbs MD   Multiple Vitamins-Minerals (HAIR SKIN AND NAILS FORMULA PO) Take by mouth    Nora Barroso MD   TURMERIC PO Take by mouth    Nora Barroso MD   FOLIC ACID PO Take 1,000 mg by mouth daily     Nora Barroso MD   acetaminophen (TYLENOL) 325 MG tablet Take 2 tablets by mouth every 6 hours as needed    Nora Barroso MD

## 2024-03-06 NOTE — TELEPHONE ENCOUNTER
Refill Request     Adderall XR 15 mg    [facility]    Health Maintenance   Topic Date Due    Hepatitis B vaccine (1 of 3 - 3-dose series) Never done    Cervical cancer screen  Never done    Shingles vaccine (1 of 2) Never done    Flu vaccine (1) Never done    COVID-19 Vaccine (2 - 2023-24 season) 09/01/2023    Breast cancer screen  06/20/2024    Depression Monitoring  01/24/2025    Colorectal Cancer Screen  03/08/2026    Lipids  10/11/2027    DTaP/Tdap/Td vaccine (2 - Td or Tdap) 11/29/2028    Hepatitis C screen  Completed    HIV screen  Completed    Hepatitis A vaccine  Aged Out    Hib vaccine  Aged Out    Polio vaccine  Aged Out    Meningococcal (ACWY) vaccine  Aged Out    Pneumococcal 0-64 years Vaccine  Aged Out             (applicable per patient's age: Cancer Screenings, Depression Screening, Fall Risk Screening, Immunizations)    Hemoglobin A1C (%)   Date Value   03/03/2023 5.1     LDL Cholesterol (mg/dL)   Date Value   10/11/2022 116     AST (U/L)   Date Value   01/17/2024 19     ALT (U/L)   Date Value   01/17/2024 12     BUN (mg/dL)   Date Value   01/17/2024 13      (goal A1C is < 7)   (goal LDL is <100) need 30-50% reduction from baseline     BP Readings from Last 3 Encounters:   01/24/24 138/82   01/17/24 (!) 146/77   12/10/23 (!) 105/94    (goal /80)      All Future Testing planned in [facility]:  Lab Frequency Next Occurrence   EMG Once 01/24/2024       Next Visit Date:  No future appointments.         Patient Active Problem List:     Anxiety     Insomnia     HTN (hypertension)     Venous insufficiency     Overweight

## 2024-03-24 ENCOUNTER — HOSPITAL ENCOUNTER (EMERGENCY)
Age: 54
Discharge: HOME | End: 2024-03-24
Attending: EMERGENCY MEDICINE
Payer: COMMERCIAL

## 2024-03-24 VITALS
HEART RATE: 80 BPM | WEIGHT: 189.3 LBS | TEMPERATURE: 97.8 F | OXYGEN SATURATION: 98 % | SYSTOLIC BLOOD PRESSURE: 131 MMHG | DIASTOLIC BLOOD PRESSURE: 78 MMHG | BODY MASS INDEX: 29.71 KG/M2 | HEIGHT: 67 IN | RESPIRATION RATE: 18 BRPM

## 2024-03-24 DIAGNOSIS — N39.0 URINARY TRACT INFECTION WITH HEMATURIA, SITE UNSPECIFIED: Primary | ICD-10-CM

## 2024-03-24 DIAGNOSIS — R31.9 URINARY TRACT INFECTION WITH HEMATURIA, SITE UNSPECIFIED: Primary | ICD-10-CM

## 2024-03-24 LAB
-: ABNORMAL
BACTERIA URNS QL MICRO: ABNORMAL
BILIRUB UR QL STRIP: ABNORMAL
CLARITY UR: ABNORMAL
COLOR UR: ABNORMAL
COMMENT: ABNORMAL
EPI CELLS #/AREA URNS HPF: ABNORMAL /HPF
GLUCOSE UR STRIP-MCNC: NEGATIVE MG/DL
HGB UR QL STRIP.AUTO: ABNORMAL
KETONES UR STRIP-MCNC: NEGATIVE MG/DL
LEUKOCYTE ESTERASE UR QL STRIP: ABNORMAL
NITRITE UR QL STRIP: POSITIVE
PH UR STRIP: 6 [PH] (ref 5–8)
PROT UR STRIP-MCNC: ABNORMAL MG/DL
RBC #/AREA URNS HPF: ABNORMAL /HPF (ref 0–2)
SP GR UR STRIP: 1.01 (ref 1–1.03)
UROBILINOGEN UR STRIP-ACNC: NORMAL EU/DL (ref 0–1)
WBC #/AREA URNS HPF: ABNORMAL /HPF

## 2024-03-24 PROCEDURE — 99283 EMERGENCY DEPT VISIT LOW MDM: CPT

## 2024-03-24 PROCEDURE — 87086 URINE CULTURE/COLONY COUNT: CPT

## 2024-03-24 PROCEDURE — 87186 SC STD MICRODIL/AGAR DIL: CPT

## 2024-03-24 PROCEDURE — 81001 URINALYSIS AUTO W/SCOPE: CPT

## 2024-03-24 PROCEDURE — 87088 URINE BACTERIA CULTURE: CPT

## 2024-03-24 RX ORDER — PHENAZOPYRIDINE HYDROCHLORIDE 100 MG/1
100 TABLET, FILM COATED ORAL 3 TIMES DAILY PRN
Qty: 9 TABLET | Refills: 0 | Status: SHIPPED | OUTPATIENT
Start: 2024-03-24 | End: 2024-03-27

## 2024-03-24 RX ORDER — NITROFURANTOIN 25; 75 MG/1; MG/1
100 CAPSULE ORAL 2 TIMES DAILY
Qty: 20 CAPSULE | Refills: 0 | Status: SHIPPED | OUTPATIENT
Start: 2024-03-24 | End: 2024-04-03

## 2024-03-24 ASSESSMENT — PAIN DESCRIPTION - ONSET: ONSET: ON-GOING

## 2024-03-24 ASSESSMENT — PAIN - FUNCTIONAL ASSESSMENT
PAIN_FUNCTIONAL_ASSESSMENT: ACTIVITIES ARE NOT PREVENTED
PAIN_FUNCTIONAL_ASSESSMENT: 0-10

## 2024-03-24 ASSESSMENT — PAIN DESCRIPTION - PAIN TYPE: TYPE: ACUTE PAIN

## 2024-03-24 ASSESSMENT — PAIN DESCRIPTION - LOCATION: LOCATION: BACK

## 2024-03-24 ASSESSMENT — PAIN SCALES - GENERAL: PAINLEVEL_OUTOF10: 6

## 2024-03-24 ASSESSMENT — PAIN DESCRIPTION - DESCRIPTORS: DESCRIPTORS: SPASM

## 2024-03-24 ASSESSMENT — PAIN DESCRIPTION - ORIENTATION: ORIENTATION: RIGHT;LOWER

## 2024-03-24 ASSESSMENT — PAIN DESCRIPTION - FREQUENCY: FREQUENCY: INTERMITTENT

## 2024-03-24 NOTE — ED PROVIDER NOTES
or sensory deficits noted      DIAGNOSTIC RESULTS             LABS:  Labs Reviewed   URINALYSIS WITH REFLEX TO CULTURE - Abnormal; Notable for the following components:       Result Value    Color, UA Orange (*)     Turbidity UA Hazy (*)     Bilirubin Urine NEGATIVE  Verified by ictotest. (*)     Urine Hgb 3+ (*)     Protein, UA 1+ (*)     Nitrite, Urine POSITIVE (*)     Leukocyte Esterase, Urine 3+ (*)     All other components within normal limits   MICROSCOPIC URINALYSIS - Abnormal; Notable for the following components:    Bacteria, UA 2+ (*)     All other components within normal limits   CULTURE, URINE       All other labs were within normal range or not returned as of this dictation.    EMERGENCY DEPARTMENT COURSE and DIFFERENTIAL DIAGNOSIS/MDM:   Vitals:    Vitals:    03/24/24 1231   BP: 131/78   Pulse: 80   Resp: 18   Temp: 97.8 °F (36.6 °C)   TempSrc: Oral   SpO2: 98%   Weight: 85.9 kg (189 lb 4.8 oz)   Height: 1.702 m (5' 7\")                 REASSESSMENT      Here in the ED urinalysis showed evidence of infection.  Patient will be started on antibiotics and Pyridium and discharged to follow-up with her primary doctor.    PROCEDURES:  Unless otherwise noted below, none     Procedures    FINAL IMPRESSION      1. Urinary tract infection with hematuria, site unspecified          DISPOSITION/PLAN   DISPOSITION Decision To Discharge 03/24/2024 12:53:06 PM      PATIENT REFERRED TO:  Lindsey Lucero DO  [address] [address]  [phone]    In 3 days        DISCHARGE MEDICATIONS:  New Prescriptions    NITROFURANTOIN, MACROCRYSTAL-MONOHYDRATE, (MACROBID) 100 MG CAPSULE    Take 1 capsule by mouth 2 times daily for 10 days    PHENAZOPYRIDINE (PYRIDIUM) 100 MG TABLET    Take 1 tablet by mouth 3 times daily as needed for Pain          (Please note that portions ofthis note were completed with a voice recognition program.  Efforts were made to edit the dictations but occasionally words are

## 2024-03-26 LAB
MICROORGANISM SPEC CULT: ABNORMAL
SPECIMEN DESCRIPTION: ABNORMAL

## 2024-04-19 DIAGNOSIS — G93.32 CHRONIC FATIGUE SYNDROME: ICD-10-CM

## 2024-04-19 RX ORDER — GABAPENTIN 300 MG/1
CAPSULE ORAL
Qty: 120 CAPSULE | Refills: 2 | Status: SHIPPED | OUTPATIENT
Start: 2024-04-19 | End: 2024-08-30

## 2024-04-19 RX ORDER — DEXTROAMPHETAMINE SACCHARATE, AMPHETAMINE ASPARTATE MONOHYDRATE, DEXTROAMPHETAMINE SULFATE AND AMPHETAMINE SULFATE 3.75; 3.75; 3.75; 3.75 MG/1; MG/1; MG/1; MG/1
1 CAPSULE, EXTENDED RELEASE ORAL DAILY
Qty: 30 CAPSULE | Refills: 0 | Status: SHIPPED | OUTPATIENT
Start: 2024-04-19 | End: 2024-05-19

## 2024-04-19 NOTE — TELEPHONE ENCOUNTER
Last visit:  1/24/2024 last adderall visit 11/29/23  Next Visit Date:  No future appointments.      Medication List:  Prior to Admission medications    Medication Sig Start Date End Date Taking? Authorizing Provider   amphetamine-dextroamphetamine (ADDERALL XR) 15 MG extended release capsule Take 1 capsule by mouth daily for 30 days. Max Daily Amount: 1 capsule 3/6/24 4/5/24  Lindsey Lucero DO   citalopram (CELEXA) 20 MG tablet take 1 tablet by mouth once daily 2/12/24   Lindsey Lucero DO   buPROPion (WELLBUTRIN XL) 150 MG extended release tablet take 1 tablet by mouth every morning 2/12/24   Lindsey Lucero DO   fluticasone (FLONASE) 50 MCG/ACT nasal spray 2 sprays by Each Nostril route daily 1/24/24   Lindsey Lucero DO   saline nasal gel (AYR) GEL by Nasal route as needed for Congestion 1/24/24   Lindsey Lucero DO   lamoTRIgine (LAMICTAL) 25 MG tablet Take 1 tablet by mouth 2 times daily 11/29/23   Lindsey Lucero DO   gabapentin (NEURONTIN) 300 MG capsule take 1 capsule by mouth every morning then 1 capsule every IN THE AFTERNOON and 2 capsules at bedtime 11/29/23 4/10/24  Lindsey Lucero DO   magnesium oxide (MAG-OX) 400 MG tablet take 1 tablet by mouth at bedtime 9/13/23   Lindsey Lucero DO   ibuprofen (ADVIL;MOTRIN) 800 MG tablet Take 1 tablet by mouth every 8 hours as needed for Pain 1/1/23   Tee Gibbs MD   Multiple Vitamins-Minerals (HAIR SKIN AND NAILS FORMULA PO) Take by mouth    Nora Barroso MD   TURMERIC PO Take by mouth    Nora Barroso MD   FOLIC ACID PO Take 1,000 mg by mouth daily     Nora Barroso MD   acetaminophen (TYLENOL) 325 MG tablet Take 2 tablets by mouth every 6 hours as needed    Nora Barroso MD

## 2024-04-19 NOTE — TELEPHONE ENCOUNTER
Adderall 15 mg  Gabapentin 300 mg    [facility] Maintenance   Topic Date Due    Hepatitis B vaccine (1 of 3 - 3-dose series) Never done    Cervical cancer screen  Never done    Shingles vaccine (1 of 2) Never done    COVID-19 Vaccine (2 - 2023-24 season) 09/01/2023    Breast cancer screen  06/20/2024    Flu vaccine (Season Ended) 08/01/2024    Depression Monitoring  01/24/2025    Colorectal Cancer Screen  03/08/2026    Lipids  10/11/2027    DTaP/Tdap/Td vaccine (2 - Td or Tdap) 11/29/2028    Hepatitis C screen  Completed    HIV screen  Completed    Hepatitis A vaccine  Aged Out    Hib vaccine  Aged Out    Polio vaccine  Aged Out    Meningococcal (ACWY) vaccine  Aged Out    Pneumococcal 0-64 years Vaccine  Aged Out             (applicable per patient's age: Cancer Screenings, Depression Screening, Fall Risk Screening, Immunizations)    Hemoglobin A1C (%)   Date Value   03/03/2023 5.1     LDL Cholesterol (mg/dL)   Date Value   10/11/2022 116     AST (U/L)   Date Value   01/17/2024 19     ALT (U/L)   Date Value   01/17/2024 12     BUN (mg/dL)   Date Value   01/17/2024 13      (goal A1C is < 7)   (goal LDL is <100) need 30-50% reduction from baseline     BP Readings from Last 3 Encounters:   03/24/24 131/78   01/24/24 138/82   01/17/24 (!) 146/77    (goal /80)      All Future Testing planned in [facility]:  Lab Frequency Next Occurrence       Next Visit Date:  No future appointments.         Patient Active Problem List:     Anxiety     Insomnia     HTN (hypertension)     Venous insufficiency     Overweight

## 2024-05-10 RX ORDER — MAGNESIUM OXIDE 400 MG/1
1 TABLET ORAL
Qty: 30 TABLET | Refills: 5 | Status: SHIPPED | OUTPATIENT
Start: 2024-05-10

## 2024-05-10 NOTE — TELEPHONE ENCOUNTER
Last OV 1/24/24 ed f/u     Next OV not scheduled    Requesting refill on magnesium oxide thru surescripts.  Rx pending

## 2024-06-04 DIAGNOSIS — G93.32 CHRONIC FATIGUE SYNDROME: ICD-10-CM

## 2024-06-04 RX ORDER — DEXTROAMPHETAMINE SACCHARATE, AMPHETAMINE ASPARTATE MONOHYDRATE, DEXTROAMPHETAMINE SULFATE AND AMPHETAMINE SULFATE 3.75; 3.75; 3.75; 3.75 MG/1; MG/1; MG/1; MG/1
1 CAPSULE, EXTENDED RELEASE ORAL DAILY
Qty: 30 CAPSULE | Refills: 0 | Status: SHIPPED | OUTPATIENT
Start: 2024-06-04 | End: 2024-07-04

## 2024-06-04 NOTE — TELEPHONE ENCOUNTER
Adderall 15 mg    [facility]   Topic Date Due    Hepatitis B vaccine (1 of 3 - 3-dose series) Never done    Cervical cancer screen  Never done    Shingles vaccine (1 of 2) Never done    COVID-19 Vaccine (2 - 2023-24 season) 09/01/2023    Breast cancer screen  06/20/2024    Flu vaccine (Season Ended) 08/01/2024    Depression Monitoring  01/24/2025    Colorectal Cancer Screen  03/08/2026    Lipids  10/11/2027    DTaP/Tdap/Td vaccine (2 - Td or Tdap) 11/29/2028    Hepatitis C screen  Completed    HIV screen  Completed    Hepatitis A vaccine  Aged Out    Hib vaccine  Aged Out    Polio vaccine  Aged Out    Meningococcal (ACWY) vaccine  Aged Out    Pneumococcal 0-64 years Vaccine  Aged Out             (applicable per patient's age: Cancer Screenings, Depression Screening, Fall Risk Screening, Immunizations)    Hemoglobin A1C (%)   Date Value   03/03/2023 5.1     AST (U/L)   Date Value   01/17/2024 19     ALT (U/L)   Date Value   01/17/2024 12     BUN (mg/dL)   Date Value   01/17/2024 13      (goal A1C is < 7)   (goal LDL is <100) need 30-50% reduction from baseline     BP Readings from Last 3 Encounters:   03/24/24 131/78   01/24/24 138/82   01/17/24 (!) 146/77    (goal /80)      All Future Testing planned in [facility]:  Lab Frequency Next Occurrence       Next Visit Date:  No future appointments.         Patient Active Problem List:     Anxiety     Insomnia     HTN (hypertension)     Venous insufficiency     Overweight

## 2024-06-04 NOTE — TELEPHONE ENCOUNTER
Not scheduled for 6 months appt      Last visit:  1/24/2024  Next Visit Date:  No future appointments.      Medication List:  Prior to Admission medications    Medication Sig Start Date End Date Taking? Authorizing Provider   magnesium oxide (MAG-OX) 400 MG tablet take 1 tablet by mouth at bedtime 5/10/24   Lindsey Lucero DO   amphetamine-dextroamphetamine (ADDERALL XR) 15 MG extended release capsule Take 1 capsule by mouth daily for 30 days. Max Daily Amount: 1 capsule 4/19/24 5/19/24  Lindsey Lucero DO   gabapentin (NEURONTIN) 300 MG capsule take 1 capsule by mouth every morning then 1 capsule every IN THE AFTERNOON and 2 capsules at bedtime 4/19/24 8/30/24  Lindsey Lucero DO   citalopram (CELEXA) 20 MG tablet take 1 tablet by mouth once daily 2/12/24   Lindsey Lucero DO   buPROPion (WELLBUTRIN XL) 150 MG extended release tablet take 1 tablet by mouth every morning 2/12/24   Lindsey Lucero DO   fluticasone (FLONASE) 50 MCG/ACT nasal spray 2 sprays by Each Nostril route daily 1/24/24   Lindsey Lucero DO   saline nasal gel (AYR) GEL by Nasal route as needed for Congestion 1/24/24   Lindsey Lucero DO   lamoTRIgine (LAMICTAL) 25 MG tablet Take 1 tablet by mouth 2 times daily 11/29/23   Lindsey Lucero DO   ibuprofen (ADVIL;MOTRIN) 800 MG tablet Take 1 tablet by mouth every 8 hours as needed for Pain 1/1/23   Tee Gibbs MD   Multiple Vitamins-Minerals (HAIR SKIN AND NAILS FORMULA PO) Take by mouth    Nora Barroso MD   TURMERIC PO Take by mouth    Nora Barroso MD   FOLIC ACID PO Take 1,000 mg by mouth daily     Nora Barroso MD   acetaminophen (TYLENOL) 325 MG tablet Take 2 tablets by mouth every 6 hours as needed    Nora Barroso MD

## 2024-07-09 DIAGNOSIS — G93.32 CHRONIC FATIGUE SYNDROME: ICD-10-CM

## 2024-07-09 RX ORDER — DEXTROAMPHETAMINE SACCHARATE, AMPHETAMINE ASPARTATE MONOHYDRATE, DEXTROAMPHETAMINE SULFATE AND AMPHETAMINE SULFATE 3.75; 3.75; 3.75; 3.75 MG/1; MG/1; MG/1; MG/1
1 CAPSULE, EXTENDED RELEASE ORAL DAILY
Qty: 30 CAPSULE | Refills: 0 | Status: SHIPPED | OUTPATIENT
Start: 2024-07-09 | End: 2024-08-08

## 2024-07-09 NOTE — TELEPHONE ENCOUNTER
Med refill request     Adderall XR 15 mg    [facility]    Next appt 8/13/24    Last seen 1/24/24    Health Maintenance   Topic Date Due    Hepatitis B vaccine (1 of 3 - 3-dose series) Never done    Cervical cancer screen  Never done    Shingles vaccine (1 of 2) Never done    COVID-19 Vaccine (2 - 2023-24 season) 09/01/2023    Breast cancer screen  06/20/2024    Flu vaccine (1) 08/01/2024    Depression Monitoring  01/24/2025    Colorectal Cancer Screen  03/08/2026    Lipids  10/11/2027    DTaP/Tdap/Td vaccine (2 - Td or Tdap) 11/29/2028    Hepatitis C screen  Completed    HIV screen  Completed    Hepatitis A vaccine  Aged Out    Hib vaccine  Aged Out    Polio vaccine  Aged Out    Meningococcal (ACWY) vaccine  Aged Out    Pneumococcal 0-64 years Vaccine  Aged Out             (applicable per patient's age: Cancer Screenings, Depression Screening, Fall Risk Screening, Immunizations)    Hemoglobin A1C (%)   Date Value   03/03/2023 5.1     AST (U/L)   Date Value   01/17/2024 19     ALT (U/L)   Date Value   01/17/2024 12     BUN (mg/dL)   Date Value   01/17/2024 13      (goal A1C is < 7)   (goal LDL is <100) need 30-50% reduction from baseline     BP Readings from Last 3 Encounters:   03/24/24 131/78   01/24/24 138/82   01/17/24 (!) 146/77    (goal /80)      All Future Testing planned in [facility]:  Lab Frequency Next Occurrence       Next Visit Date:  Future Appointments   Date Time Provider Department Center   8/13/2024 10:40 AM Lindsey Lucero DO [facility]            Patient Active Problem List:     Anxiety     Insomnia     HTN (hypertension)     Venous insufficiency     Overweight

## 2024-08-01 RX ORDER — BUPROPION HYDROCHLORIDE 150 MG/1
150 TABLET ORAL EVERY MORNING
Qty: 90 TABLET | Refills: 1 | Status: SHIPPED | OUTPATIENT
Start: 2024-08-01

## 2024-08-01 RX ORDER — CITALOPRAM 20 MG/1
20 TABLET ORAL DAILY
Qty: 90 TABLET | Refills: 1 | Status: SHIPPED | OUTPATIENT
Start: 2024-08-01

## 2024-08-01 NOTE — TELEPHONE ENCOUNTER
Last visit:  1/24/2024  Next Visit Date:    Future Appointments   Date Time Provider Department Center   8/13/2024 10:40 AM Lindsey Lucero DO [facility]         Medication List:  Prior to Admission medications    Medication Sig Start Date End Date Taking? Authorizing Provider   amphetamine-dextroamphetamine (ADDERALL XR) 15 MG extended release capsule Take 1 capsule by mouth daily for 30 days. Max Daily Amount: 1 capsule 7/9/24 8/8/24  Kymberly Collado MD   magnesium oxide (MAG-OX) 400 MG tablet take 1 tablet by mouth at bedtime 5/10/24   Lindsey Lucero DO   gabapentin (NEURONTIN) 300 MG capsule take 1 capsule by mouth every morning then 1 capsule every IN THE AFTERNOON and 2 capsules at bedtime 4/19/24 8/30/24  Lindsey Lucero DO   citalopram (CELEXA) 20 MG tablet take 1 tablet by mouth once daily 2/12/24   Lindsey Lucero DO   buPROPion (WELLBUTRIN XL) 150 MG extended release tablet take 1 tablet by mouth every morning 2/12/24   Lindsey Lucero DO   fluticasone (FLONASE) 50 MCG/ACT nasal spray 2 sprays by Each Nostril route daily 1/24/24   Lindsey Lucero DO   saline nasal gel (AYR) GEL by Nasal route as needed for Congestion 1/24/24   Lindsey Lucero DO   lamoTRIgine (LAMICTAL) 25 MG tablet Take 1 tablet by mouth 2 times daily 11/29/23   Lindsey Lucero DO   ibuprofen (ADVIL;MOTRIN) 800 MG tablet Take 1 tablet by mouth every 8 hours as needed for Pain 1/1/23   Tee Gibbs MD   Multiple Vitamins-Minerals (HAIR SKIN AND NAILS FORMULA PO) Take by mouth    Nora Barroso MD   TURMERIC PO Take by mouth    Nora Barroso MD   FOLIC ACID PO Take 1,000 mg by mouth daily     Nora Barroso MD   acetaminophen (TYLENOL) 325 MG tablet Take 2 tablets by mouth every 6 hours as needed    Nora Barroso MD

## 2024-08-13 ENCOUNTER — APPOINTMENT (OUTPATIENT)
Dept: CT IMAGING | Age: 54
End: 2024-08-13
Payer: COMMERCIAL

## 2024-08-13 ENCOUNTER — OFFICE VISIT (OUTPATIENT)
Dept: FAMILY MEDICINE CLINIC | Age: 54
End: 2024-08-13
Payer: COMMERCIAL

## 2024-08-13 ENCOUNTER — APPOINTMENT (OUTPATIENT)
Dept: GENERAL RADIOLOGY | Age: 54
End: 2024-08-13
Payer: COMMERCIAL

## 2024-08-13 ENCOUNTER — HOSPITAL ENCOUNTER (EMERGENCY)
Age: 54
Discharge: HOME | End: 2024-08-13
Attending: FAMILY MEDICINE
Payer: COMMERCIAL

## 2024-08-13 ENCOUNTER — HOSPITAL ENCOUNTER (OUTPATIENT)
Age: 54
Discharge: HOME | End: 2024-08-13
Payer: COMMERCIAL

## 2024-08-13 VITALS
RESPIRATION RATE: 19 BRPM | SYSTOLIC BLOOD PRESSURE: 141 MMHG | OXYGEN SATURATION: 99 % | DIASTOLIC BLOOD PRESSURE: 80 MMHG | HEIGHT: 68 IN | BODY MASS INDEX: 27.28 KG/M2 | HEART RATE: 81 BPM | TEMPERATURE: 98 F | WEIGHT: 180 LBS

## 2024-08-13 VITALS
DIASTOLIC BLOOD PRESSURE: 80 MMHG | WEIGHT: 182 LBS | OXYGEN SATURATION: 97 % | HEIGHT: 68 IN | HEART RATE: 65 BPM | BODY MASS INDEX: 27.58 KG/M2 | SYSTOLIC BLOOD PRESSURE: 138 MMHG

## 2024-08-13 DIAGNOSIS — Z13.6 SCREENING FOR CARDIOVASCULAR CONDITION: ICD-10-CM

## 2024-08-13 DIAGNOSIS — Z12.31 VISIT FOR SCREENING MAMMOGRAM: ICD-10-CM

## 2024-08-13 DIAGNOSIS — G93.32 CHRONIC FATIGUE SYNDROME: ICD-10-CM

## 2024-08-13 DIAGNOSIS — M21.372 LEFT FOOT DROP: Primary | ICD-10-CM

## 2024-08-13 DIAGNOSIS — S30.0XXA CONTUSION OF BUTTOCK, INITIAL ENCOUNTER: Primary | ICD-10-CM

## 2024-08-13 DIAGNOSIS — J45.20 MILD INTERMITTENT ASTHMA WITHOUT COMPLICATION: ICD-10-CM
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
## 2024-08-13 DIAGNOSIS — S46.811A STRAIN OF RIGHT TRAPEZIUS MUSCLE, INITIAL ENCOUNTER: ICD-10-CM

## 2024-08-13 DIAGNOSIS — F41.9 ANXIETY: ICD-10-CM

## 2024-08-13 DIAGNOSIS — S80.01XA CONTUSION OF RIGHT KNEE, INITIAL ENCOUNTER: ICD-10-CM

## 2024-08-13 DIAGNOSIS — M25.461 KNEE EFFUSION, RIGHT: ICD-10-CM

## 2024-08-13 LAB
ALBUMIN SERPL-MCNC: 4.4 G/DL (ref 3.5–5.2)
ALP SERPL-CCNC: 97 U/L (ref 35–104)
ALT SERPL-CCNC: 15 U/L (ref 5–33)
ANION GAP SERPL CALCULATED.3IONS-SCNC: 12 MMOL/L (ref 9–17)
AST SERPL-CCNC: 21 U/L
BASOPHILS # BLD: 0.02 K/UL (ref 0–0.2)
BASOPHILS NFR BLD: 0 % (ref 0–2)
BILIRUB SERPL-MCNC: 0.7 MG/DL (ref 0.3–1.2)
BUN SERPL-MCNC: 7 MG/DL (ref 6–20)
BUN/CREAT SERPL: 8 (ref 9–20)
CALCIUM SERPL-MCNC: 9.3 MG/DL (ref 8.6–10.4)
CHLORIDE SERPL-SCNC: 99 MMOL/L (ref 98–107)
CO2 SERPL-SCNC: 26 MMOL/L (ref 20–31)
CREAT SERPL-MCNC: 0.9 MG/DL (ref 0.5–0.9)
EOSINOPHIL # BLD: 0.03 K/UL (ref 0–0.4)
EOSINOPHILS RELATIVE PERCENT: 0 % (ref 0–5)
ERYTHROCYTE [DISTWIDTH] IN BLOOD BY AUTOMATED COUNT: 13.3 % (ref 12.1–15.2)
GFR, ESTIMATED: 77 ML/MIN/1.73M2
GLUCOSE SERPL-MCNC: 82 MG/DL (ref 70–99)
HCT VFR BLD AUTO: 37.5 % (ref 36–46)
HGB BLD-MCNC: 12.3 G/DL (ref 12–16)
IMM GRANULOCYTES # BLD AUTO: 0.01 K/UL (ref 0–0.3)
IMM GRANULOCYTES NFR BLD: 0 % (ref 0–5)
LYMPHOCYTES NFR BLD: 1.67 K/UL (ref 1–4.8)
LYMPHOCYTES RELATIVE PERCENT: 16 % (ref 15–40)
MCH RBC QN AUTO: 28.3 PG (ref 26–34)
MCHC RBC AUTO-ENTMCNC: 32.8 G/DL (ref 31–37)
MCV RBC AUTO: 86.4 FL (ref 80–100)
MONOCYTES NFR BLD: 0.74 K/UL (ref 0–1)
MONOCYTES NFR BLD: 7 % (ref 4–8)
NEUTROPHILS NFR BLD: 77 % (ref 47–75)
NEUTS SEG NFR BLD: 8.21 K/UL (ref 2.5–7)
PLATELET # BLD AUTO: 334 K/UL (ref 140–450)
PMV BLD AUTO: 9.5 FL (ref 6–12)
POTASSIUM SERPL-SCNC: 3.6 MMOL/L (ref 3.7–5.3)
PROT SERPL-MCNC: 7 G/DL (ref 6.4–8.3)
RBC # BLD AUTO: 4.34 M/UL (ref 4–5.2)
SODIUM SERPL-SCNC: 137 MMOL/L (ref 135–144)
TSH SERPL DL<=0.05 MIU/L-ACNC: 3.02 UIU/ML (ref 0.3–5)
WBC OTHER # BLD: 10.7 K/UL (ref 3.5–11)

## 2024-08-13 PROCEDURE — 72192 CT PELVIS W/O DYE: CPT

## 2024-08-13 PROCEDURE — 85025 COMPLETE CBC W/AUTO DIFF WBC: CPT

## 2024-08-13 PROCEDURE — 73030 X-RAY EXAM OF SHOULDER: CPT

## 2024-08-13 PROCEDURE — 6370000000 HC RX 637 (ALT 250 FOR IP): Performed by: FAMILY MEDICINE

## 2024-08-13 PROCEDURE — 3075F SYST BP GE 130 - 139MM HG: CPT | Performed by: FAMILY MEDICINE

## 2024-08-13 PROCEDURE — 73564 X-RAY EXAM KNEE 4 OR MORE: CPT

## 2024-08-13 PROCEDURE — 99214 OFFICE O/P EST MOD 30 MIN: CPT | Performed by: FAMILY MEDICINE

## 2024-08-13 PROCEDURE — 80061 LIPID PANEL: CPT

## 2024-08-13 PROCEDURE — 96372 THER/PROPH/DIAG INJ SC/IM: CPT

## 2024-08-13 PROCEDURE — 36415 COLL VENOUS BLD VENIPUNCTURE: CPT

## 2024-08-13 PROCEDURE — 99284 EMERGENCY DEPT VISIT MOD MDM: CPT

## 2024-08-13 PROCEDURE — 3079F DIAST BP 80-89 MM HG: CPT | Performed by: FAMILY MEDICINE

## 2024-08-13 PROCEDURE — 84443 ASSAY THYROID STIM HORMONE: CPT

## 2024-08-13 PROCEDURE — 6360000002 HC RX W HCPCS: Performed by: FAMILY MEDICINE

## 2024-08-13 PROCEDURE — 80053 COMPREHEN METABOLIC PANEL: CPT

## 2024-08-13 RX ORDER — ALPRAZOLAM 0.5 MG/1
0.5 TABLET ORAL 3 TIMES DAILY PRN
Qty: 60 TABLET | Refills: 0 | Status: SHIPPED | OUTPATIENT
Start: 2024-08-13 | End: 2024-09-12

## 2024-08-13 RX ORDER — ALPRAZOLAM 0.5 MG/1
0.5 TABLET ORAL 3 TIMES DAILY PRN
Qty: 60 TABLET | Refills: 0 | Status: CANCELLED | OUTPATIENT
Start: 2024-08-13 | End: 2024-09-12

## 2024-08-13 RX ORDER — KETOROLAC TROMETHAMINE 30 MG/ML
60 INJECTION, SOLUTION INTRAMUSCULAR; INTRAVENOUS ONCE
Status: COMPLETED | OUTPATIENT
Start: 2024-08-13 | End: 2024-08-13

## 2024-08-13 RX ORDER — ALBUTEROL SULFATE 90 UG/1
2 AEROSOL, METERED RESPIRATORY (INHALATION) EVERY 4 HOURS PRN
Qty: 18 G | Refills: 0 | Status: SHIPPED | OUTPATIENT
Start: 2024-08-13

## 2024-08-13 RX ORDER — METHOCARBAMOL 750 MG/1
750 TABLET, FILM COATED ORAL 3 TIMES DAILY PRN
Qty: 30 TABLET | Refills: 0 | Status: SHIPPED | OUTPATIENT
Start: 2024-08-13 | End: 2024-08-23

## 2024-08-13 RX ORDER — ACETAMINOPHEN 500 MG
1000 TABLET ORAL ONCE
Status: COMPLETED | OUTPATIENT
Start: 2024-08-13 | End: 2024-08-13

## 2024-08-13 RX ORDER — IBUPROFEN 600 MG/1
600 TABLET ORAL EVERY 6 HOURS PRN
Qty: 30 TABLET | Refills: 0 | Status: SHIPPED | OUTPATIENT
Start: 2024-08-13 | End: 2024-08-13 | Stop reason: ALTCHOICE

## 2024-08-13 RX ADMIN — ACETAMINOPHEN 1000 MG: 500 TABLET, FILM COATED ORAL at 09:16

## 2024-08-13 RX ADMIN — KETOROLAC TROMETHAMINE 60 MG: 60 INJECTION, SOLUTION INTRAMUSCULAR at 09:16

## 2024-08-13 SDOH — ECONOMIC STABILITY: FOOD INSECURITY: WITHIN THE PAST 12 MONTHS, YOU WORRIED THAT YOUR FOOD WOULD RUN OUT BEFORE YOU GOT MONEY TO BUY MORE.: NEVER TRUE

## 2024-08-13 SDOH — ECONOMIC STABILITY: FOOD INSECURITY: WITHIN THE PAST 12 MONTHS, THE FOOD YOU BOUGHT JUST DIDN'T LAST AND YOU DIDN'T HAVE MONEY TO GET MORE.: NEVER TRUE

## 2024-08-13 SDOH — ECONOMIC STABILITY: INCOME INSECURITY: HOW HARD IS IT FOR YOU TO PAY FOR THE VERY BASICS LIKE FOOD, HOUSING, MEDICAL CARE, AND HEATING?: NOT HARD AT ALL

## 2024-08-13 ASSESSMENT — PATIENT HEALTH QUESTIONNAIRE - PHQ9
SUM OF ALL RESPONSES TO PHQ QUESTIONS 1-9: 2
5. POOR APPETITE OR OVEREATING: NOT AT ALL
10. IF YOU CHECKED OFF ANY PROBLEMS, HOW DIFFICULT HAVE THESE PROBLEMS MADE IT FOR YOU TO DO YOUR WORK, TAKE CARE OF THINGS AT HOME, OR GET ALONG WITH OTHER PEOPLE: NOT DIFFICULT AT ALL
SUM OF ALL RESPONSES TO PHQ QUESTIONS 1-9: 2
9. THOUGHTS THAT YOU WOULD BE BETTER OFF DEAD, OR OF HURTING YOURSELF: NOT AT ALL
SUM OF ALL RESPONSES TO PHQ QUESTIONS 1-9: 2
8. MOVING OR SPEAKING SO SLOWLY THAT OTHER PEOPLE COULD HAVE NOTICED. OR THE OPPOSITE, BEING SO FIGETY OR RESTLESS THAT YOU HAVE BEEN MOVING AROUND A LOT MORE THAN USUAL: NOT AT ALL
1. LITTLE INTEREST OR PLEASURE IN DOING THINGS: NOT AT ALL
7. TROUBLE CONCENTRATING ON THINGS, SUCH AS READING THE NEWSPAPER OR WATCHING TELEVISION: NOT AT ALL
SUM OF ALL RESPONSES TO PHQ QUESTIONS 1-9: 2
2. FEELING DOWN, DEPRESSED OR HOPELESS: SEVERAL DAYS
3. TROUBLE FALLING OR STAYING ASLEEP: SEVERAL DAYS
SUM OF ALL RESPONSES TO PHQ9 QUESTIONS 1 & 2: 1
6. FEELING BAD ABOUT YOURSELF - OR THAT YOU ARE A FAILURE OR HAVE LET YOURSELF OR YOUR FAMILY DOWN: NOT AT ALL
4. FEELING TIRED OR HAVING LITTLE ENERGY: NOT AT ALL

## 2024-08-13 ASSESSMENT — PAIN DESCRIPTION - DESCRIPTORS
DESCRIPTORS: ACHING
DESCRIPTORS: ACHING

## 2024-08-13 ASSESSMENT — PAIN - FUNCTIONAL ASSESSMENT: PAIN_FUNCTIONAL_ASSESSMENT: 0-10

## 2024-08-13 ASSESSMENT — PAIN DESCRIPTION - LOCATION
LOCATION: BACK;ARM;LEG
LOCATION: HIP

## 2024-08-13 ASSESSMENT — PAIN DESCRIPTION - ORIENTATION
ORIENTATION: RIGHT
ORIENTATION: RIGHT

## 2024-08-13 ASSESSMENT — PAIN SCALES - GENERAL
PAINLEVEL_OUTOF10: 5
PAINLEVEL_OUTOF10: 7

## 2024-08-13 ASSESSMENT — PAIN DESCRIPTION - PAIN TYPE: TYPE: ACUTE PAIN

## 2024-08-13 NOTE — ED PROVIDER NOTES
by me as: N/A    See more data below for the lab and radiology tests and orders.    3)  Treatment and Disposition    Patient repeat assessment:  As above    Disposition discussion with patient/family: Discharge    Case discussed with consulting clinician:  As above    Social determinants of health impacting treatment or disposition: N/A    Shared Decision Making: N/A    Code Status Discussion: N/A      REASSESSMENT     As above      CRITICAL CARE TIME     Total Critical Care time was 0 minutes, excluding separately reportable procedures.  There was a high probability of clinically significant/life threatening deterioration in the patient's condition which required my urgent intervention.      PROCEDURES:  Unless otherwise noted below, none     Procedures    FINAL IMPRESSION      1. Contusion of buttock, initial encounter    2. Strain of right trapezius muscle, initial encounter    3. Contusion of right knee, initial encounter    4. Knee effusion, right    5. Accidental fall, initial encounter          DISPOSITION/PLAN     DISPOSITION Decision To Discharge 08/13/2024 09:27:27 AM      PATIENT REFERRED TO:  [facility] [place] [address]  [phone]  Call         DISCHARGE MEDICATIONS:  Discharge Medication List as of 8/13/2024  9:34 AM        START taking these medications    Details   methocarbamol (ROBAXIN-750) 750 MG tablet Take 1 tablet by mouth 3 times daily as needed (muscle spasms), Disp-30 tablet, R-0Normal      !! ibuprofen (IBU) 600 MG tablet Take 1 tablet by mouth every 6 hours as needed for Pain, Disp-30 tablet, R-0Normal       !! - Potential duplicate medications found. Please discuss with provider.          (Please note that portions of this note were completed with a voice recognition program.  Efforts were made to edit the dictations but occasionally words are mis-transcribed.)    Tee Gibbs MD (electronically signed)  Attending

## 2024-08-13 NOTE — DISCHARGE INSTRUCTIONS
All examined your shoulder buttock/hip, and knee today from your fall, some injuries after fall may not be readily apparent, and is very important to follow-up with occupational health clinic, calling them today to set up appointment time.  Your appoint with your primary care provider later today is a separate event and they cannot really follow-up on Workmen's Comp. cases.    You can ice down your shoulder and knee as needed, Motrin/Tylenol for pain, will prescribe muscle relaxants nodes important note that these can make people very tired and it is not recommended that you operate any heavy equipment or drive a vehicle, and absolutely no alcohol when taking this medication.  The prescription is sent to your pharmacy of record.

## 2024-08-13 NOTE — PROGRESS NOTES
Name: Irene Galvez  : 1970         Chief Complaint:     Chief Complaint   Patient presents with    Hypertension    Anxiety    Fatigue       History of Present Illness:      Irene Galvez is a 53 y.o.  female who presents with Hypertension, Anxiety, and Fatigue      HPI    Anxiety a little worse, sister with stage iv lung cancer and in hospital, pt concerned as she was found at one point to have a lung nodule, also having arrhythmia mentioned (palpitations while pregnant with son), will feel an extra beat if she gets startled while falling asleep. Depression doing better, mood good. Meds as directed and feels she's reached more of an acceptance with boyfriend's behavior, just leaves the room if he's speaking in a derogatory way to her.     Still has weakness with L foot drop. Unsure what happened with EMG.    Couple wks ago had dysuria again but cleared with drinking more water. Also got an otc product from [employer] to help prevent UTI's, contains cranberry. Started fish oil recently also.     Sometimes having SOB r/t sinuses draining, helped by inhaler.    Chronic fatigue doing well on adderall, takes daily without adverse effect.    Medical History:     Patient Active Problem List   Diagnosis    Anxiety    Insomnia    HTN (hypertension)    Venous insufficiency    Overweight       Medications:       Prior to Admission medications    Medication Sig Start Date End Date Taking? Authorizing Provider   methocarbamol (ROBAXIN-750) 750 MG tablet Take 1 tablet by mouth 3 times daily as needed (muscle spasms) 24 Yes Tee Gibbs MD   albuterol sulfate HFA (VENTOLIN HFA) 108 (90 Base) MCG/ACT inhaler Inhale 2 puffs into the lungs every 4 hours as needed for Wheezing or Shortness of Breath 24  Yes Lindsey Lucero DO   ALPRAZolam (XANAX) 0.5 MG tablet Take 1 tablet by mouth 3 times daily as needed for Anxiety for up to 30 days. 24 Yes Lindsey Lucero DO   buPROPion

## 2024-08-14 LAB
CHOLEST SERPL-MCNC: 221 MG/DL (ref 0–199)
CHOLESTEROL/HDL RATIO: 3
HDLC SERPL-MCNC: 72 MG/DL
LDLC SERPL CALC-MCNC: 135 MG/DL (ref 0–100)
TRIGL SERPL-MCNC: 72 MG/DL
VLDLC SERPL CALC-MCNC: 14 MG/DL

## 2024-08-30 DIAGNOSIS — G93.32 CHRONIC FATIGUE SYNDROME: ICD-10-CM

## 2024-08-30 RX ORDER — DEXTROAMPHETAMINE SACCHARATE, AMPHETAMINE ASPARTATE MONOHYDRATE, DEXTROAMPHETAMINE SULFATE AND AMPHETAMINE SULFATE 3.75; 3.75; 3.75; 3.75 MG/1; MG/1; MG/1; MG/1
1 CAPSULE, EXTENDED RELEASE ORAL DAILY
Qty: 30 CAPSULE | Refills: 0 | Status: SHIPPED | OUTPATIENT
Start: 2024-08-30 | End: 2024-09-29

## 2024-08-30 NOTE — TELEPHONE ENCOUNTER
Adderall 15 mg    Dm - [place]    Health Maintenance   Topic Date Due    Hepatitis B vaccine (1 of 3 - 19+ 3-dose series) Never done    Cervical cancer screen  Never done    Shingles vaccine (1 of 2) Never done    COVID-19 Vaccine (2 - 2023-24 season) 09/01/2023    Breast cancer screen  06/20/2024    Flu vaccine (1) Never done    Depression Monitoring  08/13/2025    Colorectal Cancer Screen  03/08/2026    DTaP/Tdap/Td vaccine (2 - Td or Tdap) 11/29/2028    Lipids  08/13/2029    Hepatitis C screen  Completed    HIV screen  Completed    Hepatitis A vaccine  Aged Out    Hib vaccine  Aged Out    Polio vaccine  Aged Out    Meningococcal (ACWY) vaccine  Aged Out    Pneumococcal 0-64 years Vaccine  Aged Out             (applicable per patient's age: Cancer Screenings, Depression Screening, Fall Risk Screening, Immunizations)    Hemoglobin A1C (%)   Date Value   03/03/2023 5.1     AST (U/L)   Date Value   08/13/2024 21     ALT (U/L)   Date Value   08/13/2024 15     BUN (mg/dL)   Date Value   08/13/2024 7      (goal A1C is < 7)   (goal LDL is <100) need 30-50% reduction from baseline     BP Readings from Last 3 Encounters:   08/13/24 138/80   08/13/24 (!) 141/80   03/24/24 131/78    (goal /80)      All Future Testing planned in [facility]:  Lab Frequency Next Occurrence   NELA AV DIGITAL SCREEN BILATERAL Once 08/13/2024   EMG Once 08/13/2024       Next Visit Date:  No future appointments.         Patient Active Problem List:     Anxiety     Insomnia     HTN (hypertension)     Venous insufficiency     Overweight

## 2024-09-19 RX ORDER — GABAPENTIN 300 MG/1
CAPSULE ORAL
Qty: 120 CAPSULE | Refills: 2 | Status: SHIPPED | OUTPATIENT
Start: 2024-09-19 | End: 2025-01-30

## 2024-09-19 RX ORDER — MAGNESIUM OXIDE 400 MG/1
TABLET ORAL
Qty: 30 TABLET | Refills: 5 | Status: SHIPPED | OUTPATIENT
Start: 2024-09-19

## 2024-10-03 DIAGNOSIS — G93.32 CHRONIC FATIGUE SYNDROME: ICD-10-CM

## 2024-10-03 RX ORDER — DEXTROAMPHETAMINE SACCHARATE, AMPHETAMINE ASPARTATE MONOHYDRATE, DEXTROAMPHETAMINE SULFATE AND AMPHETAMINE SULFATE 3.75; 3.75; 3.75; 3.75 MG/1; MG/1; MG/1; MG/1
1 CAPSULE, EXTENDED RELEASE ORAL DAILY
Qty: 30 CAPSULE | Refills: 0 | Status: SHIPPED | OUTPATIENT
Start: 2024-10-03 | End: 2024-11-02

## 2024-10-03 NOTE — TELEPHONE ENCOUNTER
Last visit:  8/13/2024  Next Visit Date:  No future appointments.      Medication List:  Prior to Admission medications    Medication Sig Start Date End Date Taking? Authorizing Provider   gabapentin (NEURONTIN) 300 MG capsule take 1 capsule by mouth every morning then 1 capsule every IN THE AFTERNOON and 2 capsules at bedtime 9/19/24 1/30/25  Lindsey Lucero DO   magnesium oxide (MAG-OX) 400 MG tablet take 1 tablet by mouth at bedtime 9/19/24   Lindsey Lucero DO   amphetamine-dextroamphetamine (ADDERALL XR) 15 MG extended release capsule Take 1 capsule by mouth daily for 30 days. Max Daily Amount: 1 capsule 8/30/24 9/29/24  Lindsey Lucero DO   albuterol sulfate HFA (VENTOLIN HFA) 108 (90 Base) MCG/ACT inhaler Inhale 2 puffs into the lungs every 4 hours as needed for Wheezing or Shortness of Breath 8/13/24   Lindsey Lucero DO   buPROPion (WELLBUTRIN XL) 150 MG extended release tablet take 1 tablet by mouth every morning 8/1/24   Lindsey Lucero DO   citalopram (CELEXA) 20 MG tablet take 1 tablet by mouth once daily 8/1/24   Lindsey Lucero DO   fluticasone (FLONASE) 50 MCG/ACT nasal spray 2 sprays by Each Nostril route daily 1/24/24   Lindsey Lucero DO   saline nasal gel (AYR) GEL by Nasal route as needed for Congestion 1/24/24   Lindsey Lucero DO   lamoTRIgine (LAMICTAL) 25 MG tablet Take 1 tablet by mouth 2 times daily 11/29/23   Lindsey Lucero DO   Multiple Vitamins-Minerals (HAIR SKIN AND NAILS FORMULA PO) Take by mouth    Nora Barroso MD   TURMERIC PO Take by mouth    Nora Barroso MD   FOLIC ACID PO Take 1,000 mg by mouth daily     Nora Barroso MD   acetaminophen (TYLENOL) 325 MG tablet Take 2 tablets by mouth every 6 hours as needed    Nora Barroso MD

## 2024-10-07 ENCOUNTER — TELEPHONE (OUTPATIENT)
Dept: FAMILY MEDICINE CLINIC | Age: 54
End: 2024-10-07

## 2024-10-07 DIAGNOSIS — G93.32 CHRONIC FATIGUE SYNDROME: Primary | ICD-10-CM

## 2024-10-07 RX ORDER — MODAFINIL 200 MG/1
200 TABLET ORAL DAILY
Qty: 30 TABLET | Refills: 0 | Status: SHIPPED | OUTPATIENT
Start: 2024-10-07 | End: 2024-11-06

## 2024-10-07 NOTE — TELEPHONE ENCOUNTER
Patient is calling to see if there is something else besides Adderall Dr. Lucero can put her on?  [facility]      [facility]    Health Maintenance   Topic Date Due    Hepatitis B vaccine (1 of 3 - 19+ 3-dose series) Never done    Cervical cancer screen  Never done    Shingles vaccine (1 of 2) Never done    Breast cancer screen  06/20/2024    Flu vaccine (1) Never done    COVID-19 Vaccine (2 - 2023-24 season) 09/01/2024    Depression Monitoring  08/13/2025    Colorectal Cancer Screen  03/08/2026    DTaP/Tdap/Td vaccine (2 - Td or Tdap) 11/29/2028    Lipids  08/13/2029    Hepatitis C screen  Completed    HIV screen  Completed    Hepatitis A vaccine  Aged Out    Hib vaccine  Aged Out    Polio vaccine  Aged Out    Meningococcal (ACWY) vaccine  Aged Out    Pneumococcal 0-64 years Vaccine  Aged Out             (applicable per patient's age: Cancer Screenings, Depression Screening, Fall Risk Screening, Immunizations)    Hemoglobin A1C (%)   Date Value   03/03/2023 5.1     AST (U/L)   Date Value   08/13/2024 21     ALT (U/L)   Date Value   08/13/2024 15     BUN (mg/dL)   Date Value   08/13/2024 7      (goal A1C is < 7)   (goal LDL is <100) need 30-50% reduction from baseline     BP Readings from Last 3 Encounters:   08/13/24 138/80   08/13/24 (!) 141/80   03/24/24 131/78    (goal /80)      All Future Testing planned in [facility]:  Lab Frequency Next Occurrence   NELA AV DIGITAL SCREEN BILATERAL Once 08/13/2024   EMG Once 08/13/2024       Next Visit Date:  No future appointments.         Patient Active Problem List:     Anxiety     Insomnia     HTN (hypertension)     Venous insufficiency     Overweight

## 2024-10-23 ENCOUNTER — OFFICE VISIT (OUTPATIENT)
Dept: FAMILY MEDICINE CLINIC | Age: 54
End: 2024-10-23
Payer: COMMERCIAL

## 2024-10-23 VITALS
SYSTOLIC BLOOD PRESSURE: 138 MMHG | OXYGEN SATURATION: 99 % | DIASTOLIC BLOOD PRESSURE: 82 MMHG | HEART RATE: 70 BPM | BODY MASS INDEX: 26.07 KG/M2 | WEIGHT: 172 LBS | HEIGHT: 68 IN

## 2024-10-23 DIAGNOSIS — F51.01 PRIMARY INSOMNIA: Primary | ICD-10-CM

## 2024-10-23 DIAGNOSIS — F41.9 ANXIETY: ICD-10-CM

## 2024-10-23 DIAGNOSIS — L98.9 SKIN LESION OF CHEEK: ICD-10-CM

## 2024-10-23 PROCEDURE — 99214 OFFICE O/P EST MOD 30 MIN: CPT | Performed by: FAMILY MEDICINE

## 2024-10-23 PROCEDURE — 3079F DIAST BP 80-89 MM HG: CPT | Performed by: FAMILY MEDICINE

## 2024-10-23 PROCEDURE — 3075F SYST BP GE 130 - 139MM HG: CPT | Performed by: FAMILY MEDICINE

## 2024-10-23 RX ORDER — CITALOPRAM HYDROBROMIDE 40 MG/1
40 TABLET ORAL DAILY
Qty: 90 TABLET | Refills: 1 | Status: SHIPPED | OUTPATIENT
Start: 2024-10-23

## 2024-10-23 RX ORDER — LANOLIN ALCOHOL/MO/W.PET/CERES
400 CREAM (GRAM) TOPICAL NIGHTLY
COMMUNITY
Start: 2024-09-19

## 2024-10-23 RX ORDER — TRAZODONE HYDROCHLORIDE 50 MG/1
50-100 TABLET, FILM COATED ORAL NIGHTLY
Qty: 60 TABLET | Refills: 2 | Status: SHIPPED | OUTPATIENT
Start: 2024-10-23

## 2024-10-23 NOTE — PROGRESS NOTES
at high doses. Trial of trazodone.  Reasonable control, mainly causing trouble with sleep. Cont celexa same dosing.  Uncertain etiology, resolving. Advised continuing moisturizing, will recheck at next visit and if looking like AK will perform cryo vs bx. F/u if worsening.      Requested Prescriptions     Signed Prescriptions Disp Refills    citalopram (CELEXA) 40 MG tablet 90 tablet 1     Sig: Take 1 tablet by mouth daily    traZODone (DESYREL) 50 MG tablet 60 tablet 2     Sig: Take 1-2 tablets by mouth nightly         Patient Instructions   [employer] SURVEY:    You may be receiving a survey from [employer] regarding your visit today.    You may get this in the mail, through your MyChart or in your email.     Please complete the survey to enable us to provide the highest quality of care to you and your family.    If you cannot score us as very good ( 5 Stars) on any question, please feel free to call the office to discuss how we could have made your experience exceptional.     Thank you.    Clinical Care Team:   DO Kash Azul CMA                                     Triage: Zamzam Nguyen CMA              Clerical Team:    Zamzam Westfall     Central [phone]           Billing questions: [phone]           Medical Records Request: [phone]           signed by Lindsey Lucero DO on 10/27/2024 at 5:20 PM  [facility]  [address] [address]  Dept: [phone]

## 2024-10-23 NOTE — PATIENT INSTRUCTIONS
[employer] SURVEY:    You may be receiving a survey from [employer] regarding your visit today.    You may get this in the mail, through your MyChart or in your email.     Please complete the survey to enable us to provide the highest quality of care to you and your family.    If you cannot score us as very good ( 5 Stars) on any question, please feel free to call the office to discuss how we could have made your experience exceptional.     Thank you.    Clinical Care Team:   DO Kash Azul CMA                                     Triage: Zamzam Nguyen CMA              Clerical Team:    Zamzam Westfall     [place] [phone]           Billing questions: [phone]           Medical Records Request: [phone]

## 2024-11-06 ENCOUNTER — HOSPITAL ENCOUNTER (OUTPATIENT)
Dept: MAMMOGRAPHY | Age: 54
Discharge: HOME | End: 2024-11-08
Attending: FAMILY MEDICINE
Payer: COMMERCIAL

## 2024-11-06 DIAGNOSIS — Z12.31 VISIT FOR SCREENING MAMMOGRAM: ICD-10-CM

## 2024-11-06 PROCEDURE — 77063 BREAST TOMOSYNTHESIS BI: CPT

## 2024-11-11 ENCOUNTER — TELEPHONE (OUTPATIENT)
Dept: FAMILY MEDICINE CLINIC | Age: 54
End: 2024-11-11

## 2024-11-11 RX ORDER — LANOLIN ALCOHOL/MO/W.PET/CERES
400 CREAM (GRAM) TOPICAL NIGHTLY
Qty: 90 TABLET | Refills: 3 | Status: SHIPPED | OUTPATIENT
Start: 2024-11-11

## 2024-11-11 NOTE — TELEPHONE ENCOUNTER
Irene called in asking about refill on her magnesium oxide. She stated that the pharmacy told her that they reached out to you for a refill and stated that something was said about a change but never receive an order. Please advise

## 2024-11-25 DIAGNOSIS — G93.32 CHRONIC FATIGUE SYNDROME: ICD-10-CM

## 2024-11-25 RX ORDER — MODAFINIL 200 MG/1
200 TABLET ORAL DAILY
Qty: 30 TABLET | Refills: 0 | Status: SHIPPED | OUTPATIENT
Start: 2024-11-25 | End: 2024-11-27 | Stop reason: SDUPTHER

## 2024-11-25 NOTE — TELEPHONE ENCOUNTER
Refill Request     Modafinil 200 mg    [facility]    Health Maintenance   Topic Date Due    Hepatitis B vaccine (1 of 3 - 19+ 3-dose series) Never done    Cervical cancer screen  Never done    Shingles vaccine (1 of 2) Never done    Flu vaccine (1) Never done    COVID-19 Vaccine (2 - 2023-24 season) 09/01/2024    Depression Monitoring  08/13/2025    Colorectal Cancer Screen  03/08/2026    Breast cancer screen  11/06/2026    DTaP/Tdap/Td vaccine (2 - Td or Tdap) 11/29/2028    Lipids  08/13/2029    Hepatitis C screen  Completed    HIV screen  Completed    Hepatitis A vaccine  Aged Out    Hib vaccine  Aged Out    Polio vaccine  Aged Out    Meningococcal (ACWY) vaccine  Aged Out    Pneumococcal 0-64 years Vaccine  Aged Out             (applicable per patient's age: Cancer Screenings, Depression Screening, Fall Risk Screening, Immunizations)    Hemoglobin A1C (%)   Date Value   03/03/2023 5.1     AST (U/L)   Date Value   08/13/2024 21     ALT (U/L)   Date Value   08/13/2024 15     BUN (mg/dL)   Date Value   08/13/2024 7      (goal A1C is < 7)   (goal LDL is <100) need 30-50% reduction from baseline     BP Readings from Last 3 Encounters:   10/23/24 138/82   08/13/24 138/80   08/13/24 (!) 141/80    (goal /80)      All Future Testing planned in [facility]:  Lab Frequency Next Occurrence       Next Visit Date:  Future Appointments   Date Time Provider Department Center   12/23/2024  9:20 AM Lindsey Lucero DO [facility]            Patient Active Problem List:     Anxiety     Insomnia     HTN (hypertension)     Venous insufficiency     Overweight

## 2024-11-25 NOTE — TELEPHONE ENCOUNTER
Last visit:  10/23/2024  Next Visit Date:    Future Appointments   Date Time Provider Department Center   12/23/2024  9:20 AM Lindsey Lucero DO [facility]         Medication List:  Prior to Admission medications    Medication Sig Start Date End Date Taking? Authorizing Provider   magnesium oxide (MAG-OX) 400 (240 Mg) MG tablet Take 1 tablet by mouth nightly at bedtime. 11/11/24   Lindsey Lucero DO   citalopram (CELEXA) 40 MG tablet Take 1 tablet by mouth daily 10/23/24   Lindsey Lucero DO   traZODone (DESYREL) 50 MG tablet Take 1-2 tablets by mouth nightly 10/23/24   Lindsey Lucero DO   gabapentin (NEURONTIN) 300 MG capsule take 1 capsule by mouth every morning then 1 capsule every IN THE AFTERNOON and 2 capsules at bedtime 9/19/24 1/30/25  Lindsey Lucero DO   albuterol sulfate HFA (VENTOLIN HFA) 108 (90 Base) MCG/ACT inhaler Inhale 2 puffs into the lungs every 4 hours as needed for Wheezing or Shortness of Breath 8/13/24   Lindsey Lucero DO   buPROPion (WELLBUTRIN XL) 150 MG extended release tablet take 1 tablet by mouth every morning 8/1/24   Lindsey Lucero DO   fluticasone (FLONASE) 50 MCG/ACT nasal spray 2 sprays by Each Nostril route daily 1/24/24   Lindsey Lucero DO   saline nasal gel (AYR) GEL by Nasal route as needed for Congestion 1/24/24   Lindsey Lucero DO   lamoTRIgine (LAMICTAL) 25 MG tablet Take 1 tablet by mouth 2 times daily 11/29/23   Lindsey Lucero DO   Multiple Vitamins-Minerals (HAIR SKIN AND NAILS FORMULA PO) Take by mouth    Nora Barroso MD   TURMERIC PO Take by mouth    Nora Barroso MD   FOLIC ACID PO Take 1,000 mg by mouth daily     Nora Barroso MD   acetaminophen (TYLENOL) 325 MG tablet Take 2 tablets by mouth every 6 hours as needed    Nora Barroso MD

## 2024-11-27 DIAGNOSIS — G93.32 CHRONIC FATIGUE SYNDROME: ICD-10-CM

## 2024-11-27 RX ORDER — MODAFINIL 200 MG/1
200 TABLET ORAL DAILY
Qty: 30 TABLET | Refills: 2 | Status: SHIPPED | OUTPATIENT
Start: 2024-11-27 | End: 2025-02-25

## 2024-11-27 RX ORDER — GABAPENTIN 300 MG/1
CAPSULE ORAL
Qty: 120 CAPSULE | Refills: 2 | Status: SHIPPED | OUTPATIENT
Start: 2024-11-27 | End: 2025-04-09

## 2024-11-27 NOTE — TELEPHONE ENCOUNTER
Last visit:  10/23/2024  Next Visit Date:    Future Appointments   Date Time Provider Department Center   12/23/2024  9:20 AM Lindsey Lucero DO [facility]         Medication List:  Prior to Admission medications    Medication Sig Start Date End Date Taking? Authorizing Provider   modafinil (PROVIGIL) 200 MG tablet Take 1 tablet by mouth daily for 30 days. Max Daily Amount: 200 mg 11/25/24 12/25/24  Lindsey Lucero DO   magnesium oxide (MAG-OX) 400 (240 Mg) MG tablet Take 1 tablet by mouth nightly at bedtime. 11/11/24   Lindsey Lucero DO   citalopram (CELEXA) 40 MG tablet Take 1 tablet by mouth daily 10/23/24   Lindsey Lucero DO   traZODone (DESYREL) 50 MG tablet Take 1-2 tablets by mouth nightly 10/23/24   Lindsey Lucero DO   gabapentin (NEURONTIN) 300 MG capsule take 1 capsule by mouth every morning then 1 capsule every IN THE AFTERNOON and 2 capsules at bedtime 9/19/24 1/30/25  Lindsey Lucero DO   albuterol sulfate HFA (VENTOLIN HFA) 108 (90 Base) MCG/ACT inhaler Inhale 2 puffs into the lungs every 4 hours as needed for Wheezing or Shortness of Breath 8/13/24   Lindsey Lucero DO   buPROPion (WELLBUTRIN XL) 150 MG extended release tablet take 1 tablet by mouth every morning 8/1/24   Lindsey Lucero DO   fluticasone (FLONASE) 50 MCG/ACT nasal spray 2 sprays by Each Nostril route daily 1/24/24   Lindsey Lucero DO   saline nasal gel (AYR) GEL by Nasal route as needed for Congestion 1/24/24   Lindsey Lucero DO   lamoTRIgine (LAMICTAL) 25 MG tablet Take 1 tablet by mouth 2 times daily 11/29/23   Lindsey Lucero DO   Multiple Vitamins-Minerals (HAIR SKIN AND NAILS FORMULA PO) Take by mouth    Nora Barroso MD   TURMERIC PO Take by mouth    Nora Barroso MD   FOLIC ACID PO Take 1,000 mg by mouth daily     Provider, MD Nora   acetaminophen (TYLENOL) 325 MG tablet Take 2 tablets by mouth every 6 hours as needed    Provider, MD Nora

## 2024-12-06 ENCOUNTER — OFFICE VISIT (OUTPATIENT)
Dept: FAMILY MEDICINE CLINIC | Age: 54
End: 2024-12-06
Payer: COMMERCIAL

## 2024-12-06 VITALS
BODY MASS INDEX: 26.07 KG/M2 | WEIGHT: 172 LBS | TEMPERATURE: 99 F | DIASTOLIC BLOOD PRESSURE: 78 MMHG | HEIGHT: 68 IN | OXYGEN SATURATION: 95 % | HEART RATE: 86 BPM | SYSTOLIC BLOOD PRESSURE: 116 MMHG

## 2024-12-06 DIAGNOSIS — J01.00 ACUTE NON-RECURRENT MAXILLARY SINUSITIS: Primary | ICD-10-CM

## 2024-12-06 PROCEDURE — 3078F DIAST BP <80 MM HG: CPT | Performed by: NURSE PRACTITIONER

## 2024-12-06 PROCEDURE — 99213 OFFICE O/P EST LOW 20 MIN: CPT | Performed by: NURSE PRACTITIONER

## 2024-12-06 PROCEDURE — 3074F SYST BP LT 130 MM HG: CPT | Performed by: NURSE PRACTITIONER

## 2024-12-06 RX ORDER — ZINC SULFATE 50(220)MG
50 CAPSULE ORAL DAILY
COMMUNITY

## 2024-12-06 RX ORDER — ASCORBIC ACID 500 MG
500 TABLET ORAL DAILY
COMMUNITY

## 2024-12-06 RX ORDER — HYDROCODONE BITARTRATE AND HOMATROPINE METHYLBROMIDE ORAL SOLUTION 5; 1.5 MG/5ML; MG/5ML
5 LIQUID ORAL 4 TIMES DAILY PRN
Qty: 140 ML | Refills: 0 | Status: SHIPPED | OUTPATIENT
Start: 2024-12-06 | End: 2024-12-13

## 2024-12-06 ASSESSMENT — ENCOUNTER SYMPTOMS
VOMITING: 0
NAUSEA: 1
COUGH: 1
SHORTNESS OF BREATH: 0
DIARRHEA: 0
RHINORRHEA: 1

## 2024-12-06 NOTE — PROGRESS NOTES
water intake  Rest                    Completed Refills   Requested Prescriptions     Signed Prescriptions Disp Refills    amoxicillin-clavulanate (AUGMENTIN) 875-125 MG per tablet 14 tablet 0     Sig: Take 1 tablet by mouth 2 times daily for 7 days    HYDROcodone homatropine (HYCODAN) 5-1.5 MG/5ML solution 140 mL 0     Sig: Take 5 mLs by mouth 4 times daily as needed (cough) for up to 7 days. Max Daily Amount: 20 mLs     Return if symptoms worsen or fail to improve.  Orders Placed This Encounter   Medications    amoxicillin-clavulanate (AUGMENTIN) 875-125 MG per tablet     Sig: Take 1 tablet by mouth 2 times daily for 7 days     Dispense:  14 tablet     Refill:  0    HYDROcodone homatropine (HYCODAN) 5-1.5 MG/5ML solution     Sig: Take 5 mLs by mouth 4 times daily as needed (cough) for up to 7 days. Max Daily Amount: 20 mLs     Dispense:  140 mL     Refill:  0     Reduce doses taken as pain becomes manageable     No orders of the defined types were placed in this encounter.        There are no Patient Instructions on file for this visit.    Electronically signed by René Zarate DNP,APRN,CNP  on 12/6/2024 at 9:25 AM           Completed Refills   Requested Prescriptions     Signed Prescriptions Disp Refills    amoxicillin-clavulanate (AUGMENTIN) 875-125 MG per tablet 14 tablet 0     Sig: Take 1 tablet by mouth 2 times daily for 7 days    HYDROcodone homatropine (HYCODAN) 5-1.5 MG/5ML solution 140 mL 0     Sig: Take 5 mLs by mouth 4 times daily as needed (cough) for up to 7 days. Max Daily Amount: 20 mLs

## 2024-12-13 RX ORDER — ALBUTEROL SULFATE 90 UG/1
2 INHALANT RESPIRATORY (INHALATION) EVERY 4 HOURS PRN
Qty: 18 G | Refills: 0 | Status: SHIPPED | OUTPATIENT
Start: 2024-12-13

## 2024-12-13 NOTE — TELEPHONE ENCOUNTER
Last OV: 12/6/2024  sinus 10/23/24 insomnia, anxiety and asthma   Last RX:    Next scheduled apt: 12/23/2024  2 months anxiety           Surescript requesting a refill

## 2024-12-18 ENCOUNTER — TELEPHONE (OUTPATIENT)
Dept: FAMILY MEDICINE CLINIC | Age: 54
End: 2024-12-18

## 2024-12-18 RX ORDER — AZITHROMYCIN 250 MG/1
TABLET, FILM COATED ORAL
Qty: 6 TABLET | Refills: 0 | Status: SHIPPED | OUTPATIENT
Start: 2024-12-18 | End: 2024-12-28

## 2024-12-18 NOTE — TELEPHONE ENCOUNTER
Irene was in the office on 12/6 for sinusitis and prescribed augmentin and hycodan cough medicine. She is calling in today still coughing, sore throat on her left side and her sinuses are still draining. Can something else be called in for her?    Dm-liz    Health Maintenance   Topic Date Due    Hepatitis B vaccine (1 of 3 - 19+ 3-dose series) Never done    Cervical cancer screen  Never done    Shingles vaccine (1 of 2) Never done    Flu vaccine (1) Never done    COVID-19 Vaccine (2 - 2023-24 season) 09/01/2024    Depression Monitoring  08/13/2025    Colorectal Cancer Screen  03/08/2026    Breast cancer screen  11/06/2026    DTaP/Tdap/Td vaccine (2 - Td or Tdap) 11/29/2028    Lipids  08/13/2029    Hepatitis C screen  Completed    HIV screen  Completed    Hepatitis A vaccine  Aged Out    Hib vaccine  Aged Out    Polio vaccine  Aged Out    Meningococcal (ACWY) vaccine  Aged Out    Pneumococcal 0-64 years Vaccine  Aged Out             (applicable per patient's age: Cancer Screenings, Depression Screening, Fall Risk Screening, Immunizations)    Hemoglobin A1C (%)   Date Value   03/03/2023 5.1     AST (U/L)   Date Value   08/13/2024 21     ALT (U/L)   Date Value   08/13/2024 15     BUN (mg/dL)   Date Value   08/13/2024 7      (goal A1C is < 7)   (goal LDL is <100) need 30-50% reduction from baseline     BP Readings from Last 3 Encounters:   12/06/24 116/78   10/23/24 138/82   08/13/24 138/80    (goal /80)      All Future Testing planned in [facility]:  Lab Frequency Next Occurrence       Next Visit Date:  Future Appointments   Date Time Provider Department Center   12/23/2024  9:20 AM Lindsey Lucero DO [facility]            Patient Active Problem List:     Anxiety     Insomnia     HTN (hypertension)     Venous insufficiency     Overweight

## 2025-01-06 DIAGNOSIS — G93.32 CHRONIC FATIGUE SYNDROME: ICD-10-CM

## 2025-01-06 RX ORDER — FLUTICASONE PROPIONATE 50 MCG
2 SPRAY, SUSPENSION (ML) NASAL DAILY
Qty: 1 EACH | Refills: 1 | Status: SHIPPED | OUTPATIENT
Start: 2025-01-06

## 2025-01-06 RX ORDER — MODAFINIL 200 MG/1
200 TABLET ORAL DAILY
Qty: 30 TABLET | Refills: 2 | Status: SHIPPED | OUTPATIENT
Start: 2025-01-06 | End: 2025-04-06

## 2025-01-06 NOTE — TELEPHONE ENCOUNTER
Last OV: 12/6/2024  sinus   Last RX:    Next scheduled apt: 1/14/2025  2 months anxiety           Pt requesting a refill   Medication pending for approval

## 2025-01-27 DIAGNOSIS — F41.9 ANXIETY: ICD-10-CM

## 2025-01-27 RX ORDER — ALPRAZOLAM 0.5 MG
0.5 TABLET ORAL 3 TIMES DAILY PRN
Qty: 60 TABLET | Refills: 0 | Status: SHIPPED | OUTPATIENT
Start: 2025-01-27 | End: 2025-02-26

## 2025-01-27 NOTE — TELEPHONE ENCOUNTER
Rescheduled, refill xanax            Last visit:  12/6/2024  Next Visit Date:    Future Appointments   Date Time Provider Department Center   1/31/2025  1:00 PM Lindsey Lucero DO [facility]         Medication List:  Prior to Admission medications    Medication Sig Start Date End Date Taking? Authorizing Provider   fluticasone (FLONASE) 50 MCG/ACT nasal spray 2 sprays by Each Nostril route daily 1/6/25   Lindsey Lucero DO   modafinil (PROVIGIL) 200 MG tablet Take 1 tablet by mouth daily for 90 days. Max Daily Amount: 200 mg 1/6/25 4/6/25  Lindsey Lucero DO   albuterol sulfate HFA (PROVENTIL;VENTOLIN;PROAIR) 108 (90 Base) MCG/ACT inhaler INHALE 2 PUFFS into the lungs EVERY 4 HOURS AS NEEDED FOR WHEEZING or SHORTNESS OF BREATH 12/13/24   Lindsey Lucero DO   vitamin C (ASCORBIC ACID) 500 MG tablet Take 1 tablet by mouth daily    ProviderNora MD   zinc sulfate (ZINCATE) 220 (50 Zn)  mg capsule - elemental zinc Take 1 capsule by mouth daily    ProviderNora MD   gabapentin (NEURONTIN) 300 MG capsule take 1 capsule by mouth every morning then 1 capsule every IN THE AFTERNOON and 2 capsules at bedtime 11/27/24 4/9/25  Lindsey Lucero DO   magnesium oxide (MAG-OX) 400 (240 Mg) MG tablet Take 1 tablet by mouth nightly at bedtime. 11/11/24   Lindsey Lucero DO   citalopram (CELEXA) 40 MG tablet Take 1 tablet by mouth daily 10/23/24   Lindsey Lucero DO   traZODone (DESYREL) 50 MG tablet Take 1-2 tablets by mouth nightly 10/23/24   Lindsey Lucero DO   buPROPion (WELLBUTRIN XL) 150 MG extended release tablet take 1 tablet by mouth every morning 8/1/24   Lindsey Lucero DO   saline nasal gel (AYR) GEL by Nasal route as needed for Congestion 1/24/24   Lindsey Lucero DO   lamoTRIgine (LAMICTAL) 25 MG tablet Take 1 tablet by mouth 2 times daily 11/29/23   Lindsey Lucero, DO   Multiple Vitamins-Minerals (HAIR SKIN AND NAILS FORMULA PO) Take by mouth

## 2025-02-18 DIAGNOSIS — G93.32 CHRONIC FATIGUE SYNDROME: ICD-10-CM

## 2025-02-18 RX ORDER — ALBUTEROL SULFATE 90 UG/1
2 INHALANT RESPIRATORY (INHALATION) EVERY 4 HOURS PRN
Qty: 18 G | Refills: 5 | Status: SHIPPED | OUTPATIENT
Start: 2025-02-18

## 2025-02-18 RX ORDER — TRAZODONE HYDROCHLORIDE 50 MG/1
50-100 TABLET ORAL NIGHTLY
Qty: 60 TABLET | Refills: 2 | Status: SHIPPED | OUTPATIENT
Start: 2025-02-18

## 2025-02-18 RX ORDER — MODAFINIL 200 MG/1
200 TABLET ORAL DAILY
Qty: 30 TABLET | Refills: 2 | Status: SHIPPED | OUTPATIENT
Start: 2025-02-18 | End: 2025-05-19

## 2025-02-18 NOTE — TELEPHONE ENCOUNTER
Last visit:  12/6/2024  Next Visit Date:  No future appointments.      Medication List:  Prior to Admission medications    Medication Sig Start Date End Date Taking? Authorizing Provider   ALPRAZolam (XANAX) 0.5 MG tablet Take 1 tablet by mouth 3 times daily as needed for Anxiety for up to 30 days. 1/27/25 2/26/25  Lindsey Lucero DO   fluticasone (FLONASE) 50 MCG/ACT nasal spray 2 sprays by Each Nostril route daily 1/6/25   Lindsey Lucero DO   modafinil (PROVIGIL) 200 MG tablet Take 1 tablet by mouth daily for 90 days. Max Daily Amount: 200 mg 1/6/25 4/6/25  Lindsey Lucero DO   albuterol sulfate HFA (PROVENTIL;VENTOLIN;PROAIR) 108 (90 Base) MCG/ACT inhaler INHALE 2 PUFFS into the lungs EVERY 4 HOURS AS NEEDED FOR WHEEZING or SHORTNESS OF BREATH 12/13/24   Lindsey Lucero DO   vitamin C (ASCORBIC ACID) 500 MG tablet Take 1 tablet by mouth daily    Provider, MD Nora   zinc sulfate (ZINCATE) 220 (50 Zn)  mg capsule - elemental zinc Take 1 capsule by mouth daily    ProviderNora MD   gabapentin (NEURONTIN) 300 MG capsule take 1 capsule by mouth every morning then 1 capsule every IN THE AFTERNOON and 2 capsules at bedtime 11/27/24 4/9/25  Lindsey Lucero DO   magnesium oxide (MAG-OX) 400 (240 Mg) MG tablet Take 1 tablet by mouth nightly at bedtime. 11/11/24   Lindsey Lucero DO   citalopram (CELEXA) 40 MG tablet Take 1 tablet by mouth daily 10/23/24   Lindsey Lucero DO   traZODone (DESYREL) 50 MG tablet Take 1-2 tablets by mouth nightly 10/23/24   Lindsey Lucero DO   buPROPion (WELLBUTRIN XL) 150 MG extended release tablet take 1 tablet by mouth every morning 8/1/24   Lindsey Lucero DO   saline nasal gel (AYR) GEL by Nasal route as needed for Congestion 1/24/24   Yonley, Lindsey L, DO   lamoTRIgine (LAMICTAL) 25 MG tablet Take 1 tablet by mouth 2 times daily 11/29/23   Lindsey Lucero, DO   Multiple Vitamins-Minerals (HAIR SKIN AND NAILS FORMULA PO) Take by

## 2025-02-21 DIAGNOSIS — G93.32 CHRONIC FATIGUE SYNDROME: ICD-10-CM

## 2025-02-24 RX ORDER — MODAFINIL 200 MG/1
200 TABLET ORAL DAILY
Qty: 30 TABLET | Refills: 2 | OUTPATIENT
Start: 2025-02-24 | End: 2025-05-25

## 2025-03-01 NOTE — ED PROVIDER NOTES
[facility]  [address], [address]  Phone: [place] [phone] COMPLAINT    Chief Complaint   Patient presents with    Positive For Covid-19     Pt states she took 2 COVID home tests and were positive, states she feel SOB, no energy, loss of appetitte, and panic attack. HPI    Cha Alfaro is a 54 y.o. female who presents noted complaint. Patient's been doing pretty good. Has developed cough congestion not feeling good since Saturday. Otherwise been doing okay maybe a little loose stools at times some nausea at times. Denies fuentes diarrhea weakness syncope or other problems. She had asthma as a child. She took 2 home COVID test which were positive. PAST MEDICAL HISTORY    Past Medical History:   Diagnosis Date    Anxiety     Arthritis     Asthma     as child    Depression     Osteoarthritis     Ulcer     stomach    Wears glasses        SURGICAL HISTORY    Past Surgical History:   Procedure Laterality Date     SECTION      CHOLECYSTECTOMY      CHOLECYSTECTOMY, LAPAROSCOPIC N/A 2017    CHOLECYSTECTOMY LAPAROSCOPIC; photos performed by Boaz Davila MD at [address] 2021    L 4-5 RIGHT INTERLAMINAR EPIDURAL INJECTION-  IMAGING GUIDANCE performed by Elvia Carrion MD at [address] EXTRACTION         CURRENT MEDICATIONS    Current Outpatient Rx   Medication Sig Dispense Refill    promethazine-codeine (PHENERGAN WITH CODEINE) 6.25-10 MG/5ML syrup Take 5 mLs by mouth 4 times daily as needed for Cough for up to 7 days.  118 mL 0    naproxen (EC-NAPROSYN) 500 MG EC tablet Take 1 tablet by mouth 2 times daily (with meals) for 14 days 28 tablet 0    amphetamine-dextroamphetamine (ADDERALL XR) 15 MG extended release capsule TAKE 1 CAPSULE BY MOUTH DAILY 30 capsule 0    lamoTRIgine (LAMICTAL) 25 MG tablet take 1 tablet by mouth twice a day 60 tablet 5    citalopram (CELEXA) 40 MG tablet take 1 tablet by mouth once daily 90 tablet 1    gabapentin (NEURONTIN) 300 MG capsule 300 mg every morning, 300 mg every afternoon, 600 mg every evening at bedtime 120 capsule 2    magnesium oxide (MAG-OX) 400 MG tablet Take 1 tablet by mouth at bedtime 30 tablet 5    Vitamin D-Vitamin K (DOSOQUIN) 5500-200 UNIT-MCG TABS take 1 tablet by mouth daily 90 tablet 1    pantoprazole (PROTONIX) 40 MG tablet Take 1 tablet by mouth every morning (before breakfast) 30 tablet 5    buPROPion (WELLBUTRIN XL) 150 MG extended release tablet Take 1 tablet by mouth every morning 90 tablet 1    clobetasol (TEMOVATE) 0.05 % cream apply to affected area twice a day for 30 DAYS then if needed      estradiol (ESTRACE) 0.1 MG/GM vaginal cream apply ONE PEA SIZE DOLLOP ON EXTERNAL VAGINAL AREA AND SWIPE INTO. ..  (REFER TO PRESCRIPTION NOTES).  diclofenac sodium (VOLTAREN) 1 % GEL Apply topically 2 times daily      Multiple Vitamins-Minerals (HAIR SKIN AND NAILS FORMULA PO) Take by mouth      TURMERIC PO Take by mouth      FOLIC ACID PO Take 1,998 mg by mouth daily       saline nasal gel (AYR) GEL by Nasal route as needed for Congestion 1 Tube 3    acetaminophen (TYLENOL) 325 MG tablet Take 650 mg by mouth every 6 hours as needed.            ALLERGIES    Allergies   Allergen Reactions    Vicodin [Hydrocodone-Acetaminophen] Nausea And Vomiting       FAMILY HISTORY    Family History   Problem Relation Age of Onset    Diabetes Mother     Cancer Mother         Liver Cancer    Heart Disease Father        SOCIAL HISTORY    Social History     Socioeconomic History    Marital status:      Spouse name: None    Number of children: None    Years of education: None    Highest education level: None   Occupational History    None   Tobacco Use    Smoking status: Former Smoker     Years: 6.00     Quit date: 5/1/2001     Years since quittin.0    Smokeless tobacco: Never Used   Vaping Use    Vaping Use: Never used   Substance and Sexual Activity    Alcohol use: No    Drug use: No    Sexual activity: Yes     Partners: Male   Other Topics Concern    None   Social History Narrative    None     Social Determinants of Health     Financial Resource Strain: Low Risk     Difficulty of Paying Living Expenses: Not hard at all   Food Insecurity: No Food Insecurity    Worried About Running Out of Food in the Last Year: Never true    Genie of Food in the Last Year: Never true   Transportation Needs:     Lack of Transportation (Medical): Not on file    Lack of Transportation (Non-Medical): Not on file   Physical Activity:     Days of Exercise per Week: Not on file    Minutes of Exercise per Session: Not on file   Stress:     Feeling of Stress : Not on file   Social Connections:     Frequency of Communication with Friends and Family: Not on file    Frequency of Social Gatherings with Friends and Family: Not on file    Attends Religion Services: Not on file    Active Member of [address] [place] [address] or Organizations: Not on file    Attends Club or Organization Meetings: Not on file    Marital Status: Not on file   Intimate Partner Violence:     Fear of Current or Ex-Partner: Not on file    Emotionally Abused: Not on file    Physically Abused: Not on file    Sexually Abused: Not on file   Housing Stability:     Unable to Pay for Housing in the Last Year: Not on file    Number of [place] in the Last Year: Not on file    Unstable Housing in the Last Year: Not on file       REVIEW OF SYSTEMS    Possible URI and cough. No vomiting or diarrhea. No urinary symptoms. Some loose stools  All systems negative except as marked.      PHYSICAL EXAM    VITAL SIGNS: BP (!) 141/82   Pulse 75   Temp 98.6 °F (37 °C) (Oral)   Resp 18   Ht 5' 8\" (1.727 m)   Wt 184 lb (83.5 kg)   LMP 10/03/2017 (Exact Date)   SpO2 100%   BMI 27.98 kg/m² Constitutional:  Alert not toxtic or ill, does not alert no conversational dyspnea  HENT:  Normocephalic, Atraumatic, oropharynx normal, TMs normal  Cervical Spine: Normal range of motion,  No stridor. Eyes:  No discharge or  Swelling  Respiratory: No respiratory distress, clear  Cardio normal  Musculoskeletal:  Intact distal pulses, No edema, No tenderness, No cyanosis, No clubbing. . No tenderness to palpation or major deformities noted. Integument:  Warm, Dry, No erythema, No rash (on exposed areas)   Neurologic:  Alert & appropriate   Psychiatric:  Affect normal    EKG                      RADIOLOGY    No orders to display           SCREENINGS  BP (!) 141/82   Pulse 75   Temp 98.6 °F (37 °C) (Oral)   Resp 18   Ht 5' 8\" (1.727 m)   Wt 184 lb (83.5 kg)   LMP 10/03/2017 (Exact Date)   SpO2 100%   BMI 27.98 kg/m²      No orders to display       Screening For Hypertension and Follow-up (#317)  patient informed that blood pressure is abnormal and in the pre-hypertension range and should be rechecked by primary care      Screening For Tobacco Use and Cessation Intervention (#226):   reports that she quit smoking about 21 years ago. She quit after 6.00 years of use. She has never used smokeless tobacco.  Non-smoker not applicable for screen            PROCEDURES    none      CONSULTS:  None        ED COURSE & MEDICAL DECISION MAKING    Pertinent Labs & Imaging studies reviewed. (See chart for details)  Patient has URI cough congestion. 2 home COVID tests were positive. Her vital signs are stable here including oxygen level 100%, pulse of 75 although blood pressure slightly elevated 141/82. Counseled the patient regards to care and treatment for COVID at this time. She has clear lungs. She is already on day 5. Give her recommendations from CDC in regards to masking for the next 5 days. Treat supportively at home with some Phenergan with codeine. She needs close follow-up with her PCP.           FINAL IMPRESSION    1. COVID         PATIENT REFERRED TO:  Judge Chelsey DO  [address]     Call   For evaluation      DISCHARGE MEDICATIONS:  New Prescriptions    PROMETHAZINE-CODEINE (PHENERGAN WITH CODEINE) 6.25-10 MG/5ML SYRUP    Take 5 mLs by mouth 4 times daily as needed for Cough for up to 7 days.            Lisa Chandler MD  05/18/22 [id number] Kaela Sykes RN

## 2025-03-17 DIAGNOSIS — F41.9 ANXIETY: ICD-10-CM

## 2025-03-17 RX ORDER — GABAPENTIN 300 MG/1
CAPSULE ORAL
Qty: 120 CAPSULE | Refills: 2 | Status: SHIPPED | OUTPATIENT
Start: 2025-03-17 | End: 2025-07-27

## 2025-03-17 RX ORDER — CITALOPRAM HYDROBROMIDE 40 MG/1
40 TABLET ORAL DAILY
Qty: 90 TABLET | Refills: 1 | Status: SHIPPED | OUTPATIENT
Start: 2025-03-17

## 2025-03-17 RX ORDER — ALPRAZOLAM 0.5 MG
0.5 TABLET ORAL 3 TIMES DAILY PRN
Qty: 60 TABLET | Refills: 0 | Status: SHIPPED | OUTPATIENT
Start: 2025-03-17 | End: 2025-04-16

## 2025-03-17 NOTE — TELEPHONE ENCOUNTER
Last visit:  12/6/2024  Next Visit Date:  No future appointments.      Medication List:  Prior to Admission medications    Medication Sig Start Date End Date Taking? Authorizing Provider   traZODone (DESYREL) 50 MG tablet Take 1-2 tablets by mouth nightly 2/18/25   Lindsey Lucero DO   albuterol sulfate HFA (PROVENTIL;VENTOLIN;PROAIR) 108 (90 Base) MCG/ACT inhaler Inhale 2 puffs into the lungs every 4 hours as needed for Wheezing or Shortness of Breath 2/18/25   Lindsey Lucero DO   modafinil (PROVIGIL) 200 MG tablet Take 1 tablet by mouth daily for 90 days. Max Daily Amount: 200 mg 2/18/25 5/19/25  Lindsey Lucero DO   fluticasone (FLONASE) 50 MCG/ACT nasal spray 2 sprays by Each Nostril route daily 1/6/25   Lindsey Lucero DO   vitamin C (ASCORBIC ACID) 500 MG tablet Take 1 tablet by mouth daily    ProviderNora MD   zinc sulfate (ZINCATE) 220 (50 Zn)  mg capsule - elemental zinc Take 1 capsule by mouth daily    Nora Barroso MD   gabapentin (NEURONTIN) 300 MG capsule take 1 capsule by mouth every morning then 1 capsule every IN THE AFTERNOON and 2 capsules at bedtime 11/27/24 4/9/25  Lindsey Lucero DO   magnesium oxide (MAG-OX) 400 (240 Mg) MG tablet Take 1 tablet by mouth nightly at bedtime. 11/11/24   Lindsey Lucero DO   citalopram (CELEXA) 40 MG tablet Take 1 tablet by mouth daily 10/23/24   Lindsey Lucero DO   buPROPion (WELLBUTRIN XL) 150 MG extended release tablet take 1 tablet by mouth every morning 8/1/24   Lindsey Lucero DO   saline nasal gel (AYR) GEL by Nasal route as needed for Congestion 1/24/24   Lindsey Lucero DO   lamoTRIgine (LAMICTAL) 25 MG tablet Take 1 tablet by mouth 2 times daily 11/29/23   Lindsey Lucero DO   Multiple Vitamins-Minerals (HAIR SKIN AND NAILS FORMULA PO) Take by mouth    Nora Barroso, MD   TURMERIC PO Take by mouth    Provider, Historical, MD   FOLIC ACID PO Take 1,000 mg by mouth daily     Provider,

## 2025-03-17 NOTE — TELEPHONE ENCOUNTER
Patient is dealing with her sister having cancer and taking care of her along with dealing with \"other things\"  so she has been taking the xanax 3 times daily at times

## 2025-03-17 NOTE — TELEPHONE ENCOUNTER
MD Nora   acetaminophen (TYLENOL) 325 MG tablet Take 2 tablets by mouth every 6 hours as needed    Provider, MD Nora

## 2025-03-17 NOTE — TELEPHONE ENCOUNTER
Does she need this? Can refill if needed but it's a lot sooner than previous, has been about 6 wks but last time had been close to 6 mos.

## 2025-03-26 ENCOUNTER — OFFICE VISIT (OUTPATIENT)
Dept: FAMILY MEDICINE CLINIC | Age: 55
End: 2025-03-26

## 2025-03-26 VITALS
OXYGEN SATURATION: 98 % | HEIGHT: 68 IN | DIASTOLIC BLOOD PRESSURE: 78 MMHG | WEIGHT: 175 LBS | HEART RATE: 85 BPM | SYSTOLIC BLOOD PRESSURE: 134 MMHG | BODY MASS INDEX: 26.52 KG/M2

## 2025-03-26 DIAGNOSIS — G93.32 CHRONIC FATIGUE SYNDROME: ICD-10-CM

## 2025-03-26 DIAGNOSIS — F51.01 PRIMARY INSOMNIA: Primary | ICD-10-CM

## 2025-03-26 DIAGNOSIS — F32.A ANXIETY AND DEPRESSION: ICD-10-CM

## 2025-03-26 DIAGNOSIS — L57.0 ACTINIC KERATOSIS: ICD-10-CM

## 2025-03-26 DIAGNOSIS — F41.9 ANXIETY AND DEPRESSION: ICD-10-CM

## 2025-03-26 RX ORDER — BUPROPION HYDROCHLORIDE 150 MG/1
150 TABLET ORAL EVERY MORNING
Qty: 90 TABLET | Refills: 1 | Status: SHIPPED | OUTPATIENT
Start: 2025-03-26

## 2025-03-26 SDOH — ECONOMIC STABILITY: FOOD INSECURITY: WITHIN THE PAST 12 MONTHS, YOU WORRIED THAT YOUR FOOD WOULD RUN OUT BEFORE YOU GOT MONEY TO BUY MORE.: NEVER TRUE

## 2025-03-26 SDOH — ECONOMIC STABILITY: FOOD INSECURITY: WITHIN THE PAST 12 MONTHS, THE FOOD YOU BOUGHT JUST DIDN'T LAST AND YOU DIDN'T HAVE MONEY TO GET MORE.: NEVER TRUE

## 2025-03-26 ASSESSMENT — PATIENT HEALTH QUESTIONNAIRE - PHQ9
7. TROUBLE CONCENTRATING ON THINGS, SUCH AS READING THE NEWSPAPER OR WATCHING TELEVISION: NOT AT ALL
4. FEELING TIRED OR HAVING LITTLE ENERGY: NOT AT ALL
1. LITTLE INTEREST OR PLEASURE IN DOING THINGS: NOT AT ALL
SUM OF ALL RESPONSES TO PHQ QUESTIONS 1-9: 0
SUM OF ALL RESPONSES TO PHQ QUESTIONS 1-9: 0
5. POOR APPETITE OR OVEREATING: NOT AT ALL
2. FEELING DOWN, DEPRESSED OR HOPELESS: NOT AT ALL
SUM OF ALL RESPONSES TO PHQ QUESTIONS 1-9: 0
9. THOUGHTS THAT YOU WOULD BE BETTER OFF DEAD, OR OF HURTING YOURSELF: NOT AT ALL
SUM OF ALL RESPONSES TO PHQ QUESTIONS 1-9: 0
10. IF YOU CHECKED OFF ANY PROBLEMS, HOW DIFFICULT HAVE THESE PROBLEMS MADE IT FOR YOU TO DO YOUR WORK, TAKE CARE OF THINGS AT HOME, OR GET ALONG WITH OTHER PEOPLE: NOT DIFFICULT AT ALL
3. TROUBLE FALLING OR STAYING ASLEEP: NOT AT ALL
8. MOVING OR SPEAKING SO SLOWLY THAT OTHER PEOPLE COULD HAVE NOTICED. OR THE OPPOSITE, BEING SO FIGETY OR RESTLESS THAT YOU HAVE BEEN MOVING AROUND A LOT MORE THAN USUAL: NOT AT ALL
6. FEELING BAD ABOUT YOURSELF - OR THAT YOU ARE A FAILURE OR HAVE LET YOURSELF OR YOUR FAMILY DOWN: NOT AT ALL

## 2025-03-26 NOTE — PROGRESS NOTES
Name: Irene Galvez  : 1970         Chief Complaint:     Chief Complaint   Patient presents with    Hypertension    Anxiety     Taking xanax daily    Mole     Left cheek       History of Present Illness:      Irene Galvez is a 54 y.o.  female who presents with Hypertension, Anxiety (Taking xanax daily), and Mole (Left cheek)      HPI    F/u anxiety. Has been struggling, sister passed away 12 wks ago from cancer and pt (along with another sister) had taken care of her for 3 wks at home on hospice before she . 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 Coworkers say she's \"OCD\" about keeping things clean. Missing son and he lives in [place] now but hasn't been willing to arrange time to visit/have her visit. Typically takes xanax either before work or right after work. Tries to just stay busy to keep mind off of things. Would prefer to stay in bed but doesn't sleep well. When she gets in bed she does get palpitations, feels heart skip. Unsure about lamictal, doesn't remember anything about it.     Insomnia - stopped trazodone as it was making her groggy and off-balance in the morning, and those symptoms did then resolve. Takes an OTC \"PM\" pill here and there, otherwise falling asleep ok. Sleeps about 4h/night. Takes provigil during the day which helps, anne-marie for work. Does feel she was even more alert with Adderall and hasn't had any side effects to either.    Feeling ok physically otherwise. Does have skin lesion L cheek that has been present for a long time but past few mos has nicki persistently crusted. No bleeding. Gets sore.     Medical History:     Patient Active Problem List   Diagnosis    Anxiety    Insomnia    HTN (hypertension)    Venous insufficiency    Overweight       Medications:       Prior to Admission medications    Medication Sig Start Date End Date Taking?

## 2025-04-17 ENCOUNTER — OFFICE VISIT (OUTPATIENT)
Dept: FAMILY MEDICINE CLINIC | Age: 55
End: 2025-04-17
Payer: COMMERCIAL

## 2025-04-17 VITALS
BODY MASS INDEX: 26.11 KG/M2 | DIASTOLIC BLOOD PRESSURE: 70 MMHG | OXYGEN SATURATION: 98 % | WEIGHT: 171.7 LBS | TEMPERATURE: 98.1 F | SYSTOLIC BLOOD PRESSURE: 126 MMHG | HEART RATE: 77 BPM

## 2025-04-17 DIAGNOSIS — J01.40 ACUTE NON-RECURRENT PANSINUSITIS: Primary | ICD-10-CM

## 2025-04-17 PROCEDURE — 3078F DIAST BP <80 MM HG: CPT | Performed by: NURSE PRACTITIONER

## 2025-04-17 PROCEDURE — 3017F COLORECTAL CA SCREEN DOC REV: CPT | Performed by: NURSE PRACTITIONER

## 2025-04-17 PROCEDURE — 99213 OFFICE O/P EST LOW 20 MIN: CPT | Performed by: NURSE PRACTITIONER

## 2025-04-17 PROCEDURE — G8427 DOCREV CUR MEDS BY ELIG CLIN: HCPCS | Performed by: NURSE PRACTITIONER

## 2025-04-17 PROCEDURE — 1036F TOBACCO NON-USER: CPT | Performed by: NURSE PRACTITIONER

## 2025-04-17 PROCEDURE — 3074F SYST BP LT 130 MM HG: CPT | Performed by: NURSE PRACTITIONER

## 2025-04-17 PROCEDURE — G8419 CALC BMI OUT NRM PARAM NOF/U: HCPCS | Performed by: NURSE PRACTITIONER

## 2025-04-17 RX ORDER — HYDROCODONE BITARTRATE AND HOMATROPINE METHYLBROMIDE ORAL SOLUTION 5; 1.5 MG/5ML; MG/5ML
5 LIQUID ORAL 4 TIMES DAILY PRN
Qty: 140 ML | Refills: 0 | Status: SHIPPED | OUTPATIENT
Start: 2025-04-17 | End: 2025-04-24

## 2025-04-17 ASSESSMENT — ENCOUNTER SYMPTOMS
NAUSEA: 0
SORE THROAT: 1
CONSTIPATION: 0
RHINORRHEA: 1
BACK PAIN: 0
VOMITING: 0
DIARRHEA: 0
SHORTNESS OF BREATH: 0
COUGH: 1
BLOOD IN STOOL: 0
SINUS PAIN: 1
ABDOMINAL PAIN: 0
SINUS PRESSURE: 1

## 2025-04-17 NOTE — PROGRESS NOTES
HPI Notes    Name: Irene Galvez  : 1970         Chief Complaint:     Chief Complaint   Patient presents with    Cough     Pt states she is coughing up drainage, post nasal drainage sore throat, no fever, sinus pressure headache started 6 days ago taking mucinex with no relief        History of Present Illness:        HPI  Pt is a 53 y/o F who presents due to concerns of a cough. She endorses this began on Saturday (). Pt reports she was starting to feel sick on Friday (). She endorses her throat began bothering her on Friday morning. Pt endorses her symptoms seem to be worsening. She endorses sick contacts at work. Pt has tried Mucinex, FloNase, and Nasal Saline spray with minimal relief.     Past Medical History:     Past Medical History:   Diagnosis Date    Anxiety     Arthritis     Asthma     as child    Depression     Osteoarthritis     Ulcer     stomach    Wears glasses       Reviewed all health maintenance requirements and ordered appropriate tests  Health Maintenance Due   Topic Date Due    Hepatitis B vaccine (1 of 3 - 19+ 3-dose series) Never done    Cervical cancer screen  Never done    Shingles vaccine (1 of 2) Never done    Pneumococcal 50+ years Vaccine (1 of 1 - PCV) Never done    COVID-19 Vaccine (2 - - season) 2024       Past Surgical History:     Past Surgical History:   Procedure Laterality Date     SECTION      CHOLECYSTECTOMY      CHOLECYSTECTOMY, LAPAROSCOPIC N/A 2017    CHOLECYSTECTOMY LAPAROSCOPIC; photos performed by Serafin Henderson MD at [facility] OR    PAIN MANAGEMENT PROCEDURE Right 2021    L 4-5 RIGHT INTERLAMINAR EPIDURAL INJECTION-  IMAGING GUIDANCE performed by Jareth Steward MD at [facility] OR    UPPER GASTROINTESTINAL ENDOSCOPY      [facility] TOOTH EXTRACTION          Medications:       Prior to Admission medications    Medication Sig Start Date End Date Taking? Authorizing Provider   amoxicillin-clavulanate (AUGMENTIN) [phone] 
Speaking Coherently

## 2025-04-21 ENCOUNTER — TELEPHONE (OUTPATIENT)
Dept: FAMILY MEDICINE CLINIC | Age: 55
End: 2025-04-21

## 2025-04-21 RX ORDER — PREDNISONE 20 MG/1
40 TABLET ORAL DAILY
Qty: 10 TABLET | Refills: 0 | Status: SHIPPED | OUTPATIENT
Start: 2025-04-21 | End: 2025-04-26

## 2025-04-21 NOTE — TELEPHONE ENCOUNTER
Pt was seen on 4/17 for acute visit w/Kurtis, prescribed augmentin and has approx 2 days left. She was instructed to change from mucinex to sudafed, which she did. Pt states she still has a cough (non-productive), headache, sinuses feel full, pressure behind eyes/nose. No new symptoms/fever etc.

## 2025-04-23 DIAGNOSIS — G93.32 CHRONIC FATIGUE SYNDROME: ICD-10-CM

## 2025-04-23 RX ORDER — FLUTICASONE PROPIONATE 50 MCG
2 SPRAY, SUSPENSION (ML) NASAL DAILY
Qty: 1 EACH | Refills: 1 | Status: SHIPPED | OUTPATIENT
Start: 2025-04-23

## 2025-04-23 RX ORDER — MODAFINIL 200 MG/1
200 TABLET ORAL DAILY
Qty: 30 TABLET | Refills: 2 | Status: SHIPPED | OUTPATIENT
Start: 2025-04-23 | End: 2025-07-22

## 2025-04-23 NOTE — TELEPHONE ENCOUNTER
Last visit:  4/17/2025  Next Visit Date:    Future Appointments   Date Time Provider Department Center   6/27/2025  6:20 AM Lindsey Lucero DO [facility] DE[facility]         Medication List:  Prior to Admission medications    Medication Sig Start Date End Date Taking? Authorizing Provider   predniSONE (DELTASONE) 20 MG tablet Take 2 tablets by mouth daily for 5 doses 4/21/25 4/26/25  Lindsey Lucero DO   amoxicillin-clavulanate (AUGMENTIN) 875-125 MG per tablet Take 1 tablet by mouth 2 times daily for 7 days 4/17/25 4/24/25  René Zarate DNP   HYDROcodone homatropine (HYCODAN) 5-1.5 MG/5ML solution Take 5 mLs by mouth 4 times daily as needed (cough) for up to 7 days. Max Daily Amount: 20 mLs 4/17/25 4/24/25  René Zarate DNP   buPROPion (WELLBUTRIN XL) 150 MG extended release tablet Take 1 tablet by mouth every morning 3/26/25   Lindsey Lucero DO   citalopram (CELEXA) 40 MG tablet Take 1 tablet by mouth daily 3/17/25   Lindsey Lucero DO   gabapentin (NEURONTIN) 300 MG capsule take 1 capsule by mouth every morning then 1 capsule every IN THE AFTERNOON and 2 capsules at bedtime 3/17/25 7/27/25  Lindsey Lucero DO   albuterol sulfate HFA (PROVENTIL;VENTOLIN;PROAIR) 108 (90 Base) MCG/ACT inhaler Inhale 2 puffs into the lungs every 4 hours as needed for Wheezing or Shortness of Breath 2/18/25   Lindsey Lucero DO   modafinil (PROVIGIL) 200 MG tablet Take 1 tablet by mouth daily for 90 days. Max Daily Amount: 200 mg 2/18/25 5/19/25  Lindsey Lucero DO   fluticasone (FLONASE) 50 MCG/ACT nasal spray 2 sprays by Each Nostril route daily 1/6/25   Lindsey Lucero DO   vitamin C (ASCORBIC ACID) 500 MG tablet Take 1 tablet by mouth daily    Provider, MD Nora   zinc sulfate (ZINCATE) 220 (50 Zn)  mg capsule - elemental zinc Take 1 capsule by mouth daily    ProviderNora MD   magnesium oxide (MAG-OX) 400 (240 Mg) MG tablet Take 1 tablet by mouth nightly at

## 2025-05-02 ENCOUNTER — HOSPITAL ENCOUNTER (OUTPATIENT)
Dept: GENERAL RADIOLOGY | Age: 55
End: 2025-05-02
Payer: COMMERCIAL

## 2025-05-02 ENCOUNTER — HOSPITAL ENCOUNTER (OUTPATIENT)
Age: 55
Discharge: HOME | End: 2025-05-02
Payer: COMMERCIAL

## 2025-05-02 ENCOUNTER — OFFICE VISIT (OUTPATIENT)
Dept: FAMILY MEDICINE CLINIC | Age: 55
End: 2025-05-02
Payer: COMMERCIAL

## 2025-05-02 VITALS
WEIGHT: 173 LBS | DIASTOLIC BLOOD PRESSURE: 80 MMHG | HEIGHT: 68 IN | OXYGEN SATURATION: 99 % | BODY MASS INDEX: 26.22 KG/M2 | SYSTOLIC BLOOD PRESSURE: 130 MMHG | HEART RATE: 74 BPM

## 2025-05-02 DIAGNOSIS — K13.79 RECURRENT ORAL ULCERS: Primary | ICD-10-CM

## 2025-05-02 DIAGNOSIS — K27.9 PUD (PEPTIC ULCER DISEASE): ICD-10-CM

## 2025-05-02 DIAGNOSIS — K13.79 RECURRENT ORAL ULCERS: ICD-10-CM

## 2025-05-02 DIAGNOSIS — Z83.2 FAMILY HISTORY OF SARCOIDOSIS: ICD-10-CM

## 2025-05-02 DIAGNOSIS — G93.32 CHRONIC FATIGUE SYNDROME: ICD-10-CM

## 2025-05-02 LAB
25(OH)D3 SERPL-MCNC: 78.1 NG/ML (ref 30–100)
ANION GAP SERPL CALCULATED.3IONS-SCNC: 10 MMOL/L (ref 9–17)
BASOPHILS # BLD: 0.03 K/UL (ref 0–0.2)
BASOPHILS NFR BLD: 0 % (ref 0–2)
BUN SERPL-MCNC: 9 MG/DL (ref 6–20)
CALCIUM SERPL-MCNC: 9.3 MG/DL (ref 8.6–10.4)
CHLORIDE SERPL-SCNC: 103 MMOL/L (ref 98–107)
CO2 SERPL-SCNC: 28 MMOL/L (ref 20–31)
CREAT SERPL-MCNC: 0.9 MG/DL (ref 0.5–0.9)
EOSINOPHIL # BLD: 0.11 K/UL (ref 0–0.4)
EOSINOPHILS RELATIVE PERCENT: 2 % (ref 0–5)
ERYTHROCYTE [DISTWIDTH] IN BLOOD BY AUTOMATED COUNT: 13.3 % (ref 12.1–15.2)
ERYTHROCYTE [SEDIMENTATION RATE] IN BLOOD BY PHOTOMETRIC METHOD: 4 MM/HR (ref 0–30)
FOLATE SERPL-MCNC: 17.5 NG/ML (ref 4.8–24.2)
GFR, ESTIMATED: 77 ML/MIN/1.73M2
GLUCOSE SERPL-MCNC: 88 MG/DL (ref 70–99)
HCT VFR BLD AUTO: 37.4 % (ref 36–46)
HGB BLD-MCNC: 12.4 G/DL (ref 12–16)
IMM GRANULOCYTES # BLD AUTO: 0.01 K/UL (ref 0–0.3)
IMM GRANULOCYTES NFR BLD: 0 % (ref 0–5)
IRON SERPL-MCNC: 54 UG/DL (ref 37–145)
LYMPHOCYTES NFR BLD: 2 K/UL (ref 1–4.8)
LYMPHOCYTES RELATIVE PERCENT: 30 % (ref 15–40)
MCH RBC QN AUTO: 28.6 PG (ref 26–34)
MCHC RBC AUTO-ENTMCNC: 33.2 G/DL (ref 31–37)
MCV RBC AUTO: 86.4 FL (ref 80–100)
MONOCYTES NFR BLD: 0.45 K/UL (ref 0–1)
MONOCYTES NFR BLD: 7 % (ref 4–8)
NEUTROPHILS NFR BLD: 61 % (ref 47–75)
NEUTS SEG NFR BLD: 4.09 K/UL (ref 2.5–7)
PLATELET # BLD AUTO: 305 K/UL (ref 140–450)
PMV BLD AUTO: 10.1 FL (ref 6–12)
POTASSIUM SERPL-SCNC: 4.2 MMOL/L (ref 3.7–5.3)
RBC # BLD AUTO: 4.33 M/UL (ref 4–5.2)
SODIUM SERPL-SCNC: 141 MMOL/L (ref 135–144)
VIT B12 SERPL-MCNC: 410 PG/ML (ref 232–1245)
WBC OTHER # BLD: 6.7 K/UL (ref 3.5–11)

## 2025-05-02 PROCEDURE — G8427 DOCREV CUR MEDS BY ELIG CLIN: HCPCS | Performed by: FAMILY MEDICINE

## 2025-05-02 PROCEDURE — 82746 ASSAY OF FOLIC ACID SERUM: CPT

## 2025-05-02 PROCEDURE — 3075F SYST BP GE 130 - 139MM HG: CPT | Performed by: FAMILY MEDICINE

## 2025-05-02 PROCEDURE — 3017F COLORECTAL CA SCREEN DOC REV: CPT | Performed by: FAMILY MEDICINE

## 2025-05-02 PROCEDURE — 83540 ASSAY OF IRON: CPT

## 2025-05-02 PROCEDURE — 1036F TOBACCO NON-USER: CPT | Performed by: FAMILY MEDICINE

## 2025-05-02 PROCEDURE — 82164 ANGIOTENSIN I ENZYME TEST: CPT

## 2025-05-02 PROCEDURE — 99214 OFFICE O/P EST MOD 30 MIN: CPT | Performed by: FAMILY MEDICINE

## 2025-05-02 PROCEDURE — G8419 CALC BMI OUT NRM PARAM NOF/U: HCPCS | Performed by: FAMILY MEDICINE

## 2025-05-02 PROCEDURE — 86038 ANTINUCLEAR ANTIBODIES: CPT

## 2025-05-02 PROCEDURE — 85025 COMPLETE CBC W/AUTO DIFF WBC: CPT

## 2025-05-02 PROCEDURE — 80048 BASIC METABOLIC PNL TOTAL CA: CPT

## 2025-05-02 PROCEDURE — 82306 VITAMIN D 25 HYDROXY: CPT

## 2025-05-02 PROCEDURE — 86225 DNA ANTIBODY NATIVE: CPT

## 2025-05-02 PROCEDURE — 84630 ASSAY OF ZINC: CPT

## 2025-05-02 PROCEDURE — 36415 COLL VENOUS BLD VENIPUNCTURE: CPT

## 2025-05-02 PROCEDURE — 71046 X-RAY EXAM CHEST 2 VIEWS: CPT

## 2025-05-02 PROCEDURE — 82607 VITAMIN B-12: CPT

## 2025-05-02 PROCEDURE — 3079F DIAST BP 80-89 MM HG: CPT | Performed by: FAMILY MEDICINE

## 2025-05-02 PROCEDURE — 85652 RBC SED RATE AUTOMATED: CPT

## 2025-05-02 RX ORDER — PANTOPRAZOLE SODIUM 20 MG/1
20 TABLET, DELAYED RELEASE ORAL
Qty: 90 TABLET | Refills: 1 | Status: SHIPPED | OUTPATIENT
Start: 2025-05-02

## 2025-05-02 RX ORDER — CITALOPRAM HYDROBROMIDE 40 MG/1
TABLET ORAL
Qty: 90 TABLET | Refills: 1
Start: 2025-05-02

## 2025-05-02 NOTE — PATIENT INSTRUCTIONS
Please try sensodyne toothpaste or another toothpaste that doesn't contain SLS (sodium lauryl sulfate). Continue the  mouthwash you're using. Avoid tomato products, acidic food and drink and potato chips.

## 2025-05-02 NOTE — PROGRESS NOTES
Name: Irene Galvez  : 1970         Chief Complaint:     Chief Complaint   Patient presents with    Mouth Lesions     Multiple sores in her mouth on gums and cheeks, painful. Dentist suggested autoimmune testing. Brother and cousin have sarcoidosis.        History of Present Illness:      Irene Galvez is a 54 y.o.  female who presents with Mouth Lesions (Multiple sores in her mouth on gums and cheeks, painful. Dentist suggested autoimmune testing. Brother and cousin have sarcoidosis. )      HPI    C/o gum lesions off and on for months, currently clearing up. White spots on gums, very sore. Bothered by spicy foods and salsa so avoiding those as well as bananas, pineapple, grapefruit, been this way a long time but comes and goes. Uses colgate but brushing teeth hurts. Soft brush.  Has never had genital ulcers.  Family h/o sarcoidosis in brother and a cousin.    Recent illness with sinus congestion and ear drainage, treated with atb and then prednisone and is starting to feel better.    Some trouble with stomach for a while, cramping, worse after certain foods incl popcorn. Once in a while heartburn and uses mylanta for that. Eating a lot of apples, crackers, mashed potatoes, pudding. H/o PUD yrs ago, recalls she was hospitalized and tx with 2 abx.     Has reduced gabapentin to 300 mg BID and is doing well with that since her lower extremity pains have improved.  Interested in possibly cutting back some meds.  Mood doing fairly well, still a worrier which causes some trouble with her sleep.    Medical History:     Patient Active Problem List   Diagnosis    Anxiety    Insomnia    HTN (hypertension)    Venous insufficiency    Overweight       Medications:       Prior to Admission medications    Medication Sig Start Date End Date Taking? Authorizing Provider   pantoprazole (PROTONIX) 20 MG tablet Take 1 tablet by mouth every morning (before breakfast) 25  Yes Lindsey Lucero, DO   citalopram (CELEXA)

## 2025-05-04 LAB
ACE SERPL-CCNC: 69 U/L (ref 16–85)
ANA SER QL IA: NEGATIVE
DSDNA IGG SER QL IA: <0.5 IU/ML
NUCLEAR IGG SER IA-RTO: 0.2 U/ML
ZINC SERPL-MCNC: 66.8 UG/DL (ref 60–120)

## 2025-05-05 ENCOUNTER — RESULTS FOLLOW-UP (OUTPATIENT)
Dept: FAMILY MEDICINE CLINIC | Age: 55
End: 2025-05-05

## 2025-05-05 NOTE — RESULT ENCOUNTER NOTE
Your labs all look ok, nothing to suggest inflammatory or autoimmune process or nutritional deficiency. Your xray shows signs of possible COPD though you don't have symptoms of that. Nothing suspicious. Dr Lucero recommends the dietary  and toothpaste changes that you had discussed at your visit.

## 2025-05-28 RX ORDER — ALBUTEROL SULFATE 90 UG/1
2 INHALANT RESPIRATORY (INHALATION) EVERY 4 HOURS PRN
Qty: 18 G | Refills: 5 | Status: SHIPPED | OUTPATIENT
Start: 2025-05-28

## 2025-05-28 RX ORDER — GABAPENTIN 300 MG/1
CAPSULE ORAL
Qty: 120 CAPSULE | Refills: 2 | Status: SHIPPED | OUTPATIENT
Start: 2025-05-28 | End: 2025-10-07

## 2025-05-28 NOTE — TELEPHONE ENCOUNTER
Last OV: 5/2/2025  fatigue, oral ulcers    03/26/25 anxiety and depression, insomnia   Last RX:     Next scheduled apt: 6/27/2025   3 months f/u         Pt requesting a refill   Medication pending for approval

## 2025-07-16 ENCOUNTER — OFFICE VISIT (OUTPATIENT)
Dept: FAMILY MEDICINE CLINIC | Age: 55
End: 2025-07-16
Payer: COMMERCIAL

## 2025-07-16 VITALS
HEIGHT: 68 IN | WEIGHT: 178 LBS | DIASTOLIC BLOOD PRESSURE: 80 MMHG | BODY MASS INDEX: 26.98 KG/M2 | HEART RATE: 70 BPM | OXYGEN SATURATION: 98 % | SYSTOLIC BLOOD PRESSURE: 134 MMHG

## 2025-07-16 DIAGNOSIS — F41.9 ANXIETY: ICD-10-CM

## 2025-07-16 DIAGNOSIS — G93.32 CHRONIC FATIGUE SYNDROME: ICD-10-CM

## 2025-07-16 DIAGNOSIS — L57.0 ACTINIC KERATOSIS: Primary | ICD-10-CM

## 2025-07-16 PROCEDURE — 99213 OFFICE O/P EST LOW 20 MIN: CPT | Performed by: FAMILY MEDICINE

## 2025-07-16 PROCEDURE — G8419 CALC BMI OUT NRM PARAM NOF/U: HCPCS | Performed by: FAMILY MEDICINE

## 2025-07-16 PROCEDURE — 3075F SYST BP GE 130 - 139MM HG: CPT | Performed by: FAMILY MEDICINE

## 2025-07-16 PROCEDURE — 3079F DIAST BP 80-89 MM HG: CPT | Performed by: FAMILY MEDICINE

## 2025-07-16 PROCEDURE — 1036F TOBACCO NON-USER: CPT | Performed by: FAMILY MEDICINE

## 2025-07-16 PROCEDURE — 3017F COLORECTAL CA SCREEN DOC REV: CPT | Performed by: FAMILY MEDICINE

## 2025-07-16 PROCEDURE — G8427 DOCREV CUR MEDS BY ELIG CLIN: HCPCS | Performed by: FAMILY MEDICINE

## 2025-07-16 PROCEDURE — 17000 DESTRUCT PREMALG LESION: CPT | Performed by: FAMILY MEDICINE

## 2025-07-16 RX ORDER — ALPRAZOLAM 0.5 MG
0.5 TABLET ORAL 3 TIMES DAILY PRN
Qty: 60 TABLET | Refills: 0 | Status: SHIPPED | OUTPATIENT
Start: 2025-07-16 | End: 2025-08-15

## 2025-07-16 RX ORDER — DEXTROAMPHETAMINE SACCHARATE, AMPHETAMINE ASPARTATE MONOHYDRATE, DEXTROAMPHETAMINE SULFATE AND AMPHETAMINE SULFATE 3.75; 3.75; 3.75; 3.75 MG/1; MG/1; MG/1; MG/1
1 CAPSULE, EXTENDED RELEASE ORAL DAILY
Qty: 30 CAPSULE | Refills: 0 | Status: SHIPPED | OUTPATIENT
Start: 2025-07-16 | End: 2025-08-15

## 2025-07-16 NOTE — PATIENT INSTRUCTIONS
[employer] SURVEY:    You may be receiving a survey from [employer] regarding your visit today.    You may get this in the mail, through your MyChart or in your email.     Please complete the survey to enable us to provide the highest quality of care to you and your family.      Thank you.    Clinical Care Team:   Dr. Lindsey Lucero, DO Kash Lord CMA                                     Triage: Zamzam Nguyen CMA              Clerical Team:    Zamzam Westfall     [place] [phone]           Billing questions: [phone]           Medical Records Request: [phone]

## 2025-07-16 NOTE — PROGRESS NOTES
Name: Irene Galvez  : 1970         Chief Complaint:     Chief Complaint   Patient presents with    Fatigue    Mole       History of Present Illness:      Irene Galvez is a 54 y.o.  female who presents with Fatigue and Mole      HPI    C/o ongoing fatigue, tired all the time. Works 4 nights a week, gets off at 5AM, goes to bed right away and then gets up at 9:30-10. Gets up and has coffee, does some housework, usually lays back down but doesn't go back to sleep. If not working she'll go to bed between 10-11pm and sleeps better, longer, sleeps til 5-6 but up at 3 briefly. Modafinil doesn't help as much as adderall did. We had switched d/t availability.     C/o elevated systolic bp at times, highest 144.    Stomach feeling a lot better since starting protonix.     L cheek lesion burns sometimes when out in sun despite use of sunscreen. Lesion R flank raised but not causing any irritation.    Medical History:     Patient Active Problem List   Diagnosis    Anxiety    Insomnia    HTN (hypertension)    Venous insufficiency    Overweight       Medications:       Prior to Admission medications    Medication Sig Start Date End Date Taking? Authorizing Provider   ALPRAZolam (XANAX) 0.5 MG tablet Take 1 tablet by mouth 3 times daily as needed for Anxiety for up to 30 days. 7/16/25 8/15/25 Yes Lindsey Lucero DO   amphetamine-dextroamphetamine (ADDERALL XR) 15 MG extended release capsule Take 1 capsule by mouth daily for 30 days. Max Daily Amount: 1 capsule 7/16/25 8/15/25 Yes Lindsey Lucero DO   albuterol sulfate HFA (PROVENTIL;VENTOLIN;PROAIR) 108 (90 Base) MCG/ACT inhaler Inhale 2 puffs into the lungs every 4 hours as needed for Wheezing or Shortness of Breath 25  Yes Lindsey Lucero DO   gabapentin (NEURONTIN) 300 MG capsule take 1 capsule by mouth every morning then 1 capsule every IN THE AFTERNOON and 2 capsules at bedtime 5/28/25 10/7/25 Yes Lindsey Lucero DO   pantoprazole (PROTONIX) 20

## 2025-07-22 ENCOUNTER — PATIENT MESSAGE (OUTPATIENT)
Dept: FAMILY MEDICINE CLINIC | Age: 55
End: 2025-07-22

## 2025-07-24 NOTE — PROGRESS NOTES
Approved  Irene Galvez  : 1970  Drug  Amphetamine-Dextroamphet ER 15MG er capsules  NPI  [id number]  Prescriber  Lindsey Lucero  Request ID  [id number]

## 2025-08-29 DIAGNOSIS — G93.32 CHRONIC FATIGUE SYNDROME: ICD-10-CM

## 2025-08-29 RX ORDER — LANOLIN ALCOHOL/MO/W.PET/CERES
400 CREAM (GRAM) TOPICAL NIGHTLY
Qty: 90 TABLET | Refills: 3 | Status: SHIPPED | OUTPATIENT
Start: 2025-08-29

## 2025-08-29 RX ORDER — BUPROPION HYDROCHLORIDE 150 MG/1
150 TABLET ORAL EVERY MORNING
Qty: 90 TABLET | Refills: 1 | Status: SHIPPED | OUTPATIENT
Start: 2025-08-29

## 2025-08-29 RX ORDER — DEXTROAMPHETAMINE SACCHARATE, AMPHETAMINE ASPARTATE MONOHYDRATE, DEXTROAMPHETAMINE SULFATE AND AMPHETAMINE SULFATE 3.75; 3.75; 3.75; 3.75 MG/1; MG/1; MG/1; MG/1
1 CAPSULE, EXTENDED RELEASE ORAL DAILY
Qty: 30 CAPSULE | Refills: 0 | Status: SHIPPED | OUTPATIENT
Start: 2025-08-29 | End: 2025-09-28

## (undated) DEVICE — GAUZE SPONGES,8 PLY: Brand: CURITY

## (undated) DEVICE — DISCONTINUED USE 393278 SYRINGE 10 ML HYPO W/O NDL LL TP PLSTC ST

## (undated) DEVICE — DISPOSABLE LAPAROSCOPIC CLIP APPLIER WITH 20 CLIPS: Brand: DIRECT DRIVE® CLIP APPLIER

## (undated) DEVICE — COVER,MAYO STAND,STERILE: Brand: MEDLINE

## (undated) DEVICE — GLOVE ORANGE PI 7   MSG9070

## (undated) DEVICE — STAPLER SKIN H3.9MM WIRE DIA0.58MM CRWN 6.9MM 35 STPL ROT

## (undated) DEVICE — CANNULA NSL AD TUBE L7FT END TDL CO2 SAMP STD CONN DISP

## (undated) DEVICE — DRAPE,UTILTY,TAPE,15X26, 4EA/PK: Brand: MEDLINE

## (undated) DEVICE — APPLICATOR MEDICATED 10.5 CC SOLUTION HI LT ORNG CHLORAPREP

## (undated) DEVICE — TROCAR: Brand: KII® SLEEVE

## (undated) DEVICE — INTENDED FOR TISSUE SEPARATION, AND OTHER PROCEDURES THAT REQUIRE A SHARP SURGICAL BLADE TO PUNCTURE OR CUT.: Brand: BARD-PARKER ® CARBON RIB-BACK BLADES

## (undated) DEVICE — Device

## (undated) DEVICE — SOLUTION: ACTIFOG W/FOAM PAD 48/CS: Brand: ACTIFOG™

## (undated) DEVICE — NEEDLE BRR E2230T 22GA TUOHY

## (undated) DEVICE — TROCAR ENDOSCP L100MM DIA11MM BLDELSS STBL SL W HNDL

## (undated) DEVICE — NDLCTR: FOAM/MAG 40CT 64/CS: Brand: MEDICAL ACTION INDUSTRIES

## (undated) DEVICE — SOLUTION IRRIG 1500ML STRL H2O USP POUR PLAS BTL

## (undated) DEVICE — TOWEL SURG W16XL26IN WHT NONFENESTRATED ST 4 PER PK

## (undated) DEVICE — NERVE BLOCK TRAY: Brand: MEDLINE INDUSTRIES, INC.

## (undated) DEVICE — DISSECTOR LAP DIA5MM BLNT TIP ENDOPATH

## (undated) DEVICE — STANDARD HYPODERMIC NEEDLE,POLYPROPYLENE HUB: Brand: MONOJECT

## (undated) DEVICE — GLOVE ORANGE PI 7 1/2   MSG9075

## (undated) DEVICE — APPLICATOR PREP 26ML 0.7% IOD POVACRYLEX 74% ISO ALC ST

## (undated) DEVICE — TABLE COVER: Brand: CONVERTORS

## (undated) DEVICE — BANDAGE ADH W0.75XL3IN NAT NEON PLAS CURAD

## (undated) DEVICE — SYRINGE 7CC LL PLAS LOR10/CS

## (undated) DEVICE — INSUFFLATION NEEDLE TO ESTABLISH PNEUMOPERITONEUM.: Brand: INSUFFLATION NEEDLE

## (undated) DEVICE — 3M™ TEGADERM™ +PAD FILM DRESSING WITH NON-ADHERENT PAD, 3591, 3-1/2 IN X 10 IN (9 CM X 25 CM), 25/CAR, 4 CAR/CS: Brand: 3M™ TEGADERM™

## (undated) DEVICE — GAUZE,SPONGE,4"X4",16PLY,XRAY,STRL,LF: Brand: MEDLINE

## (undated) DEVICE — SKIN MARKER,REGULAR TIP WITH RULER: Brand: DEVON

## (undated) DEVICE — TISSUE RETRIEVAL SYSTEM: Brand: INZII RETRIEVAL SYSTEM

## (undated) DEVICE — LABEL MED L2 1/4IN H7/8IN RED ST UNLESS PKG OPN PERM ADH

## (undated) DEVICE — SUTURE SZ 0 27IN 5/8 CIR UR-6  TAPER PT VIOLET ABSRB VICRYL J603H

## (undated) DEVICE — DRAPE,LAP,CHOLE,W/TROUGHS,STERILE: Brand: MEDLINE

## (undated) DEVICE — SCOPE WARMER THAT PRE-WARMS MULTIPLE LAPAROSCOPES.: Brand: JOSNOE MEDICAL INC

## (undated) DEVICE — TROCAR: Brand: KII FIOS FIRST ENTRY

## (undated) DEVICE — NEEDLE HYPO 18GA L1IN PNK POLYPR HUB S STL REG BVL STR W/O

## (undated) DEVICE — 3M™ TEGADERM™ +PAD FILM DRESSING WITH NON-ADHERENT PAD, 3587, 3-1/2 IN X 4-1/8 IN (9 CM X 10.5 CM), 25/CAR, 4 CAR/CS: Brand: 3M™ TEGADERM™

## (undated) DEVICE — ELECTRODE PT RET AD L9FT HI MOIST COND ADH HYDRGEL CORDED

## (undated) DEVICE — GOWN,AURORA,NONRNF,XL,30/CS: Brand: MEDLINE

## (undated) DEVICE — 20GX4.5 TUOHY EPIDURAL NEEDLE: Brand: MEDLINE

## (undated) DEVICE — NEEDLE SPNL 22GAX1 1/2IN